# Patient Record
Sex: FEMALE | Race: WHITE | NOT HISPANIC OR LATINO | Employment: OTHER | URBAN - METROPOLITAN AREA
[De-identification: names, ages, dates, MRNs, and addresses within clinical notes are randomized per-mention and may not be internally consistent; named-entity substitution may affect disease eponyms.]

---

## 2017-01-10 RX ORDER — ATORVASTATIN CALCIUM 40 MG/1
TABLET, FILM COATED ORAL
Qty: 90 TAB | Refills: 1 | Status: SHIPPED | OUTPATIENT
Start: 2017-01-10 | End: 2017-05-10 | Stop reason: SDUPTHER

## 2017-05-05 ENCOUNTER — PATIENT MESSAGE (OUTPATIENT)
Dept: MEDICAL GROUP | Facility: MEDICAL CENTER | Age: 62
End: 2017-05-05

## 2017-05-05 DIAGNOSIS — Z00.00 ANNUAL PHYSICAL EXAM: ICD-10-CM

## 2017-05-08 NOTE — TELEPHONE ENCOUNTER
From: Ren Shrestha  To: Asa Gordon M.D.  Sent: 2017 5:10 PM PDT  Subject: Non-Urgent Medical Question    RE: bloodwork    If I get the papers and bloodwork done monday will you have results by Wednesday morning? Ren pickett 51  ----- Message -----  From: Asa Gordon M.D.  Sent: 2017 5:04 PM PDT  To: Ren Shrestha  Subject: RE: Non-Urgent Medical Question    Ren,    I have ordered fasting blood work, but you will need to  the orders up at our . Unfortunately, we are not open on the weekends, we do opened around 7:30 in the morning on Monday.    Dr. Gordon      ----- Message -----   From: REN SHRESTHA   Sent: 2017 4:55 PM PDT   To: Asa Gordon M.D.  Subject: Non-Urgent Medical Question    Hi Dr. Gordon;  I have an appt. with you May 10, Wednesday morning.  I was hoping to get blood work done before my visit. I need to have my Cholesterol  levels checked and the Neurologist you sent me to suggested i have my serum creatine checked  again as well. Let me know if theres enough time, i could get it done at [facility] saturday, tomorrow  morning. Ren pickett 51

## 2017-05-10 ENCOUNTER — OFFICE VISIT (OUTPATIENT)
Dept: MEDICAL GROUP | Facility: MEDICAL CENTER | Age: 62
End: 2017-05-10
Payer: COMMERCIAL

## 2017-05-10 VITALS
DIASTOLIC BLOOD PRESSURE: 82 MMHG | HEART RATE: 76 BPM | WEIGHT: 174 LBS | SYSTOLIC BLOOD PRESSURE: 124 MMHG | OXYGEN SATURATION: 94 % | TEMPERATURE: 98.4 F | RESPIRATION RATE: 16 BRPM | HEIGHT: 65 IN | BODY MASS INDEX: 28.99 KG/M2

## 2017-05-10 DIAGNOSIS — E55.9 VITAMIN D INSUFFICIENCY: ICD-10-CM

## 2017-05-10 DIAGNOSIS — M25.511 CHRONIC RIGHT SHOULDER PAIN: ICD-10-CM

## 2017-05-10 DIAGNOSIS — G89.29 CHRONIC RIGHT SHOULDER PAIN: ICD-10-CM

## 2017-05-10 DIAGNOSIS — R74.8 ELEVATED CPK: ICD-10-CM

## 2017-05-10 DIAGNOSIS — M67.471 DIGITAL MUCOUS CYST OF TOE OF RIGHT FOOT: ICD-10-CM

## 2017-05-10 DIAGNOSIS — E78.00 HYPERCHOLESTEREMIA: ICD-10-CM

## 2017-05-10 PROBLEM — M67.40 BENIGN CYSTIC MUCINOUS TUMOR: Status: ACTIVE | Noted: 2017-05-10

## 2017-05-10 PROCEDURE — 99214 OFFICE O/P EST MOD 30 MIN: CPT | Performed by: FAMILY MEDICINE

## 2017-05-10 RX ORDER — ATORVASTATIN CALCIUM 80 MG/1
80 TABLET, FILM COATED ORAL
Qty: 90 TAB | Refills: 3 | Status: SHIPPED | OUTPATIENT
Start: 2017-05-10 | End: 2018-03-13 | Stop reason: SDUPTHER

## 2017-05-10 ASSESSMENT — PATIENT HEALTH QUESTIONNAIRE - PHQ9: CLINICAL INTERPRETATION OF PHQ2 SCORE: 0

## 2017-05-10 NOTE — PROGRESS NOTES
Subjective:   Joanie Arenas is a 62 y.o. female here today for elevated CPK, hyperlipidemia, right shoulder pain, right foot pain    Vitamin D insufficiency  Taking vitamin D 2000 units daily.    Digital mucous cyst of toe of right foot  Patient has a medial right first metatarsal joint cyst that all of a sudden has increased in size. She states it is sometimes difficult for her to wear shoes, especially if the toe box is not very wide.    Chronic right shoulder pain  For the last 6 months patient has had right shoulder pain. She states that shoulder pain has slowly improved. She went to physical therapy after initial pain came on, she states physical therapy was not very helpful for improving her pain. She does exercises at the gym for her shoulders.  Patient has difficulty unplugging her blow dryer and brushing her hair.    Elevated CPK  Patient suffers from regular muscle cramps. She has reduced her alcohol intake from a couple beers per week 20 beers per week. She has lost 20 pounds as a result.  Her CPK count is improved.    Hypercholesteremia  Patient has significant family history of early coronary artery disease. Her father  in his 30s. Patient is on atorvastatin 40 mg daily.         Current medicines (including changes today)  Current Outpatient Prescriptions   Medication Sig Dispense Refill   • atorvastatin (LIPITOR) 80 MG tablet Take 1 Tab by mouth every day. 90 Tab 3   • meloxicam (MOBIC) 15 MG tablet 1 tab PO daily x 14 days then 1 tab PO daily PRN 30 Tab 0   • lisinopril (PRINIVIL) 20 MG Tab TAKE ONE TABLET BY MOUTH ONE TIME DAILY 90 Tab 3   • nitrofurantoin (MACRODANTIN) 50 MG Cap Take 50 mg by mouth 4 times a day.     • fluticasone (FLONASE) 50 MCG/ACT nasal spray Spray 1 Spray in nose 2 times a day. 1 Bottle 0   • vitamin B comp+C (ALLBEE WITH C) Tab Take 1 Tab by mouth every day.     • ascorbic acid (ASCORBIC ACID) 500 MG Tab Take 500 mg by mouth every day.     • Cranberry 125 MG Tab  "Take  by mouth.     • aspirin (ASA) 81 MG Chew Tab chewable tablet Take 81 mg by mouth every day.     • vitamin D (CHOLECALCIFEROL) 1000 UNIT Tab Take 1,000 Units by mouth every day.     • Omega-3 Fatty Acids (SALMON OIL-1000 PO) Take  by mouth.     • Probiotic Product (PROBIOTIC DAILY PO) Take  by mouth.     • Glucosamine-Chondroit-Vit C-Mn (GLUCOSAMINE 1500 COMPLEX PO) Take  by mouth.     • CHONDROITIN SULFATE PO Take  by mouth.     • ibuprofen (MOTRIN) 200 MG Tab Take 200 mg by mouth every 6 hours as needed.     • Magnesium 300 MG Cap Take  by mouth.     • estradiol (ESTRACE) 0.1 MG/GM vaginal cream Use vaginally daily for one week, then twice a week 1 Tube 5     No current facility-administered medications for this visit.     She  has a past medical history of Hypercholesteremia; Arthritis (2014); Benign hypertension (2014); Cold (2014); and Pain (2014).    ROS   No chest pain, no shortness of breath       Objective:     Blood pressure 124/82, pulse 76, temperature 36.9 °C (98.4 °F), resp. rate 16, height 1.651 m (5' 5\"), weight 78.926 kg (174 lb), SpO2 94 %. Body mass index is 28.96 kg/(m^2).   Physical Exam:  Constitutional: Alert, no distress.  Skin: Warm, dry, good turgor, no rashes in visible areas.  Eye: Equal, round and reactive, conjunctiva clear, lids normal.  Psych: Alert and oriented x3, normal affect and mood.  Right foot: Soft, mobile, nodule of right medial first metatarsal joint.    Results for REN SHRESTHA (MRN [id number]) as of 5/10/2017 09:02   Ref. Range 5/8/2017 08:27   WBC Latest Ref Range: 3.4-10.8 x10E3/uL 5.2   RBC Latest Ref Range: 3.77-5.28 x10E6/uL 4.83   Hemoglobin Latest Ref Range: 11.1-15.9 g/dL 14.1   Hematocrit Latest Ref Range: 34.0-46.6 % 44.0   MCV Latest Ref Range: 79-97 fL 91   MCH Latest Ref Range: 26.6-33.0 pg 29.2   MCHC Latest Ref Range: 31.5-35.7 g/dL 32.0   RDW Latest Ref Range: 12.3-15.4 % 14.1   Platelet Count Latest Ref Range: 150-379 x10E3/uL 256   Immature " Cells Unknown CANCELED   Neutrophils-Polys Latest Units: % 57   Neutrophils (Absolute) Latest Ref Range: 1.4-7.0 x10E3/uL 3.0   Lymphocytes Latest Units: % 34   Lymphs (Absolute) Latest Ref Range: 0.7-3.1 x10E3/uL 1.8   Monocytes Latest Units: % 7   Monos (Absolute) Latest Ref Range: 0.1-0.9 x10E3/uL 0.4   Eosinophils Latest Units: % 1   Eos (Absolute) Latest Ref Range: 0.0-0.4 x10E3/uL 0.1   Basophils Latest Units: % 1   Baso (Absolute) Latest Ref Range: 0.0-0.2 x10E3/uL 0.0   Immature Granulocytes Latest Units: % 0   Immature Granulocytes (abs) Latest Ref Range: 0.0-0.1 x10E3/uL 0.0   Nucleated RBC Unknown CANCELED   Comments-Diff Unknown CANCELED   Sodium Latest Ref Range: 134-144 mmol/L 144   Potassium Latest Ref Range: 3.5-5.2 mmol/L 4.0   Chloride Latest Ref Range:  mmol/L 106   Co2 Latest Ref Range: 18-29 mmol/L 22   Glucose Latest Ref Range: 65-99 mg/dL 96   Bun Latest Ref Range: 8-27 mg/dL 14   Creatinine Latest Ref Range: 0.57-1.00 mg/dL 0.66   GFR If  Latest Ref Range: >59 mL/min/1.73 108   GFR If Non  Latest Ref Range: >59 mL/min/1.73 94   Bun-Creatinine Ratio Latest Ref Range: 12-28  21   Calcium Latest Ref Range: 8.7-10.3 mg/dL 8.9   AST(SGOT) Latest Ref Range: 0-40 IU/L 23   ALT(SGPT) Latest Ref Range: 0-32 IU/L 20   Alkaline Phosphatase Latest Ref Range:  IU/L 72   Total Bilirubin Latest Ref Range: 0.0-1.2 mg/dL 0.5   Albumin Latest Ref Range: 3.6-4.8 g/dL 4.4   Total Protein Latest Ref Range: 6.0-8.5 g/dL 6.4   Globulin Latest Ref Range: 1.5-4.5 g/dL 2.0   A-G Ratio Latest Ref Range: 1.2-2.2  2.2   CPK Total Latest Ref Range:  U/L 274 (H)   Cholesterol,Tot Latest Ref Range: 100-199 mg/dL 231 (H)   Triglycerides Latest Ref Range: 0-149 mg/dL 135   HDL Latest Ref Range: >39 mg/dL 41   LDL Latest Ref Range: 0-99 mg/dL 163 (H)   VLDL Cholesterol Calc Latest Ref Range: 5-40 mg/dL 27   Comment: Unknown CANCELED   CK Macro Type 2 Latest Ref Range: Not  Observed % 0   CPK3 Mm Latest Ref Range:  % 92 (L)   CPK2 Mb Latest Ref Range: 0-3 % 3   CK Macro Type 1 Latest Ref Range: Not Observed % 5 (H)   CPK1 Bb Latest Ref Range: 0 % 0   25-Hydroxy   Vitamin D 25 Latest Ref Range: 30.0-100.0 ng/mL 32.3   TSH Latest Ref Range: 0.450-4.500 uIU/mL 2.120   Free T-4 Latest Ref Range: 0.82-1.77 ng/dL 1.10         Assessment and Plan:   The following treatment plan was discussed    1. Hypercholesteremia  Uncontrolled. Increase atorvastatin from 40 mg daily to 20 mg daily. Follow up with labs in 6 months. Monitor cramping.  - COMP METABOLIC PANEL; Future  - LIPID PROFILE; Future  - atorvastatin (LIPITOR) 80 MG tablet; Take 1 Tab by mouth every day.  Dispense: 90 Tab; Refill: 3    2. Elevated CPK  Most likely from muscle cramping. Advised hydration, which she is doing. Advise regular stretching, which she is doing. Consider adding magnesium 400 mg daily.  - CK ISOENZYMES SERUM; Future    3. Vitamin D insufficiency  Controlled. Continue vitamin D supplementation.    4. Chronic right shoulder pain  Reassurance given. Advised patient on allowing more time and continuing with exercises.    5. Digital mucous cyst of toe of right foot  Advised patient to notify us if cyst becomes symptomatic and we will refer her to foot specialist.      Followup: Return in about 6 months (around 11/10/2017) for Labs, Short.

## 2017-05-10 NOTE — ASSESSMENT & PLAN NOTE
For the last 6 months patient has had right shoulder pain. She states that shoulder pain has slowly improved. She went to physical therapy after initial pain came on, she states physical therapy was not very helpful for improving her pain. She does exercises at the gym for her shoulders.  Patient has difficulty unplugging her blow dryer and brushing her hair.

## 2017-05-10 NOTE — MR AVS SNAPSHOT
"        Joanie Germain Dagoberto   5/10/2017 8:40 AM   Office Visit   MRN: [id number]    Department:  [facility]   Dept Phone:  [phone]    Description:  Female : 1955   Provider:  Asa Gordon M.D.           Reason for Visit     Follow-Up     Results           Allergies as of 5/10/2017     No Known Allergies      You were diagnosed with     Hypercholesteremia   [299067]       Elevated CPK   [284369]       Vitamin D insufficiency   [560892]       Chronic right shoulder pain   [793207]       Digital mucous cyst of toe of right foot   [4092660]         Vital Signs     Blood Pressure Pulse Temperature Respirations Height Weight    124/82 mmHg 76 36.9 °C (98.4 °F) 16 1.651 m (5' 5\") 78.926 kg (174 lb)    Body Mass Index Oxygen Saturation Smoking Status             28.96 kg/m2 94% Never Smoker          Basic Information     Date Of Birth Sex Race Ethnicity Preferred Language    1955 Female White Non- English      Problem List              ICD-10-CM Priority Class Noted - Resolved    Essential hypertension, benign I10   2012 - Present    Hypercholesteremia E78.00   2012 - Present    Other chest pain R07.89   2012 - Present    Undiagnosed cardiac murmurs R01.1   2012 - Present    Abnormal stress ECG with treadmill R94.39   2012 - Present    Family history of early CAD Z82.49   2013 - Present    Chronic lower back pain M54.5, G89.29   2013 - Present    Vitamin D insufficiency E55.9   2013 - Present    Spinal stenosis, lumbar region, without neurogenic claudication M48.06   2014 - Present    Left foot pain M79.672   2014 - Present    Muscle cramps R25.2   2015 - Present    Primary osteoarthritis of both knees M17.0   2015 - Present    Pain with urination R30.9   2015 - Present    Bilateral hearing loss H91.93   2015 - Present    Upper respiratory infection J06.9   2015 - Present    Elevated CPK R74.8   2016 - " Present    Chronic right shoulder pain M25.511, G89.29   5/10/2017 - Present    Digital mucous cyst of toe of right foot M67.471   5/10/2017 - Present      Health Maintenance        Date Due Completion Dates    PAP SMEAR 1/28/1974 ---    MAMMOGRAM 11/7/2017 11/7/2016, 12/30/2014, 11/6/2013, 10/19/2012, 6/9/2011, 12/28/2009, 12/28/2009, 4/30/2008, 4/30/2008    COLONOSCOPY 12/9/2023 12/9/2013    IMM DTaP/Tdap/Td Vaccine (2 - Td) 7/1/2025 7/1/2015            Current Immunizations     Influenza TIV (IM) 9/23/2014, 10/1/2013    Influenza Vaccine Quad Inj (Pf) 10/25/2015    Pneumococcal polysaccharide vaccine (PPSV-23) 11/26/2002    SHINGLES VACCINE 9/24/2013    Tdap Vaccine 7/1/2015    Tetanus Vaccine 5/11/2001      Below and/or attached are the medications your provider expects you to take. Review all of your home medications and newly ordered medications with your provider and/or pharmacist. Follow medication instructions as directed by your provider and/or pharmacist. Please keep your medication list with you and share with your provider. Update the information when medications are discontinued, doses are changed, or new medications (including over-the-counter products) are added; and carry medication information at all times in the event of emergency situations     Allergies:  No Known Allergies          Medications  Valid as of: May 10, 2017 -  9:28 AM    Generic Name Brand Name Tablet Size Instructions for use    Ascorbic Acid (Tab) ascorbic acid 500 MG Take 500 mg by mouth every day.        Aspirin (Chew Tab) ASA 81 MG Take 81 mg by mouth every day.        Atorvastatin Calcium (Tab) LIPITOR 80 MG Take 1 Tab by mouth every day.        B Complex-C (Tab) ALLBEE WITH C  Take 1 Tab by mouth every day.        Cholecalciferol (Tab) cholecalciferol 1000 UNIT Take 1,000 Units by mouth every day.        Chondroitin Sulfate   Take  by mouth.        Cranberry (Tab) Cranberry 125 MG Take  by mouth.        Estradiol (Cream)  ESTRACE 0.1 MG/GM Use vaginally daily for one week, then twice a week        Fluticasone Propionate (Suspension) FLONASE 50 MCG/ACT Spray 1 Spray in nose 2 times a day.        Glucosamine-Chondroit-Vit C-Mn   Take  by mouth.        Ibuprofen (Tab) MOTRIN 200 MG Take 200 mg by mouth every 6 hours as needed.        Lisinopril (Tab) PRINIVIL 20 MG TAKE ONE TABLET BY MOUTH ONE TIME DAILY        Magnesium (Cap) Magnesium 300 MG Take  by mouth.        Meloxicam (Tab) MOBIC 15 MG 1 tab PO daily x 14 days then 1 tab PO daily PRN        Nitrofurantoin Macrocrystal (Cap) MACRODANTIN 50 MG Take 50 mg by mouth 4 times a day.        Omega-3 Fatty Acids   Take  by mouth.        Probiotic Product   Take  by mouth.        .                 Medicines prescribed today were sent to:     [address] [address]    Phone: [phone] Fax: [phone]    Open 24 Hours?: No      Medication refill instructions:       If your prescription bottle indicates you have medication refills left, it is not necessary to call your provider’s office. Please contact your pharmacy and they will refill your medication.    If your prescription bottle indicates you do not have any refills left, you may request refills at any time through one of the following ways: The online [facility] system (except Urgent Care), by calling your provider’s office, or by asking your pharmacy to contact your provider’s office with a refill request. Medication refills are processed only during regular business hours and may not be available until the next business day. Your provider may request additional information or to have a follow-up visit with you prior to refilling your medication.   *Please Note: Medication refills are assigned a new Rx number when refilled electronically. Your pharmacy may indicate that no refills were authorized even though a new prescription for the same medication is  available at the pharmacy. Please request the medicine by name with the pharmacy before contacting your provider for a refill.        Your To Do List     Future Labs/Procedures Complete By Expires    CK ISOENZYMES SERUM  As directed 5/11/2018    COMP METABOLIC PANEL  As directed 5/11/2018    LIPID PROFILE  As directed 5/11/2018         MyChart Access Code: Activation code not generated  Current [facility]renschmiede Status: Active

## 2017-05-10 NOTE — ASSESSMENT & PLAN NOTE
Patient has a medial right first metatarsal joint cyst that all of a sudden has increased in size. She states it is sometimes difficult for her to wear shoes, especially if the toe box is not very wide.

## 2017-05-10 NOTE — ASSESSMENT & PLAN NOTE
Patient suffers from regular muscle cramps. She has reduced her alcohol intake from a couple beers per week 20 beers per week. She has lost 20 pounds as a result.  Her CPK count is improved.

## 2017-05-10 NOTE — ASSESSMENT & PLAN NOTE
Patient has significant family history of early coronary artery disease. Her father  in his 30s. Patient is on atorvastatin 40 mg daily.

## 2017-05-31 ENCOUNTER — PATIENT MESSAGE (OUTPATIENT)
Dept: MEDICAL GROUP | Facility: MEDICAL CENTER | Age: 62
End: 2017-05-31

## 2017-05-31 DIAGNOSIS — E78.00 HYPERCHOLESTEREMIA: ICD-10-CM

## 2017-05-31 DIAGNOSIS — Z82.49 FAMILY HISTORY OF EARLY CAD: ICD-10-CM

## 2017-05-31 NOTE — TELEPHONE ENCOUNTER
"From: Joanie Arenas  To: Asa Gordon M.D.  Sent: 5/31/2017 4:00 PM PDT  Subject: Non-Urgent Medical Question    Hi Dr. Gordon; this is Dagoberto Nair birthdate [birth year]. I was just in to see you a few weeks ago.  My daughter who is 39 has hi- cholesterol (it is family history related) she went to see her cardiologist, Dr. Quinones,  He told her that when she is 48 she should have a \"Coronary calcification Study\" to measure the risk of heart disease.  He also told her that I, her mom should have one done asap. would you recommend one for me??    Joanie Arenas  "

## 2017-06-08 ENCOUNTER — PATIENT MESSAGE (OUTPATIENT)
Dept: MEDICAL GROUP | Facility: MEDICAL CENTER | Age: 62
End: 2017-06-08

## 2017-06-08 ENCOUNTER — HOSPITAL ENCOUNTER (OUTPATIENT)
Dept: RADIOLOGY | Facility: MEDICAL CENTER | Age: 62
End: 2017-06-08
Attending: FAMILY MEDICINE
Payer: COMMERCIAL

## 2017-06-08 DIAGNOSIS — E78.00 HYPERCHOLESTEREMIA: ICD-10-CM

## 2017-06-08 DIAGNOSIS — Z82.49 FAMILY HISTORY OF EARLY CAD: ICD-10-CM

## 2017-06-08 PROCEDURE — 4410556 CT-CARDIAC SCORING

## 2017-06-09 NOTE — TELEPHONE ENCOUNTER
"From: Joanie Arenas  To: Asa Gordon M.D.  Sent: 2017 10:40 PM PDT  Subject: Non-Urgent Medical Question    This is from Joanie Arenas  51 I had the the Coronary Calcification Test and got your comments. You suggested  taking \"Crestor\". I'm already taking Atorvastation 80 mg. Aren't they the same??    Joanie Arenas  "

## 2017-06-28 RX ORDER — LISINOPRIL 20 MG/1
TABLET ORAL
Qty: 90 TAB | Refills: 3 | Status: SHIPPED | OUTPATIENT
Start: 2017-06-28 | End: 2018-06-08 | Stop reason: SDUPTHER

## 2017-11-14 ENCOUNTER — TELEPHONE (OUTPATIENT)
Dept: MEDICAL GROUP | Facility: MEDICAL CENTER | Age: 62
End: 2017-11-14

## 2017-11-14 NOTE — TELEPHONE ENCOUNTER
----- Message from Asa Gordon M.D. sent at 11/14/2017  7:17 AM PST -----  Please notify patient that her CPK, blood test checking her muscle breakdown, continues to improve. We should recheck it in 6 months before her annual appointment.  Asa Gordon M.D.

## 2017-12-15 ENCOUNTER — HOSPITAL ENCOUNTER (OUTPATIENT)
Dept: RADIOLOGY | Facility: MEDICAL CENTER | Age: 62
End: 2017-12-15
Attending: FAMILY MEDICINE
Payer: COMMERCIAL

## 2017-12-15 DIAGNOSIS — Z12.31 VISIT FOR SCREENING MAMMOGRAM: ICD-10-CM

## 2017-12-15 PROCEDURE — G0202 SCR MAMMO BI INCL CAD: HCPCS

## 2018-03-13 DIAGNOSIS — E78.00 HYPERCHOLESTEREMIA: ICD-10-CM

## 2018-03-13 RX ORDER — ATORVASTATIN CALCIUM 80 MG/1
80 TABLET, FILM COATED ORAL
Qty: 90 TAB | Refills: 3 | Status: SHIPPED | OUTPATIENT
Start: 2018-03-13 | End: 2018-12-11 | Stop reason: SDUPTHER

## 2018-06-08 ENCOUNTER — PATIENT MESSAGE (OUTPATIENT)
Dept: MEDICAL GROUP | Facility: MEDICAL CENTER | Age: 63
End: 2018-06-08

## 2018-06-08 DIAGNOSIS — E78.00 HYPERCHOLESTEREMIA: ICD-10-CM

## 2018-06-08 DIAGNOSIS — R74.8 ELEVATED CPK: ICD-10-CM

## 2018-06-08 DIAGNOSIS — I10 ESSENTIAL HYPERTENSION, BENIGN: ICD-10-CM

## 2018-06-08 RX ORDER — LISINOPRIL 20 MG/1
TABLET ORAL
Qty: 90 TAB | Refills: 0 | Status: SHIPPED | OUTPATIENT
Start: 2018-06-08 | End: 2018-07-06 | Stop reason: SDUPTHER

## 2018-06-08 NOTE — TELEPHONE ENCOUNTER
Refill done. Patient is due for annual appointment. Please have patient schedule.  Asa Gordon M.D.

## 2018-06-08 NOTE — LETTER
June 8, 2018        Joanie Arenas  [address] [facility] [place] [address]        Dear Joanie:    This letter is to inform you the you are due for an annual appointment. Please contact our scheduling department at [phone] To schedule       If you have any questions or concerns, please don't hesitate to call.        Sincerely,        Asa Gordon M.D.    Electronically Signed

## 2018-06-20 ENCOUNTER — HOSPITAL ENCOUNTER (OUTPATIENT)
Dept: LAB | Facility: MEDICAL CENTER | Age: 63
End: 2018-06-20
Attending: FAMILY MEDICINE
Payer: MEDICARE

## 2018-06-20 DIAGNOSIS — E78.00 HYPERCHOLESTEREMIA: ICD-10-CM

## 2018-06-20 DIAGNOSIS — R74.8 ELEVATED CPK: ICD-10-CM

## 2018-06-20 DIAGNOSIS — I10 ESSENTIAL HYPERTENSION, BENIGN: ICD-10-CM

## 2018-06-20 LAB
ALBUMIN SERPL BCP-MCNC: 4.7 G/DL (ref 3.2–4.9)
ALBUMIN/GLOB SERPL: 2 G/DL
ALP SERPL-CCNC: 71 U/L (ref 30–99)
ALT SERPL-CCNC: 28 U/L (ref 2–50)
ANION GAP SERPL CALC-SCNC: 8 MMOL/L (ref 0–11.9)
AST SERPL-CCNC: 26 U/L (ref 12–45)
BASOPHILS # BLD AUTO: 1.1 % (ref 0–1.8)
BASOPHILS # BLD: 0.06 K/UL (ref 0–0.12)
BILIRUB SERPL-MCNC: 0.9 MG/DL (ref 0.1–1.5)
BUN SERPL-MCNC: 16 MG/DL (ref 8–22)
CALCIUM SERPL-MCNC: 9.2 MG/DL (ref 8.5–10.5)
CHLORIDE SERPL-SCNC: 106 MMOL/L (ref 96–112)
CHOLEST SERPL-MCNC: 274 MG/DL (ref 100–199)
CO2 SERPL-SCNC: 26 MMOL/L (ref 20–33)
CREAT SERPL-MCNC: 0.72 MG/DL (ref 0.5–1.4)
EOSINOPHIL # BLD AUTO: 0.04 K/UL (ref 0–0.51)
EOSINOPHIL NFR BLD: 0.7 % (ref 0–6.9)
ERYTHROCYTE [DISTWIDTH] IN BLOOD BY AUTOMATED COUNT: 45.8 FL (ref 35.9–50)
GLOBULIN SER CALC-MCNC: 2.3 G/DL (ref 1.9–3.5)
GLUCOSE SERPL-MCNC: 85 MG/DL (ref 65–99)
HCT VFR BLD AUTO: 46.5 % (ref 37–47)
HDLC SERPL-MCNC: 52 MG/DL
HGB BLD-MCNC: 14.9 G/DL (ref 12–16)
IMM GRANULOCYTES # BLD AUTO: 0.02 K/UL (ref 0–0.11)
IMM GRANULOCYTES NFR BLD AUTO: 0.4 % (ref 0–0.9)
LDLC SERPL CALC-MCNC: 195 MG/DL
LYMPHOCYTES # BLD AUTO: 2.02 K/UL (ref 1–4.8)
LYMPHOCYTES NFR BLD: 36.4 % (ref 22–41)
MCH RBC QN AUTO: 29.1 PG (ref 27–33)
MCHC RBC AUTO-ENTMCNC: 32 G/DL (ref 33.6–35)
MCV RBC AUTO: 90.8 FL (ref 81.4–97.8)
MONOCYTES # BLD AUTO: 0.45 K/UL (ref 0–0.85)
MONOCYTES NFR BLD AUTO: 8.1 % (ref 0–13.4)
NEUTROPHILS # BLD AUTO: 2.96 K/UL (ref 2–7.15)
NEUTROPHILS NFR BLD: 53.3 % (ref 44–72)
NRBC # BLD AUTO: 0 K/UL
NRBC BLD-RTO: 0 /100 WBC
PLATELET # BLD AUTO: 270 K/UL (ref 164–446)
PMV BLD AUTO: 10.1 FL (ref 9–12.9)
POTASSIUM SERPL-SCNC: 4 MMOL/L (ref 3.6–5.5)
PROT SERPL-MCNC: 7 G/DL (ref 6–8.2)
RBC # BLD AUTO: 5.12 M/UL (ref 4.2–5.4)
SODIUM SERPL-SCNC: 140 MMOL/L (ref 135–145)
TRIGL SERPL-MCNC: 133 MG/DL (ref 0–149)
TSH SERPL DL<=0.005 MIU/L-ACNC: 1.93 UIU/ML (ref 0.38–5.33)
WBC # BLD AUTO: 5.6 K/UL (ref 4.8–10.8)

## 2018-06-20 PROCEDURE — 80061 LIPID PANEL: CPT

## 2018-06-20 PROCEDURE — 80053 COMPREHEN METABOLIC PANEL: CPT

## 2018-06-20 PROCEDURE — 84443 ASSAY THYROID STIM HORMONE: CPT

## 2018-06-20 PROCEDURE — 85025 COMPLETE CBC W/AUTO DIFF WBC: CPT

## 2018-06-20 PROCEDURE — 82552 ASSAY OF CPK IN BLOOD: CPT

## 2018-06-20 PROCEDURE — 36415 COLL VENOUS BLD VENIPUNCTURE: CPT

## 2018-06-20 PROCEDURE — 82550 ASSAY OF CK (CPK): CPT

## 2018-06-23 LAB
CK BB CFR SERPL ELPH: 0 % (ref 0–0)
CK MACRO1 CFR SERPL: 0 % (ref 0–0)
CK MACRO2 CFR SERPL: 0 % (ref 0–0)
CK MB CFR SERPL ELPH: 2 % (ref 0–4)
CK MM CFR SERPL ELPH: 98 % (ref 96–100)
CK SERPL-CCNC: 338 U/L (ref 20–180)

## 2018-07-06 ENCOUNTER — OFFICE VISIT (OUTPATIENT)
Dept: MEDICAL GROUP | Facility: MEDICAL CENTER | Age: 63
End: 2018-07-06
Payer: MEDICARE

## 2018-07-06 VITALS
DIASTOLIC BLOOD PRESSURE: 96 MMHG | SYSTOLIC BLOOD PRESSURE: 152 MMHG | HEART RATE: 74 BPM | OXYGEN SATURATION: 96 % | BODY MASS INDEX: 26.19 KG/M2 | WEIGHT: 157.2 LBS | TEMPERATURE: 98.2 F | HEIGHT: 65 IN

## 2018-07-06 DIAGNOSIS — Z78.0 ASYMPTOMATIC MENOPAUSAL STATE: ICD-10-CM

## 2018-07-06 DIAGNOSIS — Z23 NEED FOR VACCINATION: ICD-10-CM

## 2018-07-06 DIAGNOSIS — R74.8 ELEVATED CPK: ICD-10-CM

## 2018-07-06 DIAGNOSIS — I10 ESSENTIAL HYPERTENSION, BENIGN: ICD-10-CM

## 2018-07-06 DIAGNOSIS — E78.00 HYPERCHOLESTEREMIA: ICD-10-CM

## 2018-07-06 PROCEDURE — 90670 PCV13 VACCINE IM: CPT | Performed by: FAMILY MEDICINE

## 2018-07-06 PROCEDURE — G0009 ADMIN PNEUMOCOCCAL VACCINE: HCPCS | Performed by: FAMILY MEDICINE

## 2018-07-06 PROCEDURE — 99214 OFFICE O/P EST MOD 30 MIN: CPT | Mod: 25 | Performed by: FAMILY MEDICINE

## 2018-07-06 RX ORDER — LISINOPRIL 40 MG/1
40 TABLET ORAL DAILY
Qty: 90 TAB | Refills: 3 | Status: SHIPPED | OUTPATIENT
Start: 2018-07-06 | End: 2018-09-25

## 2018-07-06 ASSESSMENT — PATIENT HEALTH QUESTIONNAIRE - PHQ9: CLINICAL INTERPRETATION OF PHQ2 SCORE: 0

## 2018-07-06 NOTE — ASSESSMENT & PLAN NOTE
Patient has been checking blood pressure because of headache, home readings are 169/113. She takes lisinopril 20 mg nightly.

## 2018-07-06 NOTE — ASSESSMENT & PLAN NOTE
Occasionally gets muscle cramps. She is on atorvastatin 80 mg daily, tried to discontinue but did not see a great improvement.  Drinks only 1 drink per week. No muscle weakness.  Results for HENRIETTA REN SIMMONS (MRN [id number]) as of 7/6/2018 07:50   Ref. Range 5/12/2016 09:50 5/8/2017 08:27 6/20/2018 10:19   CPK Total Latest Ref Range: 20 - 180 U/L 462 (H) 274 (H) 338 (H)

## 2018-07-06 NOTE — PROGRESS NOTES
Subjective:   Ren Arenas is a 63 y.o. female here today for HTN    Essential hypertension, benign  Patient has been checking blood pressure because of headache, home readings are 169/113. She takes lisinopril 20 mg nightly.    Hypercholesteremia  Patient is taking Atorvastatin 80 mg daily. Has not been eating well.  Coronary calcium score in 2017 was 17 in LAD.  Results for REN ARENAS (MRN [id number]) as of 7/6/2018 07:50   Ref. Range 6/20/2018 10:19   Cholesterol,Tot Latest Ref Range: 100 - 199 mg/dL 274 (H)   Triglycerides Latest Ref Range: 0 - 149 mg/dL 133   HDL Latest Ref Range: >=40 mg/dL 52   LDL Latest Ref Range: <100 mg/dL 195 (H)       Elevated CPK  Occasionally gets muscle cramps. She is on atorvastatin 80 mg daily, tried to discontinue but did not see a great improvement.  Drinks only 1 drink per week. No muscle weakness.  Results for REN ARENAS (MRN [id number]) as of 7/6/2018 07:50   Ref. Range 5/12/2016 09:50 5/8/2017 08:27 6/20/2018 10:19   CPK Total Latest Ref Range: 20 - 180 U/L 462 (H) 274 (H) 338 (H)          Current medicines (including changes today)  Current Outpatient Prescriptions   Medication Sig Dispense Refill   • lisinopril (PRINIVIL, ZESTRIL) 40 MG tablet Take 1 Tab by mouth every day. 90 Tab 3   • atorvastatin (LIPITOR) 80 MG tablet Take 1 Tab by mouth every day. 90 Tab 3   • nitrofurantoin (MACRODANTIN) 50 MG Cap Take 50 mg by mouth 4 times a day.     • fluticasone (FLONASE) 50 MCG/ACT nasal spray Spray 1 Spray in nose 2 times a day. 1 Bottle 0   • vitamin B comp+C (ALLBEE WITH C) Tab Take 1 Tab by mouth every day.     • ascorbic acid (ASCORBIC ACID) 500 MG Tab Take 500 mg by mouth every day.     • Cranberry 125 MG Tab Take  by mouth.     • aspirin (ASA) 81 MG Chew Tab chewable tablet Take 81 mg by mouth every day.     • vitamin D (CHOLECALCIFEROL) 1000 UNIT Tab Take 1,000 Units by mouth every day.     • Omega-3 Fatty Acids (SALMON OIL-1000 PO) Take  by  "mouth.     • Probiotic Product (PROBIOTIC DAILY PO) Take  by mouth.     • Glucosamine-Chondroit-Vit C-Mn (GLUCOSAMINE 1500 COMPLEX PO) Take  by mouth.     • CHONDROITIN SULFATE PO Take  by mouth.     • ibuprofen (MOTRIN) 200 MG Tab Take 200 mg by mouth every 6 hours as needed.     • Magnesium 300 MG Cap Take  by mouth.     • estradiol (ESTRACE) 0.1 MG/GM vaginal cream Use vaginally daily for one week, then twice a week 1 Tube 5     No current facility-administered medications for this visit.      She  has a past medical history of Arthritis (2014); Benign hypertension (2014); Cold (2014); Hypercholesteremia; and Pain (2014).    ROS   No chest pain, no shortness of breath       Objective:     Blood pressure 152/96, pulse 74, temperature 36.8 °C (98.2 °F), height 1.651 m (5' 5\"), weight 71.3 kg (157 lb 3.2 oz), SpO2 96 %. Body mass index is 26.16 kg/m².   Physical Exam:  Constitutional: Alert, no distress.  Skin: Warm, dry, good turgor, no rashes in visible areas.  Eye: Equal, round and reactive, conjunctiva clear, lids normal.  Respiratory: Unlabored respiratory effort, lungs clear to auscultation, no wheezes, no ronchi.  Cardiovascular: Normal S1, S2, no murmur, no edema.  Psych: Alert and oriented x3, normal affect and mood.        Assessment and Plan:   The following treatment plan was discussed    1. Essential hypertension, benign  Uncontrolled. Increase Lisinopril 40 mg daily from 20 mg daily.  Follow-up in 3 weeks to review blood pressure.  Patient will check blood pressure twice daily at home.  - lisinopril (PRINIVIL, ZESTRIL) 40 MG tablet; Take 1 Tab by mouth every day.  Dispense: 90 Tab; Refill: 3    2. Hypercholesteremia  Patient is on atorvastatin 80 mg daily but has significantly elevated LDL.  She has follow-up with cardiology.    3. Elevated CPK  Asymptomatic.  Reassurance offered.  Offered muscle biopsy, but patient declines after discussing risks versus benefit.    4. Need for vaccination  - " PNEUMOCOCCAL CONJUGATE VACCINE 13-VALENT    5. Asymptomatic menopausal state  DEXA ordered, call with results.  - DS-BONE DENSITY STUDY (DEXA); Future      Followup: Return in about 3 weeks (around 7/27/2018) for HTN.

## 2018-07-06 NOTE — ASSESSMENT & PLAN NOTE
Patient is taking Atorvastatin 80 mg daily. Has not been eating well.  Coronary calcium score in 2017 was 17 in LAD.  Results for HENRIETTA REN SIMMONS (MRN [id number]) as of 7/6/2018 07:50   Ref. Range 6/20/2018 10:19   Cholesterol,Tot Latest Ref Range: 100 - 199 mg/dL 274 (H)   Triglycerides Latest Ref Range: 0 - 149 mg/dL 133   HDL Latest Ref Range: >=40 mg/dL 52   LDL Latest Ref Range: <100 mg/dL 195 (H)

## 2018-07-25 ENCOUNTER — OFFICE VISIT (OUTPATIENT)
Dept: MEDICAL GROUP | Facility: MEDICAL CENTER | Age: 63
End: 2018-07-25
Payer: MEDICARE

## 2018-07-25 VITALS
DIASTOLIC BLOOD PRESSURE: 88 MMHG | TEMPERATURE: 98 F | HEIGHT: 65 IN | HEART RATE: 68 BPM | SYSTOLIC BLOOD PRESSURE: 142 MMHG | OXYGEN SATURATION: 97 % | BODY MASS INDEX: 25.92 KG/M2 | WEIGHT: 155.6 LBS

## 2018-07-25 DIAGNOSIS — R74.8 ELEVATED CPK: ICD-10-CM

## 2018-07-25 DIAGNOSIS — E78.00 HYPERCHOLESTEREMIA: ICD-10-CM

## 2018-07-25 DIAGNOSIS — I10 ESSENTIAL HYPERTENSION, BENIGN: ICD-10-CM

## 2018-07-25 PROCEDURE — 99214 OFFICE O/P EST MOD 30 MIN: CPT | Performed by: FAMILY MEDICINE

## 2018-07-25 RX ORDER — AMLODIPINE BESYLATE 5 MG/1
5 TABLET ORAL DAILY
Qty: 90 TAB | Refills: 3 | Status: SHIPPED | OUTPATIENT
Start: 2018-07-25 | End: 2018-08-30 | Stop reason: SDUPTHER

## 2018-07-25 NOTE — PROGRESS NOTES
Subjective:   Joanie Arenas is a 63 y.o. female here today for HTN    Essential hypertension, benign  2 weeks ago we have patient increase her lisinopril from 20 mg daily to 40 mg daily because of elevation in blood pressure.  No side effects with increase in lisinopril, she denies any lightheadedness.  She brought a home blood pressure log.  Blood pressure has been progressively improving, however recently she has had several readings per week when her diastolic pressure was over 90, as high as 99.    Hypercholesteremia  Patient is tolerating atorvastatin 80 mg daily.  She denies any muscle aches.  However her CPK was elevated, but it has been so for many years and she has been completely asymptomatic.  She had a coronary calcium score of 27 last year.    Elevated CPK  Patient denies any muscle weakness.  She is on atorvastatin 80 mg daily, which we tried to discontinue but did not see an improvement in her CPK.  She denies any use regular alcohol intake.         Current medicines (including changes today)  Current Outpatient Prescriptions   Medication Sig Dispense Refill   • amLODIPine (NORVASC) 5 MG Tab Take 1 Tab by mouth every day. 90 Tab 3   • lisinopril (PRINIVIL, ZESTRIL) 40 MG tablet Take 1 Tab by mouth every day. 90 Tab 3   • atorvastatin (LIPITOR) 80 MG tablet Take 1 Tab by mouth every day. 90 Tab 3   • nitrofurantoin (MACRODANTIN) 50 MG Cap Take 50 mg by mouth 4 times a day.     • fluticasone (FLONASE) 50 MCG/ACT nasal spray Spray 1 Spray in nose 2 times a day. 1 Bottle 0   • vitamin B comp+C (ALLBEE WITH C) Tab Take 1 Tab by mouth every day.     • ascorbic acid (ASCORBIC ACID) 500 MG Tab Take 500 mg by mouth every day.     • Cranberry 125 MG Tab Take  by mouth.     • aspirin (ASA) 81 MG Chew Tab chewable tablet Take 81 mg by mouth every day.     • vitamin D (CHOLECALCIFEROL) 1000 UNIT Tab Take 1,000 Units by mouth every day.     • Omega-3 Fatty Acids (SALMON OIL-1000 PO) Take  by mouth.     •  "Probiotic Product (PROBIOTIC DAILY PO) Take  by mouth.     • Glucosamine-Chondroit-Vit C-Mn (GLUCOSAMINE 1500 COMPLEX PO) Take  by mouth.     • CHONDROITIN SULFATE PO Take  by mouth.     • ibuprofen (MOTRIN) 200 MG Tab Take 200 mg by mouth every 6 hours as needed.     • Magnesium 300 MG Cap Take  by mouth.     • estradiol (ESTRACE) 0.1 MG/GM vaginal cream Use vaginally daily for one week, then twice a week 1 Tube 5     No current facility-administered medications for this visit.      She  has a past medical history of Arthritis (2014); Benign hypertension (2014); Cold (2014); Hypercholesteremia; and Pain (2014).    ROS   No chest pain, no shortness of breath       Objective:     Blood pressure 142/88, pulse 68, temperature 36.7 °C (98 °F), height 1.651 m (5' 5\"), weight 70.6 kg (155 lb 9.6 oz), SpO2 97 %. Body mass index is 25.89 kg/m².   Physical Exam:  Constitutional: Alert, no distress.  Skin: Warm, dry, good turgor, no rashes in visible areas.  Eye: Equal, round and reactive, conjunctiva clear, lids normal.  Psych: Alert and oriented x3, normal affect and mood.        Assessment and Plan:   The following treatment plan was discussed    1. Essential hypertension, benign  Uncontrolled.  We will continue lisinopril 40 mg daily and we will add amlodipine 5 mg daily.  Patient will my chart message me in 3 weeks with a blood pressure update.  She is to message me sooner if she becomes lightheaded.  Patient is to follow-up in 6 months with labs.  - amLODIPine (NORVASC) 5 MG Tab; Take 1 Tab by mouth every day.  Dispense: 90 Tab; Refill: 3  - CBC WITH DIFFERENTIAL; Future    2. Hypercholesteremia  Stable.  Continue atorvastatin 80 mg daily.  Follow-up in 6 months with labs.  - COMP METABOLIC PANEL; Future  - LIPID PROFILE; Future  - TSH WITH REFLEX TO FT4; Future    3. Elevated CPK  Asymptomatic and most likely benign.  Follow-up in 6 months with labs.  - CREATINE KINASE; Future      Followup: Return in about 6 months " (around 1/25/2019) for Labs.

## 2018-07-25 NOTE — ASSESSMENT & PLAN NOTE
Patient is tolerating atorvastatin 80 mg daily.  She denies any muscle aches.  However her CPK was elevated, but it has been so for many years and she has been completely asymptomatic.  She had a coronary calcium score of 27 last year.

## 2018-07-25 NOTE — ASSESSMENT & PLAN NOTE
2 weeks ago we have patient increase her lisinopril from 20 mg daily to 40 mg daily because of elevation in blood pressure.  No side effects with increase in lisinopril, she denies any lightheadedness.  She brought a home blood pressure log.  Blood pressure has been progressively improving, however recently she has had several readings per week when her diastolic pressure was over 90, as high as 99.

## 2018-07-25 NOTE — ASSESSMENT & PLAN NOTE
Patient denies any muscle weakness.  She is on atorvastatin 80 mg daily, which we tried to discontinue but did not see an improvement in her CPK.  She denies any use regular alcohol intake.

## 2018-07-30 ENCOUNTER — APPOINTMENT (OUTPATIENT)
Dept: RADIOLOGY | Facility: MEDICAL CENTER | Age: 63
End: 2018-07-30
Attending: FAMILY MEDICINE
Payer: MEDICARE

## 2018-08-08 ENCOUNTER — HOSPITAL ENCOUNTER (OUTPATIENT)
Dept: RADIOLOGY | Facility: MEDICAL CENTER | Age: 63
End: 2018-08-08
Attending: FAMILY MEDICINE
Payer: MEDICARE

## 2018-08-08 ENCOUNTER — TELEPHONE (OUTPATIENT)
Dept: MEDICAL GROUP | Facility: MEDICAL CENTER | Age: 63
End: 2018-08-08

## 2018-08-08 DIAGNOSIS — Z78.0 ASYMPTOMATIC MENOPAUSAL STATE: ICD-10-CM

## 2018-08-08 PROCEDURE — 77080 DXA BONE DENSITY AXIAL: CPT

## 2018-08-08 NOTE — TELEPHONE ENCOUNTER
----- Message from Asa Gordon M.D. sent at 8/8/2018  4:11 PM PDT -----  Please notify patient that she has osteopenia, which is a slight weakening of the bones. We recommend calcium 1200 mg daily with food and vitamin D 2000 units daily with food. We also recommend regular weightbearing exercise such as walking daily.  Bone density should be rechecked in 2-5 years.  Asa Gordon MD

## 2018-08-09 ENCOUNTER — PATIENT MESSAGE (OUTPATIENT)
Dept: MEDICAL GROUP | Facility: MEDICAL CENTER | Age: 63
End: 2018-08-09

## 2018-08-30 ENCOUNTER — PATIENT MESSAGE (OUTPATIENT)
Dept: MEDICAL GROUP | Facility: MEDICAL CENTER | Age: 63
End: 2018-08-30

## 2018-08-30 DIAGNOSIS — I10 ESSENTIAL HYPERTENSION, BENIGN: ICD-10-CM

## 2018-08-30 RX ORDER — AMLODIPINE BESYLATE 10 MG/1
10 TABLET ORAL DAILY
Qty: 90 TAB | Refills: 3 | Status: SHIPPED | OUTPATIENT
Start: 2018-08-30 | End: 2018-09-25 | Stop reason: SDUPTHER

## 2018-08-30 NOTE — TELEPHONE ENCOUNTER
From: Joanie Arenas  To: Asa Gordon M.D.  Sent: 8/30/2018 11:35 AM PDT  Subject: Non-Urgent Medical Question    Hi Dr. Gordon;  I increased blood pressure meds to : 40 mg Lisinopril and 10 mg Amlodapine on Aug. 11, 2018.    My blood pressure has regulated to generally 128/84. Some times a bit higher when I'm needing to take   another dose. (evening) of about 132/92.    The highest I've had since starting the new med is 132/95 very occasionally   and the lowest reading I've gotten is 112/74. also only occasionally.  If this is acceptable i'll need to get new prescriptions because I'm doubling what I have and will run out twice as fast.   Thank you, Joanie Arenas [birth year]

## 2018-09-25 ENCOUNTER — HOSPITAL ENCOUNTER (OUTPATIENT)
Dept: LAB | Facility: MEDICAL CENTER | Age: 63
End: 2018-09-25
Attending: FAMILY MEDICINE
Payer: MEDICARE

## 2018-09-25 ENCOUNTER — OFFICE VISIT (OUTPATIENT)
Dept: MEDICAL GROUP | Facility: MEDICAL CENTER | Age: 63
End: 2018-09-25
Payer: MEDICARE

## 2018-09-25 VITALS
TEMPERATURE: 98.1 F | DIASTOLIC BLOOD PRESSURE: 82 MMHG | BODY MASS INDEX: 25.66 KG/M2 | HEART RATE: 69 BPM | HEIGHT: 65 IN | WEIGHT: 154 LBS | SYSTOLIC BLOOD PRESSURE: 134 MMHG | OXYGEN SATURATION: 97 %

## 2018-09-25 DIAGNOSIS — Z23 NEED FOR VACCINATION: ICD-10-CM

## 2018-09-25 DIAGNOSIS — I10 ESSENTIAL HYPERTENSION, BENIGN: ICD-10-CM

## 2018-09-25 LAB
ANION GAP SERPL CALC-SCNC: 9 MMOL/L (ref 0–11.9)
BUN SERPL-MCNC: 10 MG/DL (ref 8–22)
CALCIUM SERPL-MCNC: 9.3 MG/DL (ref 8.5–10.5)
CHLORIDE SERPL-SCNC: 106 MMOL/L (ref 96–112)
CO2 SERPL-SCNC: 27 MMOL/L (ref 20–33)
CREAT SERPL-MCNC: 0.63 MG/DL (ref 0.5–1.4)
GLUCOSE SERPL-MCNC: 77 MG/DL (ref 65–99)
POTASSIUM SERPL-SCNC: 3.7 MMOL/L (ref 3.6–5.5)
SODIUM SERPL-SCNC: 142 MMOL/L (ref 135–145)

## 2018-09-25 PROCEDURE — 36415 COLL VENOUS BLD VENIPUNCTURE: CPT

## 2018-09-25 PROCEDURE — 80048 BASIC METABOLIC PNL TOTAL CA: CPT

## 2018-09-25 PROCEDURE — 99214 OFFICE O/P EST MOD 30 MIN: CPT | Mod: 25 | Performed by: FAMILY MEDICINE

## 2018-09-25 PROCEDURE — 90662 IIV NO PRSV INCREASED AG IM: CPT | Performed by: FAMILY MEDICINE

## 2018-09-25 PROCEDURE — 93000 ELECTROCARDIOGRAM COMPLETE: CPT | Performed by: FAMILY MEDICINE

## 2018-09-25 PROCEDURE — G0008 ADMIN INFLUENZA VIRUS VAC: HCPCS | Performed by: FAMILY MEDICINE

## 2018-09-25 RX ORDER — AMLODIPINE BESYLATE 5 MG/1
5 TABLET ORAL DAILY
COMMUNITY
Start: 2018-09-25 | End: 2018-12-11 | Stop reason: SDUPTHER

## 2018-09-25 RX ORDER — LISINOPRIL 20 MG/1
20 TABLET ORAL DAILY
COMMUNITY
Start: 2018-09-25 | End: 2018-10-08

## 2018-09-25 NOTE — PROGRESS NOTES
Subjective:   Joanie Arenas is a 63 y.o. female here today for hypertension    Essential hypertension, benign  Patient reduced her amlodipine dose from 10 mg to 5 mg daily because she was getting hypotensive in getting lightheaded despite drinking plenty of fluids.  Her blood pressures have improved but she still has episodes of systolic blood pressures in the 90s and diastolic blood pressures in the 60s, which causes her to be lightheaded.  Her high blood pressure readings are around 130s over 80s.  She is currently on lisinopril 40 mg daily.    Patient is planned to have a tummy tuck and plastic surgeon has requested an EKG and BMP.  Patient had a coronary calcium score last year which showed minimal plaque in the coronary arteries.         Current medicines (including changes today)  Current Outpatient Prescriptions   Medication Sig Dispense Refill   • amLODIPine (NORVASC) 10 MG Tab Take 1 Tab by mouth every day. 90 Tab 3   • lisinopril (PRINIVIL, ZESTRIL) 40 MG tablet Take 1 Tab by mouth every day. 90 Tab 3   • atorvastatin (LIPITOR) 80 MG tablet Take 1 Tab by mouth every day. 90 Tab 3   • nitrofurantoin (MACRODANTIN) 50 MG Cap Take 50 mg by mouth 4 times a day.     • fluticasone (FLONASE) 50 MCG/ACT nasal spray Spray 1 Spray in nose 2 times a day. 1 Bottle 0   • vitamin B comp+C (ALLBEE WITH C) Tab Take 1 Tab by mouth every day.     • ascorbic acid (ASCORBIC ACID) 500 MG Tab Take 500 mg by mouth every day.     • Cranberry 125 MG Tab Take  by mouth.     • aspirin (ASA) 81 MG Chew Tab chewable tablet Take 81 mg by mouth every day.     • vitamin D (CHOLECALCIFEROL) 1000 UNIT Tab Take 1,000 Units by mouth every day.     • Omega-3 Fatty Acids (SALMON OIL-1000 PO) Take  by mouth.     • Probiotic Product (PROBIOTIC DAILY PO) Take  by mouth.     • Glucosamine-Chondroit-Vit C-Mn (GLUCOSAMINE 1500 COMPLEX PO) Take  by mouth.     • CHONDROITIN SULFATE PO Take  by mouth.     • ibuprofen (MOTRIN) 200 MG Tab Take 200  "mg by mouth every 6 hours as needed.     • Magnesium 300 MG Cap Take  by mouth.     • estradiol (ESTRACE) 0.1 MG/GM vaginal cream Use vaginally daily for one week, then twice a week 1 Tube 5     No current facility-administered medications for this visit.      She  has a past medical history of Arthritis (2014); Benign hypertension (2014); Cold (2014); Hypercholesteremia; and Pain (2014).    ROS   No chest pain, no shortness of breath       Objective:     Blood pressure 134/82, pulse 69, temperature 36.7 °C (98.1 °F), height 1.651 m (5' 5\"), weight 69.9 kg (154 lb), SpO2 97 %, not currently breastfeeding. Body mass index is 25.63 kg/m².   Physical Exam:  Constitutional: Alert, no distress.  Skin: Warm, dry, good turgor, no rashes in visible areas.  Eye: Equal, round and reactive, conjunctiva clear, lids normal.  ENMT: Lips without lesions, good dentition, oropharynx clear.  Psych: Alert and oriented x3, normal affect and mood.    EKG Interpretation-HR is 62 normal EKG, normal sinus rhythm          Assessment and Plan:   The following treatment plan was discussed    1. Essential hypertension, benign  Uncontrolled with slightly low and symptomatic blood pressure.  Decrease lisinopril from 40 mg daily to 20 mg daily.  Continue amlodipine 5 mg daily.  Patient is to message with blood pressure and symptoms in approximately 2 weeks.  EKG normal, fax to plastic surgeon office.  BMP ordered.  - EKG  - BASIC METABOLIC PANEL; Future    2. Need for vaccination  - INFLUENZA VACCINE, HIGH DOSE (65+ ONLY)      Followup: Return in about 6 months (around 3/25/2019) for HTN.         Total 20 minutes face-to-face time spent with patient, with greater than 50% of the total time discussing patient's issues and symptoms as listed above in assessment and plan, as well as managing coordination of care for future evaluation and treatment.    "

## 2018-09-25 NOTE — ASSESSMENT & PLAN NOTE
Patient reduced her amlodipine dose from 10 mg to 5 mg daily because she was getting hypotensive in getting lightheaded despite drinking plenty of fluids.  Her blood pressures have improved but she still has episodes of systolic blood pressures in the 90s and diastolic blood pressures in the 60s, which causes her to be lightheaded.  Her high blood pressure readings are around 130s over 80s.  She is currently on lisinopril 40 mg daily.    Patient is planned to have a tummy tuck and plastic surgeon has requested an EKG and BMP.  Patient had a coronary calcium score last year which showed minimal plaque in the coronary arteries.

## 2018-10-08 DIAGNOSIS — I10 ESSENTIAL HYPERTENSION, BENIGN: ICD-10-CM

## 2018-10-08 RX ORDER — LISINOPRIL 40 MG/1
TABLET ORAL
Qty: 90 TAB | Refills: 3 | Status: SHIPPED | OUTPATIENT
Start: 2018-10-08 | End: 2019-08-05 | Stop reason: SDUPTHER

## 2018-11-30 ENCOUNTER — HOSPITAL ENCOUNTER (OUTPATIENT)
Dept: LAB | Facility: MEDICAL CENTER | Age: 63
End: 2018-11-30
Attending: FAMILY MEDICINE
Payer: MEDICARE

## 2018-11-30 DIAGNOSIS — E78.00 HYPERCHOLESTEREMIA: ICD-10-CM

## 2018-11-30 DIAGNOSIS — I10 ESSENTIAL HYPERTENSION, BENIGN: ICD-10-CM

## 2018-11-30 DIAGNOSIS — R74.8 ELEVATED CPK: ICD-10-CM

## 2018-11-30 LAB
ALBUMIN SERPL BCP-MCNC: 4.5 G/DL (ref 3.2–4.9)
ALBUMIN/GLOB SERPL: 2 G/DL
ALP SERPL-CCNC: 61 U/L (ref 30–99)
ALT SERPL-CCNC: 18 U/L (ref 2–50)
ANION GAP SERPL CALC-SCNC: 8 MMOL/L (ref 0–11.9)
AST SERPL-CCNC: 20 U/L (ref 12–45)
BASOPHILS # BLD AUTO: 0.6 % (ref 0–1.8)
BASOPHILS # BLD: 0.03 K/UL (ref 0–0.12)
BILIRUB SERPL-MCNC: 0.6 MG/DL (ref 0.1–1.5)
BUN SERPL-MCNC: 13 MG/DL (ref 8–22)
CALCIUM SERPL-MCNC: 9.4 MG/DL (ref 8.5–10.5)
CHLORIDE SERPL-SCNC: 106 MMOL/L (ref 96–112)
CHOLEST SERPL-MCNC: 228 MG/DL (ref 100–199)
CK SERPL-CCNC: 190 U/L (ref 0–154)
CO2 SERPL-SCNC: 28 MMOL/L (ref 20–33)
CREAT SERPL-MCNC: 0.64 MG/DL (ref 0.5–1.4)
EOSINOPHIL # BLD AUTO: 0.11 K/UL (ref 0–0.51)
EOSINOPHIL NFR BLD: 2.4 % (ref 0–6.9)
ERYTHROCYTE [DISTWIDTH] IN BLOOD BY AUTOMATED COUNT: 43.8 FL (ref 35.9–50)
FASTING STATUS PATIENT QL REPORTED: NORMAL
GLOBULIN SER CALC-MCNC: 2.2 G/DL (ref 1.9–3.5)
GLUCOSE SERPL-MCNC: 89 MG/DL (ref 65–99)
HCT VFR BLD AUTO: 42.9 % (ref 37–47)
HDLC SERPL-MCNC: 45 MG/DL
HGB BLD-MCNC: 13.8 G/DL (ref 12–16)
IMM GRANULOCYTES # BLD AUTO: 0.01 K/UL (ref 0–0.11)
IMM GRANULOCYTES NFR BLD AUTO: 0.2 % (ref 0–0.9)
LDLC SERPL CALC-MCNC: 163 MG/DL
LYMPHOCYTES # BLD AUTO: 1.77 K/UL (ref 1–4.8)
LYMPHOCYTES NFR BLD: 37.9 % (ref 22–41)
MCH RBC QN AUTO: 29.7 PG (ref 27–33)
MCHC RBC AUTO-ENTMCNC: 32.2 G/DL (ref 33.6–35)
MCV RBC AUTO: 92.3 FL (ref 81.4–97.8)
MONOCYTES # BLD AUTO: 0.46 K/UL (ref 0–0.85)
MONOCYTES NFR BLD AUTO: 9.9 % (ref 0–13.4)
NEUTROPHILS # BLD AUTO: 2.29 K/UL (ref 2–7.15)
NEUTROPHILS NFR BLD: 49 % (ref 44–72)
NRBC # BLD AUTO: 0 K/UL
NRBC BLD-RTO: 0 /100 WBC
PLATELET # BLD AUTO: 232 K/UL (ref 164–446)
PMV BLD AUTO: 10.7 FL (ref 9–12.9)
POTASSIUM SERPL-SCNC: 4.3 MMOL/L (ref 3.6–5.5)
PROT SERPL-MCNC: 6.7 G/DL (ref 6–8.2)
RBC # BLD AUTO: 4.65 M/UL (ref 4.2–5.4)
SODIUM SERPL-SCNC: 142 MMOL/L (ref 135–145)
TRIGL SERPL-MCNC: 100 MG/DL (ref 0–149)
TSH SERPL DL<=0.005 MIU/L-ACNC: 2.08 UIU/ML (ref 0.38–5.33)
WBC # BLD AUTO: 4.7 K/UL (ref 4.8–10.8)

## 2018-11-30 PROCEDURE — 36415 COLL VENOUS BLD VENIPUNCTURE: CPT

## 2018-11-30 PROCEDURE — 80053 COMPREHEN METABOLIC PANEL: CPT

## 2018-11-30 PROCEDURE — 80061 LIPID PANEL: CPT

## 2018-11-30 PROCEDURE — 85025 COMPLETE CBC W/AUTO DIFF WBC: CPT

## 2018-11-30 PROCEDURE — 82550 ASSAY OF CK (CPK): CPT

## 2018-11-30 PROCEDURE — 84443 ASSAY THYROID STIM HORMONE: CPT

## 2018-12-11 ENCOUNTER — OFFICE VISIT (OUTPATIENT)
Dept: MEDICAL GROUP | Facility: MEDICAL CENTER | Age: 63
End: 2018-12-11
Payer: MEDICARE

## 2018-12-11 VITALS
BODY MASS INDEX: 24.32 KG/M2 | WEIGHT: 146 LBS | TEMPERATURE: 97.6 F | DIASTOLIC BLOOD PRESSURE: 80 MMHG | HEIGHT: 65 IN | HEART RATE: 77 BPM | SYSTOLIC BLOOD PRESSURE: 126 MMHG | OXYGEN SATURATION: 97 %

## 2018-12-11 DIAGNOSIS — I10 ESSENTIAL HYPERTENSION, BENIGN: ICD-10-CM

## 2018-12-11 DIAGNOSIS — E78.00 HYPERCHOLESTEREMIA: ICD-10-CM

## 2018-12-11 DIAGNOSIS — R74.8 ELEVATED CPK: ICD-10-CM

## 2018-12-11 PROCEDURE — 99214 OFFICE O/P EST MOD 30 MIN: CPT | Performed by: FAMILY MEDICINE

## 2018-12-11 RX ORDER — AMLODIPINE BESYLATE 10 MG/1
10 TABLET ORAL DAILY
Qty: 90 TAB | Refills: 3 | Status: SHIPPED | OUTPATIENT
Start: 2018-12-11 | End: 2020-02-27

## 2018-12-11 RX ORDER — ATORVASTATIN CALCIUM 80 MG/1
80 TABLET, FILM COATED ORAL
Qty: 90 TAB | Refills: 3 | Status: SHIPPED | OUTPATIENT
Start: 2018-12-11 | End: 2018-12-12

## 2018-12-11 NOTE — ASSESSMENT & PLAN NOTE
Patient is tolerating atorvastatin 80 mg daily without any side effects.  She has a strong history of familial hyperlipidemia, patient states that her total cholesterol was over 400 at one point in her life.    CT coronary calcium score done in June 2017:  Coronary calcification:  LMA - 0.0  LCX - 0.0  LAD - 17.4  RCA - 9.6  PDA - 0.0

## 2018-12-11 NOTE — ASSESSMENT & PLAN NOTE
Patient's CPK level is going down from over 300 to now 190.  She is unsure what caused this elevation.  Previously she was working out twice weekly doing cardiovascular exercise, she has not been working out recently because she just had tummy tuck surgery.

## 2018-12-11 NOTE — PROGRESS NOTES
Subjective:   Joanie Arenas is a 63 y.o. female here today for hypertension, hyperlipidemia, CPK    Elevated CPK  Patient's CPK level is going down from over 300 to now 190.  She is unsure what caused this elevation.  Previously she was working out twice weekly doing cardiovascular exercise, she has not been working out recently because she just had tummy tuck surgery.    Hypercholesteremia  Patient is tolerating atorvastatin 80 mg daily without any side effects.  She has a strong history of familial hyperlipidemia, patient states that her total cholesterol was over 400 at one point in her life.    CT coronary calcium score done in June 2017:  Coronary calcification:  LMA - 0.0  LCX - 0.0  LAD - 17.4  RCA - 9.6  PDA - 0.0    Essential hypertension, benign  Patient is tolerating amlodipine 10 mg daily and lisinopril 40 mg daily.  She states that her new blood pressure cuff is not working well because she is getting low readings, although she is getting no dizziness.  Reviewed blood pressure cuff with patient and he appears to big for her arm.         Current medicines (including changes today)  Current Outpatient Prescriptions   Medication Sig Dispense Refill   • amLODIPine (NORVASC) 10 MG Tab Take 1 Tab by mouth every day. 90 Tab 3   • atorvastatin (LIPITOR) 80 MG tablet Take 1 Tab by mouth every day. 90 Tab 3   • lisinopril (PRINIVIL, ZESTRIL) 40 MG tablet TAKE 1 TABLET BY MOUTH EVERY DAY 90 Tab 3   • nitrofurantoin (MACRODANTIN) 50 MG Cap Take 50 mg by mouth 4 times a day.     • fluticasone (FLONASE) 50 MCG/ACT nasal spray Spray 1 Spray in nose 2 times a day. 1 Bottle 0   • vitamin B comp+C (ALLBEE WITH C) Tab Take 1 Tab by mouth every day.     • ascorbic acid (ASCORBIC ACID) 500 MG Tab Take 500 mg by mouth every day.     • Cranberry 125 MG Tab Take  by mouth.     • aspirin (ASA) 81 MG Chew Tab chewable tablet Take 81 mg by mouth every day.     • vitamin D (CHOLECALCIFEROL) 1000 UNIT Tab Take 1,000 Units  "by mouth every day.     • Omega-3 Fatty Acids (SALMON OIL-1000 PO) Take  by mouth.     • Probiotic Product (PROBIOTIC DAILY PO) Take  by mouth.     • Glucosamine-Chondroit-Vit C-Mn (GLUCOSAMINE 1500 COMPLEX PO) Take  by mouth.     • CHONDROITIN SULFATE PO Take  by mouth.     • ibuprofen (MOTRIN) 200 MG Tab Take 200 mg by mouth every 6 hours as needed.     • Magnesium 300 MG Cap Take  by mouth.     • estradiol (ESTRACE) 0.1 MG/GM vaginal cream Use vaginally daily for one week, then twice a week 1 Tube 5     No current facility-administered medications for this visit.      She  has a past medical history of Arthritis (2014); Benign hypertension (2014); Cold (2014); Hypercholesteremia; and Pain (2014).    ROS   No chest pain, no shortness of breath       Objective:     Blood pressure 126/80, pulse 77, temperature 36.4 °C (97.6 °F), height 1.651 m (5' 5\"), weight 66.2 kg (146 lb), SpO2 97 %, not currently breastfeeding. Body mass index is 24.3 kg/m².   Physical Exam:  Constitutional: Alert, no distress.  Skin: Warm, dry, good turgor, no rashes in visible areas.  Eye: Equal, round and reactive, conjunctiva clear, lids normal.  Psych: Alert and oriented x3, normal affect and mood.        Assessment and Plan:   The following treatment plan was discussed    1. Essential hypertension, benign  Controlled.  Continue amlodipine and lisinopril.  Advised patient to look into a better fitting blood pressure cuff.  Follow-up in 6 months.  - amLODIPine (NORVASC) 10 MG Tab; Take 1 Tab by mouth every day.  Dispense: 90 Tab; Refill: 3  - CBC WITH DIFFERENTIAL; Future    2. Hypercholesteremia  Controlled as best as we can given her genetics.  Continue atorvastatin 80 mg daily.  Consider repeating CT cardiac scoring next year.  - atorvastatin (LIPITOR) 80 MG tablet; Take 1 Tab by mouth every day.  Dispense: 90 Tab; Refill: 3  - COMP METABOLIC PANEL; Future  - Lipid Profile; Future    3. Elevated CPK  Improving, most likely benign.  " Continue to follow.  - CREATINE KINASE; Future      Followup: Return in about 6 months (around 6/11/2019) for HTN.

## 2018-12-11 NOTE — ASSESSMENT & PLAN NOTE
Patient is tolerating amlodipine 10 mg daily and lisinopril 40 mg daily.  She states that her new blood pressure cuff is not working well because she is getting low readings, although she is getting no dizziness.  Reviewed blood pressure cuff with patient and he appears to big for her arm.

## 2018-12-12 ENCOUNTER — OFFICE VISIT (OUTPATIENT)
Dept: CARDIOLOGY | Facility: MEDICAL CENTER | Age: 63
End: 2018-12-12
Payer: MEDICARE

## 2018-12-12 VITALS
HEART RATE: 70 BPM | HEIGHT: 65 IN | DIASTOLIC BLOOD PRESSURE: 70 MMHG | OXYGEN SATURATION: 98 % | WEIGHT: 146 LBS | BODY MASS INDEX: 24.32 KG/M2 | SYSTOLIC BLOOD PRESSURE: 108 MMHG

## 2018-12-12 DIAGNOSIS — R74.8 ELEVATED CPK: ICD-10-CM

## 2018-12-12 DIAGNOSIS — E78.00 HYPERCHOLESTEREMIA: ICD-10-CM

## 2018-12-12 DIAGNOSIS — Z82.49 FAMILY HISTORY OF EARLY CAD: ICD-10-CM

## 2018-12-12 DIAGNOSIS — I10 ESSENTIAL HYPERTENSION, BENIGN: ICD-10-CM

## 2018-12-12 PROCEDURE — 99202 OFFICE O/P NEW SF 15 MIN: CPT | Performed by: INTERNAL MEDICINE

## 2018-12-12 RX ORDER — ROSUVASTATIN CALCIUM 40 MG/1
40 TABLET, COATED ORAL DAILY
Qty: 90 TAB | Refills: 3 | Status: SHIPPED | OUTPATIENT
Start: 2018-12-12 | End: 2019-12-01 | Stop reason: SDUPTHER

## 2018-12-12 ASSESSMENT — ENCOUNTER SYMPTOMS
NEUROLOGICAL NEGATIVE: 1
CONSTITUTIONAL NEGATIVE: 1
RESPIRATORY NEGATIVE: 1
GASTROINTESTINAL NEGATIVE: 1

## 2018-12-12 NOTE — LETTER
[facility]   [address] [place] [address]  [place] [address]  Phone: [phone]  Fax: [phone]              Joanie Arenas  1955    Encounter Date: 2018    Mauro Quinones M.D.          PROGRESS NOTE:  Chief Complaint   Patient presents with   • HTN (Controlled)       Subjective:   Joanie Arenas is a 63 y.o. female who presents today primarily for evaluation of hyperlipidemia.  The patient has never been genetically tested for familial hyperlipidemia but most certainly has it.  Her daughter is documented FH, and her father  at 32 from a heart attack.  Labs in  revealed an LDL of 195 while on 40 mg of atorvastatin.  She is recently increased to 80 mg a day and on that dose her LDL is 163.  Coronary artery calcification score in  that revealed a score of 27 which was surprisingly low.  She has no history of exercise intolerance or exertional chest pain.  She has occasional episodes of very localized nonexertional chest pain of the left lower sternal border.  This does not sound anginal at all.  She has history of hypertension.    Past Medical History:   Diagnosis Date   • Arthritis     hands and back   • Benign hypertension     well controlled on meds pt states increased with anxiety   • Cold  end of cold symptoms   • Hypercholesteremia    • Pain 2014    6/10     Past Surgical History:   Procedure Laterality Date   • LUMBAR FUSION POSTERIOR  2014    Performed by Maci Babb M.D. at SURGERY [facility]   • LUMBAR LAMINECTOMY DISKECTOMY  2014    Performed by Maci Babb M.D. at SURGERY [facility]   • LUMBAR DECOMPRESSION  2014    Performed by Maci Babb M.D. at SURGERY [facility]   • HYSTERECTOMY, TOTAL ABDOMINAL     • TONSILLECTOMY       Family History   Problem Relation Age of Onset   • Heart Disease Father          from MI at age 32   • Hyperlipidemia  Father    • GI Mother         GERD   • Hypertension Mother    • Hyperlipidemia Daughter    • Hyperlipidemia Grandchild      Social History     Social History   • Marital status:      Spouse name: N/A   • Number of children: N/A   • Years of education: N/A     Occupational History   • Not on file.     Social History Main Topics   • Smoking status: Never Smoker   • Smokeless tobacco: Never Used   • Alcohol use 0.5 oz/week     1 Cans of beer per week      Comment: 2 per week   • Drug use: No   • Sexual activity: Yes     Partners: Male     Other Topics Concern   • Not on file     Social History Narrative   • No narrative on file     No Known Allergies  Outpatient Encounter Prescriptions as of 12/12/2018   Medication Sig Dispense Refill   • rosuvastatin (CRESTOR) 40 MG tablet Take 1 Tab by mouth every day. 90 Tab 3   • amLODIPine (NORVASC) 10 MG Tab Take 1 Tab by mouth every day. 90 Tab 3   • lisinopril (PRINIVIL, ZESTRIL) 40 MG tablet TAKE 1 TABLET BY MOUTH EVERY DAY 90 Tab 3   • ascorbic acid (ASCORBIC ACID) 500 MG Tab Take 500 mg by mouth every day.     • vitamin D (CHOLECALCIFEROL) 1000 UNIT Tab Take 1,000 Units by mouth every day.     • estradiol (ESTRACE) 0.1 MG/GM vaginal cream Use vaginally daily for one week, then twice a week 1 Tube 5   • [DISCONTINUED] atorvastatin (LIPITOR) 80 MG tablet Take 1 Tab by mouth every day. 90 Tab 3   • nitrofurantoin (MACRODANTIN) 50 MG Cap Take 50 mg by mouth 4 times a day.     • fluticasone (FLONASE) 50 MCG/ACT nasal spray Spray 1 Spray in nose 2 times a day. (Patient not taking: Reported on 12/12/2018) 1 Bottle 0   • vitamin B comp+C (ALLBEE WITH C) Tab Take 1 Tab by mouth every day.     • Cranberry 125 MG Tab Take  by mouth.     • aspirin (ASA) 81 MG Chew Tab chewable tablet Take 81 mg by mouth every day.     • Omega-3 Fatty Acids (SALMON OIL-1000 PO) Take  by mouth.     • Probiotic Product (PROBIOTIC DAILY PO) Take  by mouth.     • Glucosamine-Chondroit-Vit C-Mn  "(GLUCOSAMINE 1500 COMPLEX PO) Take  by mouth.     • CHONDROITIN SULFATE PO Take  by mouth.     • ibuprofen (MOTRIN) 200 MG Tab Take 200 mg by mouth every 6 hours as needed.     • Magnesium 300 MG Cap Take  by mouth.       No facility-administered encounter medications on file as of 12/12/2018.      Review of Systems   Constitutional: Negative.    HENT: Negative.    Respiratory: Negative.    Cardiovascular: Positive for chest pain.   Gastrointestinal: Negative.    Musculoskeletal: Positive for joint pain.   Skin: Negative.    Neurological: Negative.    Endo/Heme/Allergies: Negative.         Objective:   /70 (BP Location: Left arm, Patient Position: Sitting, BP Cuff Size: Adult)   Pulse 70   Ht 1.651 m (5' 5\")   Wt 66.2 kg (146 lb)   SpO2 98%   BMI 24.30 kg/m²      Physical Exam   Constitutional: She is oriented to person, place, and time. No distress.   HENT:   Head: Normocephalic and atraumatic.   Eyes: Pupils are equal, round, and reactive to light. No scleral icterus.   Neck: No JVD present.   Cardiovascular: Normal rate and regular rhythm.    No murmur heard.  Pulmonary/Chest: No respiratory distress. She has no wheezes.   Abdominal: Soft. She exhibits no distension. There is no tenderness.   Musculoskeletal: She exhibits no edema.   Neurological: She is alert and oriented to person, place, and time.   Skin: Skin is warm.   Psychiatric: She has a normal mood and affect.       Assessment:     1. Essential hypertension, benign     2. Family history of early CAD     3. Hypercholesteremia     4. Elevated CPK         Medical Decision Making:  Today's Assessment / Status / Plan:   Hyperlipidemia: The patient almost certainly has familial hyperlipidemia.  She is never been genetically tested.  We discussed this today.  She also has a daughter who is never been tested and would recommend she be tested as well.  We will change her to rosuvastatin 40 mg a day and check lab on that dose of medication.  Consider " placing her on a  PCSK9 inhibitor if her cholesterol cannot be controlled with the high-dose rosuvastatin.  Again, genetic testing advised.    Hypertension: Under good control with current medication.  Return one year.      Asa Gordon M.D.  [address]  [id number]  [place] [id number]  VIA In Basket

## 2018-12-12 NOTE — PROGRESS NOTES
Chief Complaint   Patient presents with   • HTN (Controlled)       Subjective:   Joanie Arenas is a 63 y.o. female who presents today primarily for evaluation of hyperlipidemia.  The patient has never been genetically tested for familial hyperlipidemia but most certainly has it.  Her daughter is documented FH, and her father  at 32 from a heart attack.  Labs in  revealed an LDL of 195 while on 40 mg of atorvastatin.  She is recently increased to 80 mg a day and on that dose her LDL is 163.  Coronary artery calcification score in 2017 that revealed a score of 27 which was surprisingly low.  She has no history of exercise intolerance or exertional chest pain.  She has occasional episodes of very localized nonexertional chest pain of the left lower sternal border.  This does not sound anginal at all.  She has history of hypertension.    Past Medical History:   Diagnosis Date   • Arthritis     hands and back   • Benign hypertension     well controlled on meds pt states increased with anxiety   • Cold  end of cold symptoms   • Hypercholesteremia    • Pain 2014    6/10     Past Surgical History:   Procedure Laterality Date   • LUMBAR FUSION POSTERIOR  2014    Performed by Maci Babb M.D. at SURGERY [facility]   • LUMBAR LAMINECTOMY DISKECTOMY  2014    Performed by Maci Babb M.D. at SURGERY [facility]   • LUMBAR DECOMPRESSION  2014    Performed by Maci Babb M.D. at SURGERY [facility]   • HYSTERECTOMY, TOTAL ABDOMINAL     • TONSILLECTOMY       Family History   Problem Relation Age of Onset   • Heart Disease Father          from MI at age 32   • Hyperlipidemia Father    • GI Mother         GERD   • Hypertension Mother    • Hyperlipidemia Daughter    • Hyperlipidemia Grandchild      Social History     Social History   • Marital status:      Spouse name: N/A   • Number of children: N/A   • Years of education: N/A      Occupational History   • Not on file.     Social History Main Topics   • Smoking status: Never Smoker   • Smokeless tobacco: Never Used   • Alcohol use 0.5 oz/week     1 Cans of beer per week      Comment: 2 per week   • Drug use: No   • Sexual activity: Yes     Partners: Male     Other Topics Concern   • Not on file     Social History Narrative   • No narrative on file     No Known Allergies  Outpatient Encounter Prescriptions as of 12/12/2018   Medication Sig Dispense Refill   • rosuvastatin (CRESTOR) 40 MG tablet Take 1 Tab by mouth every day. 90 Tab 3   • amLODIPine (NORVASC) 10 MG Tab Take 1 Tab by mouth every day. 90 Tab 3   • lisinopril (PRINIVIL, ZESTRIL) 40 MG tablet TAKE 1 TABLET BY MOUTH EVERY DAY 90 Tab 3   • ascorbic acid (ASCORBIC ACID) 500 MG Tab Take 500 mg by mouth every day.     • vitamin D (CHOLECALCIFEROL) 1000 UNIT Tab Take 1,000 Units by mouth every day.     • estradiol (ESTRACE) 0.1 MG/GM vaginal cream Use vaginally daily for one week, then twice a week 1 Tube 5   • [DISCONTINUED] atorvastatin (LIPITOR) 80 MG tablet Take 1 Tab by mouth every day. 90 Tab 3   • nitrofurantoin (MACRODANTIN) 50 MG Cap Take 50 mg by mouth 4 times a day.     • fluticasone (FLONASE) 50 MCG/ACT nasal spray Spray 1 Spray in nose 2 times a day. (Patient not taking: Reported on 12/12/2018) 1 Bottle 0   • vitamin B comp+C (ALLBEE WITH C) Tab Take 1 Tab by mouth every day.     • Cranberry 125 MG Tab Take  by mouth.     • aspirin (ASA) 81 MG Chew Tab chewable tablet Take 81 mg by mouth every day.     • Omega-3 Fatty Acids (SALMON OIL-1000 PO) Take  by mouth.     • Probiotic Product (PROBIOTIC DAILY PO) Take  by mouth.     • Glucosamine-Chondroit-Vit C-Mn (GLUCOSAMINE 1500 COMPLEX PO) Take  by mouth.     • CHONDROITIN SULFATE PO Take  by mouth.     • ibuprofen (MOTRIN) 200 MG Tab Take 200 mg by mouth every 6 hours as needed.     • Magnesium 300 MG Cap Take  by mouth.       No facility-administered encounter medications  "on file as of 12/12/2018.      Review of Systems   Constitutional: Negative.    HENT: Negative.    Respiratory: Negative.    Cardiovascular: Positive for chest pain.   Gastrointestinal: Negative.    Musculoskeletal: Positive for joint pain.   Skin: Negative.    Neurological: Negative.    Endo/Heme/Allergies: Negative.         Objective:   /70 (BP Location: Left arm, Patient Position: Sitting, BP Cuff Size: Adult)   Pulse 70   Ht 1.651 m (5' 5\")   Wt 66.2 kg (146 lb)   SpO2 98%   BMI 24.30 kg/m²     Physical Exam   Constitutional: She is oriented to person, place, and time. No distress.   HENT:   Head: Normocephalic and atraumatic.   Eyes: Pupils are equal, round, and reactive to light. No scleral icterus.   Neck: No JVD present.   Cardiovascular: Normal rate and regular rhythm.    No murmur heard.  Pulmonary/Chest: No respiratory distress. She has no wheezes.   Abdominal: Soft. She exhibits no distension. There is no tenderness.   Musculoskeletal: She exhibits no edema.   Neurological: She is alert and oriented to person, place, and time.   Skin: Skin is warm.   Psychiatric: She has a normal mood and affect.       Assessment:     1. Essential hypertension, benign     2. Family history of early CAD     3. Hypercholesteremia     4. Elevated CPK         Medical Decision Making:  Today's Assessment / Status / Plan:   Hyperlipidemia: The patient almost certainly has familial hyperlipidemia.  She is never been genetically tested.  We discussed this today.  She also has a daughter who is never been tested and would recommend she be tested as well.  We will change her to rosuvastatin 40 mg a day and check lab on that dose of medication.  Consider placing her on a  PCSK9 inhibitor if her cholesterol cannot be controlled with the high-dose rosuvastatin.  Again, genetic testing advised.    Hypertension: Under good control with current medication.  Return one year.  "

## 2018-12-17 ENCOUNTER — PATIENT MESSAGE (OUTPATIENT)
Dept: MEDICAL GROUP | Facility: MEDICAL CENTER | Age: 63
End: 2018-12-17

## 2018-12-17 RX ORDER — SULFAMETHOXAZOLE AND TRIMETHOPRIM 800; 160 MG/1; MG/1
1 TABLET ORAL 2 TIMES DAILY
Qty: 10 TAB | Refills: 0 | Status: SHIPPED | OUTPATIENT
Start: 2018-12-17 | End: 2018-12-22

## 2018-12-17 NOTE — TELEPHONE ENCOUNTER
From: Joanie Arenas  To: Asa Gordon M.D.  Sent: 12/17/2018 12:09 PM PST  Subject: Prescription Question    Hi Dr. Gordon;    I just saw you last week. This week I'm having issues with a UTI. Sendy had  them in the past so I'm familiar with the symptoms. I was wondering if you  could call in a prescription for me. Sendy been taking cranberry tablets but its not  helping.    Joanie Arenas [birth year]

## 2019-06-19 ENCOUNTER — HOSPITAL ENCOUNTER (OUTPATIENT)
Dept: RADIOLOGY | Facility: MEDICAL CENTER | Age: 64
End: 2019-06-19
Attending: FAMILY MEDICINE
Payer: MEDICARE

## 2019-06-19 DIAGNOSIS — Z12.39 SCREENING BREAST EXAMINATION: ICD-10-CM

## 2019-06-19 PROCEDURE — 77063 BREAST TOMOSYNTHESIS BI: CPT

## 2019-08-05 ENCOUNTER — APPOINTMENT (RX ONLY)
Dept: URBAN - METROPOLITAN AREA CLINIC 4 | Facility: CLINIC | Age: 64
Setting detail: DERMATOLOGY
End: 2019-08-05

## 2019-08-05 DIAGNOSIS — D18.0 HEMANGIOMA: ICD-10-CM

## 2019-08-05 DIAGNOSIS — L91.8 OTHER HYPERTROPHIC DISORDERS OF THE SKIN: ICD-10-CM

## 2019-08-05 DIAGNOSIS — D22 MELANOCYTIC NEVI: ICD-10-CM

## 2019-08-05 DIAGNOSIS — L82.1 OTHER SEBORRHEIC KERATOSIS: ICD-10-CM

## 2019-08-05 DIAGNOSIS — L81.4 OTHER MELANIN HYPERPIGMENTATION: ICD-10-CM

## 2019-08-05 DIAGNOSIS — I10 ESSENTIAL HYPERTENSION, BENIGN: ICD-10-CM

## 2019-08-05 PROBLEM — D18.01 HEMANGIOMA OF SKIN AND SUBCUTANEOUS TISSUE: Status: ACTIVE | Noted: 2019-08-05

## 2019-08-05 PROBLEM — D22.62 MELANOCYTIC NEVI OF LEFT UPPER LIMB, INCLUDING SHOULDER: Status: ACTIVE | Noted: 2019-08-05

## 2019-08-05 PROBLEM — D22.61 MELANOCYTIC NEVI OF RIGHT UPPER LIMB, INCLUDING SHOULDER: Status: ACTIVE | Noted: 2019-08-05

## 2019-08-05 PROBLEM — D22.5 MELANOCYTIC NEVI OF TRUNK: Status: ACTIVE | Noted: 2019-08-05

## 2019-08-05 PROBLEM — D22.71 MELANOCYTIC NEVI OF RIGHT LOWER LIMB, INCLUDING HIP: Status: ACTIVE | Noted: 2019-08-05

## 2019-08-05 PROBLEM — D22.72 MELANOCYTIC NEVI OF LEFT LOWER LIMB, INCLUDING HIP: Status: ACTIVE | Noted: 2019-08-05

## 2019-08-05 PROCEDURE — 99203 OFFICE O/P NEW LOW 30 MIN: CPT

## 2019-08-05 PROCEDURE — ? COUNSELING

## 2019-08-05 PROCEDURE — ? ADDITIONAL NOTES

## 2019-08-05 PROCEDURE — ? SUNSCREEN RECOMMENDATIONS

## 2019-08-05 RX ORDER — LISINOPRIL 40 MG/1
TABLET ORAL
Qty: 90 TAB | Refills: 3 | Status: SHIPPED | OUTPATIENT
Start: 2019-08-05 | End: 2020-10-14

## 2019-08-05 ASSESSMENT — LOCATION SIMPLE DESCRIPTION DERM
LOCATION SIMPLE: RIGHT THIGH
LOCATION SIMPLE: LEFT ANTERIOR NECK
LOCATION SIMPLE: LEFT HAND
LOCATION SIMPLE: LEFT THIGH
LOCATION SIMPLE: LEFT ELBOW
LOCATION SIMPLE: LEFT UPPER BACK
LOCATION SIMPLE: CHEST
LOCATION SIMPLE: UPPER BACK
LOCATION SIMPLE: RIGHT LOWER BACK
LOCATION SIMPLE: LEFT FOREARM
LOCATION SIMPLE: RIGHT HAND
LOCATION SIMPLE: RIGHT FOREARM
LOCATION SIMPLE: LEFT HAND
LOCATION SIMPLE: RIGHT CHEEK
LOCATION SIMPLE: LEFT CHEEK
LOCATION SIMPLE: RIGHT UPPER BACK
LOCATION SIMPLE: LEFT POSTERIOR UPPER ARM
LOCATION SIMPLE: LEFT LOWER BACK
LOCATION SIMPLE: ABDOMEN
LOCATION SIMPLE: CHEST
LOCATION SIMPLE: RIGHT POSTERIOR UPPER ARM
LOCATION SIMPLE: RIGHT MEDIAL SUPERIOR TARSAL REGION

## 2019-08-05 ASSESSMENT — LOCATION ZONE DERM
LOCATION ZONE: NECK
LOCATION ZONE: ARM
LOCATION ZONE: TRUNK
LOCATION ZONE: FACE
LOCATION ZONE: LEG
LOCATION ZONE: HAND
LOCATION ZONE: TRUNK
LOCATION ZONE: EYELID
LOCATION ZONE: HAND

## 2019-08-05 ASSESSMENT — LOCATION DETAILED DESCRIPTION DERM
LOCATION DETAILED: LEFT INFERIOR LATERAL MIDBACK
LOCATION DETAILED: UPPER STERNUM
LOCATION DETAILED: RIGHT INFERIOR MEDIAL UPPER BACK
LOCATION DETAILED: LEFT CENTRAL MALAR CHEEK
LOCATION DETAILED: LEFT ULNAR DORSAL HAND
LOCATION DETAILED: RIGHT PROXIMAL DORSAL FOREARM
LOCATION DETAILED: LEFT PROXIMAL DORSAL FOREARM
LOCATION DETAILED: RIGHT SUPERIOR MEDIAL MIDBACK
LOCATION DETAILED: RIGHT RADIAL DORSAL HAND
LOCATION DETAILED: LEFT RADIAL DORSAL HAND
LOCATION DETAILED: PERIUMBILICAL SKIN
LOCATION DETAILED: LEFT PROXIMAL POSTERIOR UPPER ARM
LOCATION DETAILED: RIGHT MEDIAL SUPERIOR TARSAL REGION
LOCATION DETAILED: LEFT SUPERIOR MEDIAL UPPER BACK
LOCATION DETAILED: UPPER STERNUM
LOCATION DETAILED: LEFT ELBOW
LOCATION DETAILED: RIGHT RIB CAGE
LOCATION DETAILED: LEFT INFERIOR ANTERIOR NECK
LOCATION DETAILED: LEFT ANTERIOR PROXIMAL THIGH
LOCATION DETAILED: RIGHT DISTAL POSTERIOR UPPER ARM
LOCATION DETAILED: RIGHT CENTRAL MALAR CHEEK
LOCATION DETAILED: SUPERIOR THORACIC SPINE
LOCATION DETAILED: RIGHT ANTERIOR PROXIMAL THIGH

## 2019-08-05 NOTE — HPI: FULL BODY SKIN EXAMINATION
How Severe Are Your Spot(S)?: moderate
What Type Of Note Output Would You Prefer (Optional)?: Bullet Format
What Is The Reason For Today's Visit?: Full Body Skin Examination
What Is The Reason For Today's Visit? (Being Monitored For X): the development of a new lesion
Additional History: Full body exam. Skin tag on the left flank drives her crazy.

## 2019-12-01 DIAGNOSIS — E78.49 FAMILIAL HYPERLIPIDEMIA: ICD-10-CM

## 2019-12-02 RX ORDER — ROSUVASTATIN CALCIUM 40 MG/1
TABLET, COATED ORAL
Qty: 90 TAB | Refills: 3 | Status: SHIPPED | OUTPATIENT
Start: 2019-12-02 | End: 2020-12-01 | Stop reason: SDUPTHER

## 2020-01-13 ENCOUNTER — HOSPITAL ENCOUNTER (OUTPATIENT)
Dept: LAB | Facility: MEDICAL CENTER | Age: 65
End: 2020-01-13
Attending: NURSE PRACTITIONER
Payer: MEDICARE

## 2020-01-13 PROCEDURE — 87077 CULTURE AEROBIC IDENTIFY: CPT

## 2020-01-13 PROCEDURE — 87086 URINE CULTURE/COLONY COUNT: CPT

## 2020-01-13 PROCEDURE — 87186 SC STD MICRODIL/AGAR DIL: CPT

## 2020-01-14 LAB
AMBIGUOUS DTTM AMBI4: NORMAL
SIGNIFICANT IND 70042: NORMAL
SITE SITE: NORMAL
SOURCE SOURCE: NORMAL

## 2020-01-16 LAB
BACTERIA UR CULT: ABNORMAL
BACTERIA UR CULT: ABNORMAL
SIGNIFICANT IND 70042: ABNORMAL
SITE SITE: ABNORMAL
SOURCE SOURCE: ABNORMAL

## 2020-05-21 DIAGNOSIS — I10 ESSENTIAL HYPERTENSION, BENIGN: ICD-10-CM

## 2020-05-21 RX ORDER — AMLODIPINE BESYLATE 10 MG/1
TABLET ORAL
Qty: 90 TAB | Refills: 0 | Status: SHIPPED | OUTPATIENT
Start: 2020-05-21 | End: 2020-07-07 | Stop reason: SDUPTHER

## 2020-06-30 ENCOUNTER — PATIENT MESSAGE (OUTPATIENT)
Dept: MEDICAL GROUP | Facility: MEDICAL CENTER | Age: 65
End: 2020-06-30

## 2020-06-30 DIAGNOSIS — R74.8 ELEVATED CPK: ICD-10-CM

## 2020-06-30 DIAGNOSIS — I10 ESSENTIAL HYPERTENSION, BENIGN: ICD-10-CM

## 2020-06-30 DIAGNOSIS — E78.49 FAMILIAL HYPERLIPIDEMIA: ICD-10-CM

## 2020-07-01 ENCOUNTER — HOSPITAL ENCOUNTER (OUTPATIENT)
Dept: LAB | Facility: MEDICAL CENTER | Age: 65
End: 2020-07-01
Attending: FAMILY MEDICINE
Payer: MEDICARE

## 2020-07-01 DIAGNOSIS — E78.49 FAMILIAL HYPERLIPIDEMIA: ICD-10-CM

## 2020-07-01 DIAGNOSIS — I10 ESSENTIAL HYPERTENSION, BENIGN: ICD-10-CM

## 2020-07-01 DIAGNOSIS — R74.8 ELEVATED CPK: ICD-10-CM

## 2020-07-01 LAB
ALBUMIN SERPL BCP-MCNC: 5 G/DL (ref 3.2–4.9)
ALBUMIN/GLOB SERPL: 2.2 G/DL
ALP SERPL-CCNC: 81 U/L (ref 30–99)
ALT SERPL-CCNC: 21 U/L (ref 2–50)
ANION GAP SERPL CALC-SCNC: 15 MMOL/L (ref 7–16)
AST SERPL-CCNC: 26 U/L (ref 12–45)
BASOPHILS # BLD AUTO: 1.4 % (ref 0–1.8)
BASOPHILS # BLD: 0.07 K/UL (ref 0–0.12)
BILIRUB SERPL-MCNC: 0.6 MG/DL (ref 0.1–1.5)
BUN SERPL-MCNC: 13 MG/DL (ref 8–22)
CALCIUM SERPL-MCNC: 9.5 MG/DL (ref 8.5–10.5)
CHLORIDE SERPL-SCNC: 102 MMOL/L (ref 96–112)
CHOLEST SERPL-MCNC: 224 MG/DL (ref 100–199)
CO2 SERPL-SCNC: 22 MMOL/L (ref 20–33)
CREAT SERPL-MCNC: 0.63 MG/DL (ref 0.5–1.4)
EOSINOPHIL # BLD AUTO: 0.08 K/UL (ref 0–0.51)
EOSINOPHIL NFR BLD: 1.6 % (ref 0–6.9)
ERYTHROCYTE [DISTWIDTH] IN BLOOD BY AUTOMATED COUNT: 41.9 FL (ref 35.9–50)
FASTING STATUS PATIENT QL REPORTED: NORMAL
GLOBULIN SER CALC-MCNC: 2.3 G/DL (ref 1.9–3.5)
GLUCOSE SERPL-MCNC: 95 MG/DL (ref 65–99)
HCT VFR BLD AUTO: 45.3 % (ref 37–47)
HDLC SERPL-MCNC: 46 MG/DL
HGB BLD-MCNC: 14.4 G/DL (ref 12–16)
IMM GRANULOCYTES # BLD AUTO: 0 K/UL (ref 0–0.11)
IMM GRANULOCYTES NFR BLD AUTO: 0 % (ref 0–0.9)
LDLC SERPL CALC-MCNC: 145 MG/DL
LYMPHOCYTES # BLD AUTO: 1.99 K/UL (ref 1–4.8)
LYMPHOCYTES NFR BLD: 39 % (ref 22–41)
MCH RBC QN AUTO: 28.9 PG (ref 27–33)
MCHC RBC AUTO-ENTMCNC: 31.8 G/DL (ref 33.6–35)
MCV RBC AUTO: 90.8 FL (ref 81.4–97.8)
MONOCYTES # BLD AUTO: 0.48 K/UL (ref 0–0.85)
MONOCYTES NFR BLD AUTO: 9.4 % (ref 0–13.4)
NEUTROPHILS # BLD AUTO: 2.48 K/UL (ref 2–7.15)
NEUTROPHILS NFR BLD: 48.6 % (ref 44–72)
NRBC # BLD AUTO: 0 K/UL
NRBC BLD-RTO: 0 /100 WBC
PLATELET # BLD AUTO: 282 K/UL (ref 164–446)
PMV BLD AUTO: 10.2 FL (ref 9–12.9)
POTASSIUM SERPL-SCNC: 4 MMOL/L (ref 3.6–5.5)
PROT SERPL-MCNC: 7.3 G/DL (ref 6–8.2)
RBC # BLD AUTO: 4.99 M/UL (ref 4.2–5.4)
SODIUM SERPL-SCNC: 139 MMOL/L (ref 135–145)
TRIGL SERPL-MCNC: 167 MG/DL (ref 0–149)
WBC # BLD AUTO: 5.1 K/UL (ref 4.8–10.8)

## 2020-07-01 PROCEDURE — 82552 ASSAY OF CPK IN BLOOD: CPT

## 2020-07-01 PROCEDURE — 82550 ASSAY OF CK (CPK): CPT

## 2020-07-01 PROCEDURE — 80061 LIPID PANEL: CPT

## 2020-07-01 PROCEDURE — 80053 COMPREHEN METABOLIC PANEL: CPT

## 2020-07-01 PROCEDURE — 85025 COMPLETE CBC W/AUTO DIFF WBC: CPT

## 2020-07-01 PROCEDURE — 36415 COLL VENOUS BLD VENIPUNCTURE: CPT

## 2020-07-04 LAB
CK BB CFR SERPL ELPH: 0 % (ref 0–0)
CK MACRO1 CFR SERPL: 0 % (ref 0–0)
CK MACRO2 CFR SERPL: 0 % (ref 0–0)
CK MB CFR SERPL ELPH: 0 % (ref 0–4)
CK MM CFR SERPL ELPH: 100 % (ref 96–100)
CK SERPL-CCNC: 407 U/L (ref 20–180)

## 2020-07-07 ENCOUNTER — OFFICE VISIT (OUTPATIENT)
Dept: MEDICAL GROUP | Facility: MEDICAL CENTER | Age: 65
End: 2020-07-07
Payer: MEDICARE

## 2020-07-07 VITALS
HEIGHT: 65 IN | SYSTOLIC BLOOD PRESSURE: 120 MMHG | OXYGEN SATURATION: 95 % | WEIGHT: 184 LBS | BODY MASS INDEX: 30.66 KG/M2 | DIASTOLIC BLOOD PRESSURE: 78 MMHG | TEMPERATURE: 98.3 F | HEART RATE: 75 BPM

## 2020-07-07 DIAGNOSIS — E78.49 FAMILIAL HYPERLIPIDEMIA: ICD-10-CM

## 2020-07-07 DIAGNOSIS — Z23 NEED FOR VACCINATION: ICD-10-CM

## 2020-07-07 DIAGNOSIS — Z00.00 MEDICARE ANNUAL WELLNESS VISIT, INITIAL: ICD-10-CM

## 2020-07-07 DIAGNOSIS — I10 ESSENTIAL HYPERTENSION, BENIGN: ICD-10-CM

## 2020-07-07 DIAGNOSIS — H91.93 BILATERAL HEARING LOSS, UNSPECIFIED HEARING LOSS TYPE: ICD-10-CM

## 2020-07-07 DIAGNOSIS — R74.8 ELEVATED CPK: ICD-10-CM

## 2020-07-07 PROCEDURE — 90732 PPSV23 VACC 2 YRS+ SUBQ/IM: CPT | Performed by: FAMILY MEDICINE

## 2020-07-07 PROCEDURE — G0009 ADMIN PNEUMOCOCCAL VACCINE: HCPCS | Performed by: FAMILY MEDICINE

## 2020-07-07 PROCEDURE — G0438 PPPS, INITIAL VISIT: HCPCS | Performed by: FAMILY MEDICINE

## 2020-07-07 RX ORDER — AMLODIPINE BESYLATE 5 MG/1
5 TABLET ORAL
Qty: 90 TAB | Refills: 3 | Status: SHIPPED | OUTPATIENT
Start: 2020-07-07 | End: 2021-06-10 | Stop reason: SDUPTHER

## 2020-07-07 SDOH — HEALTH STABILITY: MENTAL HEALTH: HOW MANY STANDARD DRINKS CONTAINING ALCOHOL DO YOU HAVE ON A TYPICAL DAY?: 1 OR 2

## 2020-07-07 SDOH — HEALTH STABILITY: MENTAL HEALTH: HOW OFTEN DO YOU HAVE 6 OR MORE DRINKS ON ONE OCCASION?: NEVER

## 2020-07-07 SDOH — HEALTH STABILITY: MENTAL HEALTH: HOW OFTEN DO YOU HAVE A DRINK CONTAINING ALCOHOL?: 2-3 TIMES A WEEK

## 2020-07-07 ASSESSMENT — FIBROSIS 4 INDEX: FIB4 SCORE: 1.35

## 2020-07-07 ASSESSMENT — ACTIVITIES OF DAILY LIVING (ADL): BATHING_REQUIRES_ASSISTANCE: 0

## 2020-07-07 ASSESSMENT — PATIENT HEALTH QUESTIONNAIRE - PHQ9: CLINICAL INTERPRETATION OF PHQ2 SCORE: 0

## 2020-07-07 ASSESSMENT — ENCOUNTER SYMPTOMS: GENERAL WELL-BEING: GOOD

## 2020-07-07 NOTE — PROGRESS NOTES
Chief Complaint   Patient presents with   • Annual Wellness Visit         HPI:  Joanie Arenas is a 65 y.o. here for Medicare Annual Wellness Visit     Patient Active Problem List    Diagnosis Date Noted   • Chronic right shoulder pain 05/10/2017   • Digital mucous cyst of toe of right foot 05/10/2017   • Elevated CPK 05/11/2016   • Bilateral hearing loss 12/29/2015   • Pain with urination 12/01/2015   • Muscle cramps 07/01/2015   • Primary osteoarthritis of both knees 07/01/2015   • Left foot pain 06/18/2014   • Spinal stenosis, lumbar region, without neurogenic claudication 01/29/2014   • Family history of early CAD 12/16/2013   • Chronic lower back pain 12/16/2013   • Vitamin D insufficiency 12/16/2013   • Essential hypertension, benign 07/13/2012   • Familial hyperlipidemia 07/13/2012   • Other chest pain 07/13/2012   • Undiagnosed cardiac murmurs 07/13/2012   • Abnormal stress ECG with treadmill 07/13/2012       Current Outpatient Medications   Medication Sig Dispense Refill   • amLODIPine (NORVASC) 5 MG Tab Take 1 Tab by mouth every day. 90 Tab 3   • rosuvastatin (CRESTOR) 40 MG tablet TAKE 1 TABLET BY MOUTH EVERY DAY 90 Tab 3   • lisinopril (PRINIVIL, ZESTRIL) 40 MG tablet TAKE 1 TABLET BY MOUTH EVERY DAY 90 Tab 3   • nitrofurantoin (MACRODANTIN) 50 MG Cap Take 50 mg by mouth 4 times a day.     • vitamin B comp+C (ALLBEE WITH C) Tab Take 1 Tab by mouth every day.     • ascorbic acid (ASCORBIC ACID) 500 MG Tab Take 500 mg by mouth every day.     • Cranberry 125 MG Tab Take  by mouth.     • aspirin (ASA) 81 MG Chew Tab chewable tablet Take 81 mg by mouth every day.     • vitamin D (CHOLECALCIFEROL) 1000 UNIT Tab Take 1,000 Units by mouth every day.     • Omega-3 Fatty Acids (SALMON OIL-1000 PO) Take  by mouth.     • Glucosamine-Chondroit-Vit C-Mn (GLUCOSAMINE 1500 COMPLEX PO) Take  by mouth.     • CHONDROITIN SULFATE PO Take  by mouth.     • ibuprofen (MOTRIN) 200 MG Tab Take 200 mg by mouth every 6  hours as needed.     • Magnesium 300 MG Cap Take  by mouth.       No current facility-administered medications for this visit.             Current supplements as per medication list.       Allergies: Patient has no known allergies.    Current social contact/activities: patient reports she spends time with her children and grandchildren often, and spends time doing outdoor activities with friends     She  reports that she has never smoked. She has never used smokeless tobacco. She reports current alcohol use of about 0.5 oz of alcohol per week. She reports that she does not use drugs.  Counseling given: Not Answered      DPA/Advanced Directive:  Patient has Advanced Directive on file.     ROS:    Gait: Uses no assistive device  Ostomy: No  Other tubes: No  Amputations: No  Chronic oxygen use: No  Last eye exam: Patient reports February 2020  Wears hearing aids: No   : Denies any urinary leakage during the last 6 months      Depression Screening    Little interest or pleasure in doing things?  0 - not at all  Feeling down, depressed , or hopeless? 0 - not at all  Patient Health Questionnaire Score: 0     If depressive symptoms identified deferred to follow up visit unless specifically addressed in assessment and plan.    Interpretation of PHQ-9 Total Score   Score Severity   1-4 No Depression   5-9 Mild Depression   10-14 Moderate Depression   15-19 Moderately Severe Depression   20-27 Severe Depression    Screening for Cognitive Impairment    Three Minute Recall (river, nation, finger) 3/3    Duncan clock face with all 12 numbers and set the hands to show 10 past 11.  Yes 5/5  Cognitive concerns identified deferred for follow up unless specifically addressed in assessment and plan.    Fall Risk Assessment    Has the patient had two or more falls in the last year or any fall with injury in the last year?  No    Safety Assessment    Throw rugs on floor.  Yes  Handrails on all stairs.  Yes  Good lighting in all  hallways.  Yes  Difficulty hearing.  Yes  Patient counseled about all safety risks that were identified.    Functional Assessment ADLs    Are there any barriers preventing you from cooking for yourself or meeting nutritional needs?  No.    Are there any barriers preventing you from driving safely or obtaining transportation?  No.    Are there any barriers preventing you from using a telephone or calling for help?  No.    Are there any barriers preventing you from shopping?  No.    Are there any barriers preventing you from taking care of your own finances?  No.    Are there any barriers preventing you from managing your medications?  No.    Are there any barriers preventing you from showering, bathing or dressing yourself?  No.    Are you currently engaging in any exercise or physical activity?  No.     What is your perception of your health?  Good.      Health Maintenance Summary                Annual Wellness Visit Overdue 1955     HEPATITIS C SCREENING Overdue 1955     IMM ZOSTER VACCINES Overdue 11/19/2013      Done 9/24/2013 Imm Admin: Zoster Vaccine Live (ZVL) (Zostavax)    IMM PNEUMOCOCCAL VACCINE: 65+ Years Overdue 7/6/2019      Done 7/6/2018 Imm Admin: Pneumococcal Conjugate Vaccine (Prevnar/PCV-13)     Patient has more history with this topic...    MAMMOGRAM Overdue 6/19/2020      Done 6/19/2019 MA-SCREENING MAMMO BILAT W/TOMOSYNTHESIS W/CAD     Patient has more history with this topic...    IMM INFLUENZA Next Due 9/1/2020      Done 9/28/2019 Ext Imm: Influenza, High Dose     Patient has more history with this topic...    BONE DENSITY Next Due 8/8/2023      Done 8/8/2018 DS-BONE DENSITY STUDY (DEXA)    COLONOSCOPY Next Due 12/9/2023      Done 12/9/2013 AMB REFERRAL TO GI FOR COLONOSCOPY    IMM DTaP/Tdap/Td Vaccine Next Due 7/1/2025      Done 7/1/2015 Imm Admin: Tdap Vaccine     Patient has more history with this topic...          Patient Care Team:  Asa Gordon M.D. as PCP - General (Family  "Medicine)        Social History     Tobacco Use   • Smoking status: Never Smoker   • Smokeless tobacco: Never Used   Substance Use Topics   • Alcohol use: Yes     Alcohol/week: 0.5 oz     Types: 1 Cans of beer per week     Frequency: 2-3 times a week     Drinks per session: 1 or 2     Binge frequency: Never     Comment: 2 per week   • Drug use: No     Family History   Problem Relation Age of Onset   • Heart Disease Father          from MI at age 32   • Hyperlipidemia Father    • GI Disease Mother         GERD   • Hypertension Mother    • Hyperlipidemia Daughter    • Hyperlipidemia Grandchild      She  has a past medical history of Arthritis (), Benign hypertension (), Cold (), Hypercholesteremia, and Pain ().   Past Surgical History:   Procedure Laterality Date   • LUMBAR FUSION POSTERIOR  2014    Performed by Maci Babb M.D. at SURGERY [facility]   • LUMBAR LAMINECTOMY DISKECTOMY  2014    Performed by Maci Babb M.D. at SURGERY [facility]   • LUMBAR DECOMPRESSION  2014    Performed by Maci Babb M.D. at SURGERY [facility]   • HYSTERECTOMY, TOTAL ABDOMINAL     • TONSILLECTOMY         Exam:   /78 (BP Location: Right arm, Patient Position: Sitting, BP Cuff Size: Adult)   Pulse 75   Temp 36.8 °C (98.3 °F) (Temporal)   Ht 1.651 m (5' 5\")   Wt 83.5 kg (184 lb)   SpO2 95%  Body mass index is 30.62 kg/m².    Constitutional: Alert, no distress.  Skin: Warm, dry, good turgor, no rashes in visible areas.  Eye: Equal, round and reactive, conjunctiva clear, lids normal.  Psych: Alert and oriented x3, normal affect and mood.      Assessment and Plan. The following treatment and monitoring plan is recommended:    1. Medicare annual wellness visit, initial  - Initial Annual Wellness Visit - Includes PPPS ()    2. Essential hypertension, benign  Controlled with home blood pressures being less than 120 over less than 80.  Patient is having bilateral " lower extremity edema, slightly worse on left than right.  She is on 10 mg lisinopril 40 mg daily.  We will decrease amlodipine from 10 mg a day to 5 mg daily to see if this helps with edema in lower extremities.  - amLODIPine (NORVASC) 5 MG Tab; Take 1 Tab by mouth every day.  Dispense: 90 Tab; Refill: 3  - Initial Annual Wellness Visit - Includes PPPS ()    3. Familial hyperlipidemia  Patient is tolerating Crestor 40 mg daily.  - Initial Annual Wellness Visit - Includes PPPS ()    4. Elevated CPK  CPK elevated, similar to previous years.  She has seen neurologist in 2016, who determined CPK elevation was benign.  She does not have any concerning isoenzymes elevations.  - Initial Annual Wellness Visit - Includes PPPS ()    5. Bilateral hearing loss, unspecified hearing loss type  Referral to audiology placed.  - REFERRAL TO AUDIOLOGY  - Initial Annual Wellness Visit - Includes PPPS ()    6. Need for vaccination  - Pneumococal Polysaccharide Vaccine 23-Valent =>3YO SQ/IM  - Initial Annual Wellness Visit - Includes PPPS ()        Services suggested: No services needed at this time  Health Care Screening: Age-appropriate preventive services recommended by USPTF and ACIP covered by Medicare were discussed today. Services ordered if indicated and agreed upon by the patient.  Referrals offered: Community-based lifestyle interventions to reduce health risks and promote self-management and wellness, fall prevention, nutrition, physical activity, tobacco-use cessation, weight loss, and mental health services as per orders if indicated.    Discussion today about general wellness and lifestyle habits:    · Prevent falls and reduce trip hazards; Cautioned about securing or removing rugs.  · Have a working fire alarm and carbon monoxide detector;   · Engage in regular physical activity and social activities     Follow-up: Return in about 1 year (around 7/7/2021) for Annual.

## 2020-10-12 ENCOUNTER — APPOINTMENT (RX ONLY)
Dept: URBAN - METROPOLITAN AREA CLINIC 4 | Facility: CLINIC | Age: 65
Setting detail: DERMATOLOGY
End: 2020-10-12

## 2020-10-12 DIAGNOSIS — Z71.89 OTHER SPECIFIED COUNSELING: ICD-10-CM

## 2020-10-12 DIAGNOSIS — L82.0 INFLAMED SEBORRHEIC KERATOSIS: ICD-10-CM

## 2020-10-12 DIAGNOSIS — D18.0 HEMANGIOMA: ICD-10-CM

## 2020-10-12 DIAGNOSIS — L81.4 OTHER MELANIN HYPERPIGMENTATION: ICD-10-CM

## 2020-10-12 DIAGNOSIS — L91.8 OTHER HYPERTROPHIC DISORDERS OF THE SKIN: ICD-10-CM

## 2020-10-12 DIAGNOSIS — L82.1 OTHER SEBORRHEIC KERATOSIS: ICD-10-CM

## 2020-10-12 DIAGNOSIS — D22 MELANOCYTIC NEVI: ICD-10-CM

## 2020-10-12 PROBLEM — D22.9 MELANOCYTIC NEVI, UNSPECIFIED: Status: ACTIVE | Noted: 2020-10-12

## 2020-10-12 PROBLEM — D18.01 HEMANGIOMA OF SKIN AND SUBCUTANEOUS TISSUE: Status: ACTIVE | Noted: 2020-10-12

## 2020-10-12 PROCEDURE — ? LIQUID NITROGEN (COSMETIC)

## 2020-10-12 PROCEDURE — 99214 OFFICE O/P EST MOD 30 MIN: CPT

## 2020-10-12 PROCEDURE — ? BENIGN DESTRUCTION COSMETIC

## 2020-10-12 PROCEDURE — ? COUNSELING

## 2020-10-12 PROCEDURE — ? SUNSCREEN RECOMMENDATIONS

## 2020-10-12 ASSESSMENT — LOCATION DETAILED DESCRIPTION DERM
LOCATION DETAILED: LEFT INGUINAL CREASE
LOCATION DETAILED: LEFT LATERAL ABDOMEN
LOCATION DETAILED: RIGHT MID TEMPLE
LOCATION DETAILED: RIGHT DISTAL RADIAL DORSAL FOREARM
LOCATION DETAILED: RIGHT SUPERIOR CENTRAL MALAR CHEEK
LOCATION DETAILED: RIGHT LATERAL INFERIOR EYELID
LOCATION DETAILED: RIGHT LATERAL SUPERIOR TARSAL REGION
LOCATION DETAILED: RIGHT MEDIAL SUPERIOR TARSAL REGION
LOCATION DETAILED: RIGHT CENTRAL ZYGOMA

## 2020-10-12 ASSESSMENT — LOCATION SIMPLE DESCRIPTION DERM
LOCATION SIMPLE: RIGHT INFERIOR EYELID
LOCATION SIMPLE: RIGHT CHEEK
LOCATION SIMPLE: RIGHT FOREARM
LOCATION SIMPLE: RIGHT MEDIAL SUPERIOR TARSAL REGION
LOCATION SIMPLE: ABDOMEN
LOCATION SIMPLE: RIGHT ZYGOMA
LOCATION SIMPLE: RIGHT LATERAL SUPERIOR TARSAL REGION
LOCATION SIMPLE: RIGHT TEMPLE
LOCATION SIMPLE: GROIN

## 2020-10-12 ASSESSMENT — LOCATION ZONE DERM
LOCATION ZONE: EYELID
LOCATION ZONE: ARM
LOCATION ZONE: FACE
LOCATION ZONE: TRUNK

## 2020-10-12 NOTE — PROCEDURE: LIQUID NITROGEN (COSMETIC)
Detail Level: Detailed
Billing Information: Bill by Static [employer]
Consent: The patient's consent was obtained including but not limited to risks of crusting, scabbing, blistering, scarring, darker or lighter pigmentary change, recurrence, incomplete removal and infection. The patient understands that the procedure is cosmetic in nature and is not covered by insurance.
Render Post-Care Instructions In Note?: no
Post-Care Instructions: I reviewed with the patient in detail post-care instructions. Patient is to wear sunprotection, and avoid picking at any of the treated lesions. Pt may apply Vaseline to crusted or scabbing areas.

## 2020-10-14 DIAGNOSIS — I10 ESSENTIAL HYPERTENSION, BENIGN: ICD-10-CM

## 2020-10-14 RX ORDER — LISINOPRIL 40 MG/1
TABLET ORAL
Qty: 90 TAB | Refills: 3 | Status: SHIPPED | OUTPATIENT
Start: 2020-10-14 | End: 2021-06-10 | Stop reason: SDUPTHER

## 2020-12-01 ENCOUNTER — PATIENT MESSAGE (OUTPATIENT)
Dept: MEDICAL GROUP | Facility: MEDICAL CENTER | Age: 65
End: 2020-12-01

## 2020-12-01 DIAGNOSIS — E78.49 FAMILIAL HYPERLIPIDEMIA: ICD-10-CM

## 2020-12-01 RX ORDER — ROSUVASTATIN CALCIUM 40 MG/1
40 TABLET, COATED ORAL
Qty: 90 TAB | Refills: 3 | Status: SHIPPED | OUTPATIENT
Start: 2020-12-01 | End: 2021-06-10 | Stop reason: SDUPTHER

## 2020-12-01 NOTE — TELEPHONE ENCOUNTER
From: Joanie Arenas  To: Asa Gordon M.D.  Sent: 2020 2:15 PM PST  Subject: Non-Urgent Medical Question    ;      Sorry to hear that you'll be leaving [facility].  wondering if Dr. Laguerre (sp) who I believe is in your office is accepting new patients?    Also before you leave could you renew my prescription for:  Rosuvastatin 40 mg.?? My pharmacy needs authorization to keep filling it.  my pharmacy is the [facility] located inside [facility].    Joanie Arenas  --53

## 2021-01-04 ENCOUNTER — HOSPITAL ENCOUNTER (OUTPATIENT)
Dept: RADIOLOGY | Facility: MEDICAL CENTER | Age: 66
End: 2021-01-04
Attending: FAMILY MEDICINE
Payer: MEDICARE

## 2021-01-04 DIAGNOSIS — Z12.31 VISIT FOR SCREENING MAMMOGRAM: ICD-10-CM

## 2021-01-04 PROCEDURE — 77063 BREAST TOMOSYNTHESIS BI: CPT

## 2021-03-03 DIAGNOSIS — Z23 NEED FOR VACCINATION: ICD-10-CM

## 2021-06-10 ENCOUNTER — OFFICE VISIT (OUTPATIENT)
Dept: MEDICAL GROUP | Facility: MEDICAL CENTER | Age: 66
End: 2021-06-10
Payer: MEDICARE

## 2021-06-10 VITALS
SYSTOLIC BLOOD PRESSURE: 118 MMHG | RESPIRATION RATE: 16 BRPM | HEART RATE: 88 BPM | TEMPERATURE: 98.5 F | DIASTOLIC BLOOD PRESSURE: 74 MMHG | HEIGHT: 64 IN | OXYGEN SATURATION: 96 % | BODY MASS INDEX: 31.65 KG/M2 | WEIGHT: 185.41 LBS

## 2021-06-10 DIAGNOSIS — Z11.59 NEED FOR HEPATITIS C SCREENING TEST: ICD-10-CM

## 2021-06-10 DIAGNOSIS — E78.49 FAMILIAL HYPERLIPIDEMIA: ICD-10-CM

## 2021-06-10 DIAGNOSIS — R20.2 NUMBNESS AND TINGLING OF RIGHT ARM: ICD-10-CM

## 2021-06-10 DIAGNOSIS — R74.8 ELEVATED CPK: ICD-10-CM

## 2021-06-10 DIAGNOSIS — E66.9 OBESITY (BMI 30-39.9): ICD-10-CM

## 2021-06-10 DIAGNOSIS — R20.0 NUMBNESS AND TINGLING OF RIGHT ARM: ICD-10-CM

## 2021-06-10 DIAGNOSIS — R15.1 FECAL SMEARING: ICD-10-CM

## 2021-06-10 DIAGNOSIS — K21.9 GASTROESOPHAGEAL REFLUX DISEASE WITHOUT ESOPHAGITIS: ICD-10-CM

## 2021-06-10 DIAGNOSIS — I10 ESSENTIAL HYPERTENSION, BENIGN: ICD-10-CM

## 2021-06-10 DIAGNOSIS — R25.2 MUSCLE CRAMPS: ICD-10-CM

## 2021-06-10 PROCEDURE — 99214 OFFICE O/P EST MOD 30 MIN: CPT | Performed by: INTERNAL MEDICINE

## 2021-06-10 RX ORDER — LISINOPRIL 40 MG/1
40 TABLET ORAL
Qty: 90 TABLET | Refills: 3 | Status: SHIPPED | OUTPATIENT
Start: 2021-06-10 | End: 2022-03-15 | Stop reason: SDUPTHER

## 2021-06-10 RX ORDER — ROSUVASTATIN CALCIUM 40 MG/1
40 TABLET, COATED ORAL
Qty: 90 TABLET | Refills: 3 | Status: SHIPPED | OUTPATIENT
Start: 2021-06-10 | End: 2021-07-02

## 2021-06-10 RX ORDER — AMLODIPINE BESYLATE 5 MG/1
5 TABLET ORAL
Qty: 90 TABLET | Refills: 3 | Status: SHIPPED | OUTPATIENT
Start: 2021-06-10 | End: 2022-03-15 | Stop reason: SDUPTHER

## 2021-06-10 ASSESSMENT — FIBROSIS 4 INDEX: FIB4 SCORE: 1.37

## 2021-06-10 ASSESSMENT — PATIENT HEALTH QUESTIONNAIRE - PHQ9: CLINICAL INTERPRETATION OF PHQ2 SCORE: 0

## 2021-06-10 NOTE — ASSESSMENT & PLAN NOTE
Chronic hx of muscle cramps, in calf and feet once to twice a week  Hx of elevated CPK  She tried off atorvastatin which made no difference

## 2021-06-10 NOTE — PROGRESS NOTES
New Patient    Joanie Arenas is a 66 y.o. female who presents today with the following:    CC:   Chief Complaint   Patient presents with   • Establish Care     right forearm numbness X6 weeks   • GI Problem     bowel issues        HPI:     Numbness and tingling of right arm  Numbness of right forearm since early May 2021  Woke up one day started to feel pain and numbness. The pain resolved, with leftover numbness      Muscle cramps  Chronic hx of muscle cramps, in calf and feet once to twice a week  Hx of elevated CPK  She tried off atorvastatin which made no difference      Fecal smearing  Noticed fecal smearing after having bowel movement since Feb 2021      Gastroesophageal reflux disease without esophagitis  Ongoing heartburn          Current Outpatient Medications   Medication Sig Dispense Refill   • lisinopril (PRINIVIL) 40 MG tablet Take 1 tablet by mouth every day. 90 tablet 3   • amLODIPine (NORVASC) 5 MG Tab Take 1 tablet by mouth every day. 90 tablet 3   • rosuvastatin (CRESTOR) 40 MG tablet Take 1 tablet by mouth every day. 90 tablet 3   • nitrofurantoin (MACRODANTIN) 50 MG Cap Take 50 mg by mouth 4 times a day.     • vitamin B comp+C (ALLBEE WITH C) Tab Take 1 Tab by mouth every day.     • ascorbic acid (ASCORBIC ACID) 500 MG Tab Take 500 mg by mouth every day.     • vitamin D (CHOLECALCIFEROL) 1000 UNIT Tab Take 1,000 Units by mouth every day.     • ibuprofen (MOTRIN) 200 MG Tab Take 200 mg by mouth every 6 hours as needed.       No current facility-administered medications for this visit.       Allergies, past medical history, past surgical history, medications, family history, social history reviewed and updated.    ROS   Constitutional: Denies fevers or chills  Eyes: Denies changes in vision  Ears/Nose/Throat/Mouth: Denies nasal congestion or sore throat   Cardiovascular: Denies chest pain or palpitations   Respiratory: Denies shortness of breath , Denies cough  Gastrointestinal/Hepatic:  "Denies abd pain, nausea, vomiting   Genitourinary: Denies dysuria or frequency  Musculoskeletal/Rheum: muscle cramps  Neurological: right forearm numbness Denies headache  Psychiatric: mood stable  Endocrine: Denies hx of diabetes or thyroid dysfunction  Heme/Oncology/Lymph Nodes: Denies weight changes or enlarged LNs.    Physical Exam  Vitals: /74 (BP Location: Left arm, Patient Position: Sitting, BP Cuff Size: Adult)   Pulse 88   Temp 36.9 °C (98.5 °F) (Temporal)   Resp 16   Ht 1.63 m (5' 4.17\")   Wt 84.1 kg (185 lb 6.5 oz)   SpO2 96%   BMI 31.65 kg/m²   General: Alert, pleasant, NAD  HEENT: Normocephalic.  EOMI, no icterus or pallor.  Conjunctivae and lids normal. External ears normal. Wearing a mask. Oropharynx non-erythematous, mucous membranes moist.  Neck supple.  No thyromegaly or masses palpated.   Lymph: No cervical or supraclavicular lymphadenopathy.  Cardiovascular: Regular rate and rhythm.    Respiratory: Normal respiratory effort.  Clear to auscultation bilaterally.  Abdomen: Non-distended, soft, non-tender  Skin: Warm, dry  Musculoskeletal: Gait is normal.  Moves all extremities well.  Extremities: No leg edema.  Radial pulses 2+ symmetric.   Psych:  Affect/mood is normal, judgement is good, memory is intact, grooming is appropriate.        Assessment and Plan    1. Fecal smearing  - REFERRAL TO GASTROENTEROLOGY  Recommend adequate hydration, high fiber diet, dietary fibers, squatty potty.   Kegel exercises    2. Gastroesophageal reflux disease without esophagitis  Avoid triggers  Stay upright for during eating and for 3 hours after eating  Elevated head of bed   Trial of Pepcid, if does not help omeprazole    3. Numbness and tingling of right arm  - REFERRAL TO NEUROLOGY    4. Muscle cramps  - REFERRAL TO NEUROLOGY    5. Familial hyperlipidemia  - Lipid Profile; Future  - TSH WITH REFLEX TO FT4; Future  - REFERRAL TO CARDIOLOGY  - rosuvastatin (CRESTOR) 40 MG tablet; Take 1 tablet by " mouth every day.  Dispense: 90 tablet; Refill: 3    6. Essential hypertension, benign  - CBC WITH DIFFERENTIAL; Future  - Comp Metabolic Panel; Future  - lisinopril (PRINIVIL) 40 MG tablet; Take 1 tablet by mouth every day.  Dispense: 90 tablet; Refill: 3  - amLODIPine (NORVASC) 5 MG Tab; Take 1 tablet by mouth every day.  Dispense: 90 tablet; Refill: 3    7. Elevated CPK  - CK ISOENZYMES SERUM; Future  - Comp Metabolic Panel; Future  - URINALYSIS,CULTURE IF INDICATED; Future    8. Obesity (BMI 30-39.9)  - Patient identified as having weight management issue.  Appropriate orders and counseling given.    9. Need for hepatitis C screening test  - HEP C VIRUS ANTIBODY; Future    Preventive care    Blood pressure: controlled  Lipid panel (>35M, >45F q5yr): ordered  Colon cancer screening (50-75 yr q10 yr): completed  Diabetes screening Blood glucose (BP >135/80): ordered  Vit D (>65): she is taking  Hep C (6148-8884, IVDU, any transfusion before 7/1992): ordered  Flu vaccine: completed  TDAP: completed  PPSV 23: completed  Prevnar 13: completed  Zoster(>=60): pending    Mammogram (>40yr q1-2yr): ordered  Pap smear (21-30 q3yr, 30-65, q5yr if do HPV): hysterectomy  DEXA/Osteoporosis (>65 or <65 if FRAX >9.3% or hip>3%): osteopenia  calcium 1200 mg daily from all sources, vitamin D supplement      Patient Counseling:  --Discussed Healthful lifestyle measures  --Discussed brushing, flossing, and dental visits.   --Encouraged regular exercise.   --Discussed tobacco, alcohol, or other drug use; availability of treatment for abuse.    --Injury prevention: Discussed safety belts, safety helmets,  etc.        Follow-up:Return in about 2 months (around 8/10/2021), or if symptoms worsen or fail to improve.    This note was created using voice recognition software. There may be unintended errors in spelling, grammar or content.

## 2021-06-10 NOTE — ASSESSMENT & PLAN NOTE
Numbness of right forearm since early May 2021  Woke up one day started to feel pain and numbness. The pain resolved, with leftover numbness

## 2021-06-25 ENCOUNTER — HOSPITAL ENCOUNTER (OUTPATIENT)
Dept: LAB | Facility: MEDICAL CENTER | Age: 66
End: 2021-06-25
Attending: INTERNAL MEDICINE
Payer: MEDICARE

## 2021-06-25 DIAGNOSIS — R74.8 ELEVATED CPK: ICD-10-CM

## 2021-06-25 DIAGNOSIS — E78.49 FAMILIAL HYPERLIPIDEMIA: ICD-10-CM

## 2021-06-25 DIAGNOSIS — I10 ESSENTIAL HYPERTENSION, BENIGN: ICD-10-CM

## 2021-06-25 DIAGNOSIS — Z11.59 NEED FOR HEPATITIS C SCREENING TEST: ICD-10-CM

## 2021-06-25 LAB
ALBUMIN SERPL BCP-MCNC: 4.5 G/DL (ref 3.2–4.9)
ALBUMIN/GLOB SERPL: 1.7 G/DL
ALP SERPL-CCNC: 80 U/L (ref 30–99)
ALT SERPL-CCNC: 20 U/L (ref 2–50)
ANION GAP SERPL CALC-SCNC: 8 MMOL/L (ref 7–16)
APPEARANCE UR: CLEAR
AST SERPL-CCNC: 23 U/L (ref 12–45)
BACTERIA #/AREA URNS HPF: ABNORMAL /HPF
BASOPHILS # BLD AUTO: 0.7 % (ref 0–1.8)
BASOPHILS # BLD: 0.05 K/UL (ref 0–0.12)
BILIRUB SERPL-MCNC: 0.5 MG/DL (ref 0.1–1.5)
BILIRUB UR QL STRIP.AUTO: NEGATIVE
BUN SERPL-MCNC: 13 MG/DL (ref 8–22)
CALCIUM SERPL-MCNC: 9 MG/DL (ref 8.5–10.5)
CHLORIDE SERPL-SCNC: 104 MMOL/L (ref 96–112)
CHOLEST SERPL-MCNC: 220 MG/DL (ref 100–199)
CO2 SERPL-SCNC: 27 MMOL/L (ref 20–33)
COLOR UR: YELLOW
CREAT SERPL-MCNC: 0.62 MG/DL (ref 0.5–1.4)
EOSINOPHIL # BLD AUTO: 0.08 K/UL (ref 0–0.51)
EOSINOPHIL NFR BLD: 1.2 % (ref 0–6.9)
EPI CELLS #/AREA URNS HPF: ABNORMAL /HPF
ERYTHROCYTE [DISTWIDTH] IN BLOOD BY AUTOMATED COUNT: 45.4 FL (ref 35.9–50)
FASTING STATUS PATIENT QL REPORTED: NORMAL
GLOBULIN SER CALC-MCNC: 2.6 G/DL (ref 1.9–3.5)
GLUCOSE SERPL-MCNC: 95 MG/DL (ref 65–99)
GLUCOSE UR STRIP.AUTO-MCNC: NEGATIVE MG/DL
HCT VFR BLD AUTO: 46.1 % (ref 37–47)
HCV AB SER QL: NORMAL
HDLC SERPL-MCNC: 41 MG/DL
HGB BLD-MCNC: 14.5 G/DL (ref 12–16)
HYALINE CASTS #/AREA URNS LPF: ABNORMAL /LPF
IMM GRANULOCYTES # BLD AUTO: 0.03 K/UL (ref 0–0.11)
IMM GRANULOCYTES NFR BLD AUTO: 0.4 % (ref 0–0.9)
KETONES UR STRIP.AUTO-MCNC: NEGATIVE MG/DL
LDLC SERPL CALC-MCNC: 144 MG/DL
LEUKOCYTE ESTERASE UR QL STRIP.AUTO: ABNORMAL
LYMPHOCYTES # BLD AUTO: 2.6 K/UL (ref 1–4.8)
LYMPHOCYTES NFR BLD: 38.6 % (ref 22–41)
MCH RBC QN AUTO: 28.5 PG (ref 27–33)
MCHC RBC AUTO-ENTMCNC: 31.5 G/DL (ref 33.6–35)
MCV RBC AUTO: 90.7 FL (ref 81.4–97.8)
MICRO URNS: ABNORMAL
MONOCYTES # BLD AUTO: 0.6 K/UL (ref 0–0.85)
MONOCYTES NFR BLD AUTO: 8.9 % (ref 0–13.4)
NEUTROPHILS # BLD AUTO: 3.38 K/UL (ref 2–7.15)
NEUTROPHILS NFR BLD: 50.2 % (ref 44–72)
NITRITE UR QL STRIP.AUTO: NEGATIVE
NRBC # BLD AUTO: 0 K/UL
NRBC BLD-RTO: 0 /100 WBC
PH UR STRIP.AUTO: 7 [PH] (ref 5–8)
PLATELET # BLD AUTO: 268 K/UL (ref 164–446)
PMV BLD AUTO: 10.2 FL (ref 9–12.9)
POTASSIUM SERPL-SCNC: 4 MMOL/L (ref 3.6–5.5)
PROT SERPL-MCNC: 7.1 G/DL (ref 6–8.2)
PROT UR QL STRIP: NEGATIVE MG/DL
RBC # BLD AUTO: 5.08 M/UL (ref 4.2–5.4)
RBC # URNS HPF: ABNORMAL /HPF
RBC UR QL AUTO: NEGATIVE
SODIUM SERPL-SCNC: 139 MMOL/L (ref 135–145)
SP GR UR STRIP.AUTO: 1
TRIGL SERPL-MCNC: 176 MG/DL (ref 0–149)
TSH SERPL DL<=0.005 MIU/L-ACNC: 3.22 UIU/ML (ref 0.38–5.33)
UROBILINOGEN UR STRIP.AUTO-MCNC: 0.2 MG/DL
WBC # BLD AUTO: 6.7 K/UL (ref 4.8–10.8)
WBC #/AREA URNS HPF: ABNORMAL /HPF

## 2021-06-25 PROCEDURE — 80061 LIPID PANEL: CPT

## 2021-06-25 PROCEDURE — 85025 COMPLETE CBC W/AUTO DIFF WBC: CPT

## 2021-06-25 PROCEDURE — 82550 ASSAY OF CK (CPK): CPT

## 2021-06-25 PROCEDURE — 81001 URINALYSIS AUTO W/SCOPE: CPT

## 2021-06-25 PROCEDURE — 80053 COMPREHEN METABOLIC PANEL: CPT

## 2021-06-25 PROCEDURE — 86803 HEPATITIS C AB TEST: CPT

## 2021-06-25 PROCEDURE — 84443 ASSAY THYROID STIM HORMONE: CPT

## 2021-06-25 PROCEDURE — 82552 ASSAY OF CPK IN BLOOD: CPT

## 2021-06-25 PROCEDURE — 36415 COLL VENOUS BLD VENIPUNCTURE: CPT

## 2021-06-30 LAB
CK BB CFR SERPL ELPH: 0 % (ref 0–0)
CK MACRO1 CFR SERPL: 0 % (ref 0–0)
CK MACRO2 CFR SERPL: 0 % (ref 0–0)
CK MB CFR SERPL ELPH: 0 % (ref 0–4)
CK MM CFR SERPL ELPH: 100 % (ref 96–100)
CK SERPL-CCNC: 578 U/L (ref 20–180)

## 2021-07-02 ENCOUNTER — OFFICE VISIT (OUTPATIENT)
Dept: CARDIOLOGY | Facility: MEDICAL CENTER | Age: 66
End: 2021-07-02
Payer: MEDICARE

## 2021-07-02 VITALS
WEIGHT: 178.8 LBS | OXYGEN SATURATION: 97 % | DIASTOLIC BLOOD PRESSURE: 70 MMHG | HEIGHT: 65 IN | SYSTOLIC BLOOD PRESSURE: 118 MMHG | HEART RATE: 86 BPM | RESPIRATION RATE: 16 BRPM | BODY MASS INDEX: 29.79 KG/M2

## 2021-07-02 DIAGNOSIS — E78.49 FAMILIAL HYPERLIPIDEMIA: ICD-10-CM

## 2021-07-02 DIAGNOSIS — I10 ESSENTIAL HYPERTENSION, BENIGN: ICD-10-CM

## 2021-07-02 DIAGNOSIS — R74.8 ELEVATED CPK: ICD-10-CM

## 2021-07-02 DIAGNOSIS — I25.10 CORONARY ARTERY CALCIFICATION SEEN ON COMPUTED TOMOGRAPHY: ICD-10-CM

## 2021-07-02 PROCEDURE — 99215 OFFICE O/P EST HI 40 MIN: CPT | Performed by: INTERNAL MEDICINE

## 2021-07-02 RX ORDER — DIPHENHYDRAMINE HYDROCHLORIDE 25 MG/1
TABLET ORAL
COMMUNITY
End: 2023-05-19

## 2021-07-02 RX ORDER — EZETIMIBE 10 MG/1
10 TABLET ORAL DAILY
Qty: 30 TABLET | Refills: 3 | Status: SHIPPED | OUTPATIENT
Start: 2021-07-02 | End: 2021-07-28

## 2021-07-02 ASSESSMENT — ENCOUNTER SYMPTOMS
CONSTIPATION: 0
WEIGHT LOSS: 0
CLAUDICATION: 0
BACK PAIN: 0
DIARRHEA: 0
FLANK PAIN: 0
PND: 0
COUGH: 0
ABDOMINAL PAIN: 0
DYSPNEA ON EXERTION: 0
DEPRESSION: 0
SHORTNESS OF BREATH: 0
WEIGHT GAIN: 0
IRREGULAR HEARTBEAT: 0
HEARTBURN: 0
MUSCLE CRAMPS: 1
DIZZINESS: 0
PALPITATIONS: 0
DECREASED APPETITE: 0
NAUSEA: 0
BLURRED VISION: 0
ORTHOPNEA: 0
ALTERED MENTAL STATUS: 0
VOMITING: 0
SYNCOPE: 0
FEVER: 0
NEAR-SYNCOPE: 0

## 2021-07-02 ASSESSMENT — FIBROSIS 4 INDEX: FIB4 SCORE: 1.3

## 2021-07-02 NOTE — PATIENT INSTRUCTIONS
Touch base with [facility] [place] for genetic testing re familial hyperlipidemia.       Chronic Venous Insufficiency  Chronic venous insufficiency is a condition where the leg veins cannot effectively pump blood from the legs to the heart. This happens when the vein walls are either stretched, weakened, or damaged, or when the valves inside the vein are damaged. With the right treatment, you should be able to continue with an active life. This condition is also called venous stasis.  What are the causes?  Common causes of this condition include:  · High blood pressure inside the veins (venous hypertension).  · Sitting or standing too long, causing increased blood pressure in the leg veins.  · A blood clot that blocks blood flow in a vein (deep vein thrombosis, DVT).  · Inflammation of a vein (phlebitis) that causes a blood clot to form.  · Tumors in the pelvis that cause blood to back up.  What increases the risk?  The following factors may make you more likely to develop this condition:  · Having a family history of this condition.  · Obesity.  · Pregnancy.  · Living without enough regular physical activity or exercise (sedentary lifestyle).  · Smoking.  · Having a job that requires long periods of standing or sitting in one place.  · Being a certain age. Women in their 40s and 50s and men in their 70s are more likely to develop this condition.  What are the signs or symptoms?  Symptoms of this condition include:  · Veins that are enlarged, bulging, or twisted (varicose veins).  · Skin breakdown or ulcers.  · Reddened skin or dark discoloration of skin on the leg between the knee and ankle.  · Brown, smooth, tight, and painful skin just above the ankle, usually on the inside of the leg (lipodermatosclerosis).  · Swelling of the legs.  How is this diagnosed?  This condition may be diagnosed based on:  · Your medical history.  · A physical exam.  · Tests, such as:  ? A procedure that creates an image of a blood vessel  and nearby organs and provides information about blood flow through the blood vessel (duplex ultrasound).  ? A procedure that tests blood flow (plethysmography).  ? A procedure that looks at the veins using X-ray and dye (venogram).  How is this treated?  The goals of treatment are to help you return to an active life and to minimize pain or disability. Treatment depends on the severity of your condition, and it may include:  · Wearing compression stockings. These can help relieve symptoms and help prevent your condition from getting worse. However, they do not cure the condition.  · Sclerotherapy. This procedure involves an injection of a solution that shrinks damaged veins.  · Surgery. This may involve:  ? Removing a diseased vein (vein stripping).  ? Cutting off blood flow through the vein (laser ablation surgery).  ? Repairing or reconstructing a valve within the affected vein.  Follow these instructions at home:         · Wear compression stockings as told by your health care provider. These stockings help to prevent blood clots and reduce swelling in your legs.  · Take over-the-counter and prescription medicines only as told by your health care provider.  · Stay active by exercising, walking, or doing different activities. Ask your health care provider what activities are safe for you and how much exercise you need.  · Drink enough fluid to keep your urine pale yellow.  · Do not use any products that contain nicotine or tobacco, such as cigarettes, e-cigarettes, and chewing tobacco. If you need help quitting, ask your health care provider.  · Keep all follow-up visits as told by your health care provider. This is important.  Contact a health care provider if you:  · Have redness, swelling, or more pain in the affected area.  · See a red streak or line that goes up or down from the affected area.  · Have skin breakdown or skin loss in the affected area, even if the breakdown is small.  · Get an injury in the  affected area.  Get help right away if:  · You get an injury and an open wound in the affected area.  · You have:  ? Severe pain that does not get better with medicine.  ? Sudden numbness or weakness in the foot or ankle below the affected area.  ? Trouble moving your foot or ankle.  ? A fever.  ? Worse or persistent symptoms.  ? Chest pain.  ? Shortness of breath.  Summary  · Chronic venous insufficiency is a condition where the leg veins cannot effectively pump blood from the legs to the heart.  · Chronic venous insufficiency occurs when the vein walls become stretched, weakened, or damaged, or when valves within the vein are damaged.  · Treatment depends on how severe your condition is. It often involves wearing compression stockings and may involve having a procedure.  · Make sure you stay active by exercising, walking, or doing different activities. Ask your health care provider what activities are safe for you and how much exercise you need.  This information is not intended to replace advice given to you by your health care provider. Make sure you discuss any questions you have with your health care provider.  Document Released: 04/22/2008 Document Revised: 09/10/2019 Document Reviewed: 09/10/2019  Cigital Patient Education © 2020 [employer]    Alirocumab injection  What is this medicine?  ALIROCUMAB (al i RAJENDRA ue mab) is known as a PCSK9 inhibitor. It is used to lower the level of cholesterol in the blood. It may be used alone or in combination with other cholesterol-lowering drugs. This drug may also be used to reduce the risk of heart attack, stroke, and certain types of chest pain (unstable angina) that may need hospitalization.  This medicine may be used for other purposes; ask your health care provider or pharmacist if you have questions.  COMMON BRAND NAME(S): Praluent  What should I tell my health care provider before I take this medicine?  They need to know if you have any of these  conditions:  · any unusual or allergic reaction to alirocumab, other medicines, foods, dyes, or preservatives  · pregnant or trying to get pregnant  · breast-feeding  How should I use this medicine?  This medicine is for injection under the skin. You will be taught how to prepare and give this medicine. Use exactly as directed. Take your medicine at regular intervals. Do not take your medicine more often than directed.  It is important that you put your used needles and syringes in a special sharps container. Do not put them in a trash can. If you do not have a sharps container, call your pharmacist or healthcare provider to get one.  Talk to your pediatrician regarding the use of this medicine in children. Special care may be needed.  Overdosage: If you think you have taken too much of this medicine contact a poison control center or emergency room at once.  NOTE: This medicine is only for you. Do not share this medicine with others.  What if I miss a dose?  If you are on an every 2 week schedule and miss a dose, take it as soon as you can. If your next dose is to be taken in less than 7 days, then do not take the missed dose. Take the next dose at your regular time. Do not take double or extra doses. If you are on a monthly schedule and miss a dose, take it as soon as you can. If the dose given is within 7 days of the missed dose, continue with your regular monthly schedule. If the missed dose is administered after 7 days of the original date, administer the dose and start a new monthly schedule based on this date.  What may interact with this medicine?  Interactions are not expected.  This list may not describe all possible interactions. Give your health care provider a list of all the medicines, herbs, non-prescription drugs, or dietary supplements you use. Also tell them if you smoke, drink alcohol, or use illegal drugs. Some items may interact with your medicine.  What should I watch for while using this  medicine?  You may need blood work done while you are taking this medicine.  What side effects may I notice from receiving this medicine?  Side effects that you should report to your doctor or health care professional as soon as possible:  · allergic reactions like skin rash, itching or hives, swelling of the face, lips, or tongue  · signs and symptoms of infection like fever or chills; cough; sore throat; pain or trouble passing urine  · signs and symptoms of liver injury like dark yellow or brown urine; general ill feeling or flu-like symptoms; light-colored stools; loss of appetite; nausea; right upper belly pain; unusually weak or tired; yellowing of the eyes or skin  Side effects that usually do not require medical attention (report to your doctor or health care professional if they continue or are bothersome):  · diarrhea  · muscle cramps  · muscle pain  · pain, redness, or irritation at site where injected  This list may not describe all possible side effects. Call your doctor for medical advice about side effects. You may report side effects to FDA at 2-484-JVU-6891.  Where should I keep my medicine?  Keep out of the reach of children.  You will be instructed on how to store this medicine. Throw away any unused medicine after the expiration date on the label.  NOTE: This sheet is a summary. It may not cover all possible information. If you have questions about this medicine, talk to your doctor, pharmacist, or health care provider.  © 2020 Elsevier/Gold Standard (2019-05-02 22:02:52)

## 2021-07-02 NOTE — PROGRESS NOTES
Cardiology Note    Chief Complaint   Patient presents with   • Hyperlipidemia     Dx: Familial hyperlipidemia   • Hypertension     Dx: Essential hypertension, benign       History of Present Illness: Joanie Arenas is a 66 y.o. female PMH familial HLD ( in 2015), CAC on CT, HTN who presents for     Concerned over elevated CPK in conjunction with her PMD. She has leg cramps especially behind the knees. Also complains of ankle swelling that worsens later in the day and with prolonged standing but improves overnight by the next morning. No other cardiac complaints. No exertional symptoms. Compliant with medications, however, believes cpk and muscle aches related to statin.     Review of Systems   Constitutional: Negative for decreased appetite, fever, malaise/fatigue, weight gain and weight loss.   HENT: Negative for congestion and nosebleeds.    Eyes: Negative for blurred vision.   Cardiovascular: Negative for chest pain, claudication, dyspnea on exertion, irregular heartbeat, leg swelling, near-syncope, orthopnea, palpitations, paroxysmal nocturnal dyspnea and syncope.   Respiratory: Negative for cough and shortness of breath.    Endocrine: Negative for cold intolerance and heat intolerance.   Skin: Negative for rash.   Musculoskeletal: Positive for muscle cramps. Negative for back pain.   Gastrointestinal: Negative for abdominal pain, constipation, diarrhea, heartburn, melena, nausea and vomiting.   Genitourinary: Negative for dysuria, flank pain and hematuria.   Neurological: Negative for dizziness.   Psychiatric/Behavioral: Negative for altered mental status and depression.         Past Medical History:   Diagnosis Date   • Arthritis 2014    hands and back   • Benign hypertension 2014    well controlled on meds pt states increased with anxiety   • Cold 2014 01/20 end of cold symptoms   • Hypercholesteremia    • Pain 2014    6/10         Past Surgical History:   Procedure Laterality Date   • LUMBAR  FUSION POSTERIOR  2014    Performed by Maci Babb M.D. at [facility]   • LUMBAR LAMINECTOMY DISKECTOMY  2014    Performed by Maci Babb M.D. at SUR[facility]   • LUMBAR DECOMPRESSION  2014    Performed by Maci Babb M.D. at SUR[facility]ERY [facility]   • HYSTERECTOMY, TOTAL ABDOMINAL     • TONSILLECTOMY           Current Outpatient Medications   Medication Sig Dispense Refill   • Biotin (HM BIOTIN) 5 MG Cap Take  by mouth.     • coenzyme Q-10 30 MG capsule Take 300 mg by mouth every day.     • Alirocumab 75 MG/ML Solution Auto-injector Inject 75 mg under the skin every 14 days. 2.24 mL 6   • aspirin EC (ECOTRIN) 81 MG Tablet Delayed Response Take 1 tablet by mouth every day. 90 tablet 3   • ezetimibe (ZETIA) 10 MG Tab Take 1 tablet by mouth every day. 30 tablet 3   • lisinopril (PRINIVIL) 40 MG tablet Take 1 tablet by mouth every day. 90 tablet 3   • amLODIPine (NORVASC) 5 MG Tab Take 1 tablet by mouth every day. 90 tablet 3   • nitrofurantoin (MACRODANTIN) 50 MG Cap Take 50 mg by mouth 4 times a day.     • vitamin B comp+C (ALLBEE WITH C) Tab Take 1 Tab by mouth every day.     • ascorbic acid (ASCORBIC ACID) 500 MG Tab Take 500 mg by mouth every day.     • vitamin D (CHOLECALCIFEROL) 1000 UNIT Tab Take 1,000 Units by mouth every day.     • ibuprofen (MOTRIN) 200 MG Tab Take 200 mg by mouth every 6 hours as needed.       No current facility-administered medications for this visit.         No Known Allergies      Family History   Problem Relation Age of Onset   • Heart Disease Father          from MI at age 32   • Hyperlipidemia Father    • GI Disease Mother         GERD   • Hypertension Mother    • Hyperlipidemia Daughter    • Hyperlipidemia Grandchild          Social History     Socioeconomic History   • Marital status:      Spouse name: Not on file   • Number of children: Not on file   • Years of education: Not on file   • Highest education level: Not  "on file   Occupational History   • Not on file   Tobacco Use   • Smoking status: Never Smoker   • Smokeless tobacco: Never Used   Vaping Use   • Vaping Use: Never used   Substance and Sexual Activity   • Alcohol use: Yes     Alcohol/week: 1.8 oz     Types: 3 Cans of beer per week     Comment: 2-3 per week   • Drug use: No   • Sexual activity: Yes     Partners: Male   Other Topics Concern   • Not on file   Social History Narrative   • Not on file     Social Determinants of Health     Financial Resource Strain:    • Difficulty of Paying Living Expenses:    Food Insecurity:    • Worried About Running Out of Food in the Last Year:    • Ran Out of Food in the Last Year:    Transportation Needs:    • Lack of Transportation (Medical):    • Lack of Transportation (Non-Medical):    Physical Activity:    • Days of Exercise per Week:    • Minutes of Exercise per Session:    Stress:    • Feeling of Stress :    Social Connections:    • Frequency of Communication with Friends and Family:    • Frequency of Social Gatherings with Friends and Family:    • Attends Islam Services:    • Active Member of Clubs or Organizations:    • Attends Club or Organization Meetings:    • Marital Status:    Intimate Partner Violence:    • Fear of Current or Ex-Partner:    • Emotionally Abused:    • Physically Abused:    • Sexually Abused:          Physical Exam:  Ambulatory Vitals  /70 (BP Location: Left arm, Patient Position: Sitting, BP Cuff Size: Adult)   Pulse 86   Resp 16   Ht 1.651 m (5' 5\")   Wt 81.1 kg (178 lb 12.8 oz)   SpO2 97%    BP Readings from Last 4 Encounters:   07/02/21 118/70   06/10/21 118/74   07/07/20 120/78   12/12/18 108/70     Weight/BMI:   Vitals:    07/02/21 1411   BP: 118/70   Weight: 81.1 kg (178 lb 12.8 oz)   Height: 1.651 m (5' 5\")    Body mass index is 29.75 kg/m².  Wt Readings from Last 4 Encounters:   07/02/21 81.1 kg (178 lb 12.8 oz)   06/10/21 84.1 kg (185 lb 6.5 oz)   07/07/20 83.5 kg (184 lb) "   12/12/18 66.2 kg (146 lb)       Physical Exam  Constitutional:       General: She is not in acute distress.  HENT:      Head: Normocephalic and atraumatic.   Eyes:      Conjunctiva/sclera: Conjunctivae normal.      Pupils: Pupils are equal, round, and reactive to light.   Neck:      Vascular: No JVD.   Cardiovascular:      Rate and Rhythm: Normal rate and regular rhythm.      Heart sounds: Normal heart sounds. No murmur heard.   No friction rub. No gallop.    Pulmonary:      Effort: Pulmonary effort is normal. No respiratory distress.      Breath sounds: Normal breath sounds. No wheezing or rales.   Chest:      Chest wall: No tenderness.   Abdominal:      General: Bowel sounds are normal. There is no distension.      Palpations: Abdomen is soft.   Musculoskeletal:      Cervical back: Normal range of motion and neck supple.   Skin:     General: Skin is warm and dry.   Neurological:      Mental Status: She is alert and oriented to person, place, and time.   Psychiatric:         Mood and Affect: Affect normal.         Judgment: Judgment normal.         Lab Data Review:  Lab Results   Component Value Date/Time    CHOLSTRLTOT 220 (H) 06/25/2021 07:43 AM     (H) 06/25/2021 07:43 AM    HDL 41 06/25/2021 07:43 AM    TRIGLYCERIDE 176 (H) 06/25/2021 07:43 AM       Lab Results   Component Value Date/Time    SODIUM 139 06/25/2021 07:43 AM    POTASSIUM 4.0 06/25/2021 07:43 AM    CHLORIDE 104 06/25/2021 07:43 AM    CO2 27 06/25/2021 07:43 AM    GLUCOSE 95 06/25/2021 07:43 AM    BUN 13 06/25/2021 07:43 AM    CREATININE 0.62 06/25/2021 07:43 AM    CREATININE 0.7 05/04/2007 10:30 AM    BUNCREATRAT 21 05/08/2017 08:27 AM     CrCl cannot be calculated (Patient's most recent lab result is older than the maximum 7 days allowed.).  Lab Results   Component Value Date/Time    ALKPHOSPHAT 80 06/25/2021 07:43 AM    ASTSGOT 23 06/25/2021 07:43 AM    ALTSGPT 20 06/25/2021 07:43 AM    TBILIRUBIN 0.5 06/25/2021 07:43 AM      Lab Results    Component Value Date/Time    WBC 6.7 06/25/2021 07:43 AM     No results found for: HBA1C  No components found for: TROP      Cardiac Imaging and Procedures Review:      TTE 7/2012  Left Ventricle   Normal left ventricular size and function. Normal left ventricular wall   thickness. Normal left ventricular systolic function. Grade II   diastolic dysfunction is suggested by inflow Doppler and TDI but LA   dimension is normal. Left ventricular ejection fraction is 60% to 65%.   Normal regional wall motion.   CONCLUSIONS   Normal left ventricular size and function.   Very mild aortic valve leaflet thickening without significant stenosis   or regurgitation.   Trace mitral regurgitation.     Stress echo 2012  CONCLUSIONS   Normal stress echocardiogram.     CAC CT 2017  Coronary calcification:  LMA - 0.0  LCX - 0.0  LAD - 17.4  RCA - 9.6  PDA - 0.0  Calcium score:  27.0    Medical Decision Making:  Problem List Items Addressed This Visit     Essential hypertension, benign    Relevant Medications    Alirocumab 75 MG/ML Solution Auto-injector    ezetimibe (ZETIA) 10 MG Tab    Familial hyperlipidemia    Relevant Medications    Alirocumab 75 MG/ML Solution Auto-injector    ezetimibe (ZETIA) 10 MG Tab    Other Relevant Orders    Lipid Profile    CREATINE KINASE    HECTOR COMPREHENSIVE PANEL    REFERRAL TO NUTRITION SERVICES    Elevated CPK    Coronary artery calcification seen on computed tomography    Relevant Medications    Alirocumab 75 MG/ML Solution Auto-injector    ezetimibe (ZETIA) 10 MG Tab        HTN - goal 120/80. Controlled. Continue regimen.    Familial HLD - advised entire family be tested for elevated cholesterol. Patient underwent genetic screening and will contact [facility] [place] as she doesn't know results. Change statin to praluent. zetia for now. Repeat lipids in 6 months. Repeat CPK and HECTOR in two weeks. Optimize hydralation.     CAC on CT - add aspirin. Change statin to pcsk9 inhibitor.     It was my pleasure  to meet with Ms. Arenas.

## 2021-07-07 ENCOUNTER — TELEPHONE (OUTPATIENT)
Dept: CARDIOLOGY | Facility: MEDICAL CENTER | Age: 66
End: 2021-07-07

## 2021-07-07 NOTE — TELEPHONE ENCOUNTER
----- Message from Nikhil Meeks M.D. sent at 7/2/2021  2:45 PM PDT -----  Madison Reed,  Can I get your help with prior authorization on praluent for this patient?   Thank you!

## 2021-07-07 NOTE — TELEPHONE ENCOUNTER
Note: [employer]'s covered (formulary) drugs are Repatha SureClick subcutaneous pen injector, Repatha Syringe subcutaneous syringe, atorvastatin tablet, rosuvastatin tablet, and ezetimibe tablet.

## 2021-07-07 NOTE — TELEPHONE ENCOUNTER
REN SHRESTHA Key: [id number] - PA Case ID: [id number] help? Call us at [phone]  Status  Sent to [employer]  Drug  Praluent 75MG/ML auto-injectors  Form  [employer] Electronic PA Form

## 2021-07-09 RX ORDER — EVOLOCUMAB 140 MG/ML
1 INJECTION, SOLUTION SUBCUTANEOUS
Qty: 6 EACH | Refills: 3 | Status: SHIPPED | OUTPATIENT
Start: 2021-07-09 | End: 2022-03-24

## 2021-07-09 NOTE — TELEPHONE ENCOUNTER
You  Nikhil Meeks M.D. 2 days ago     Can we do Repatha instead?      Nikhil Meeks M.D.  You; Brianna Wagner, Med Ass't 9 hours ago (10:52 PM)     Definitely! These are always interchangeable. Repatha 140mg [id number]. Thanks guys!      Repatha Rx sent.

## 2021-07-12 ENCOUNTER — TELEPHONE (OUTPATIENT)
Dept: CARDIOLOGY | Facility: MEDICAL CENTER | Age: 66
End: 2021-07-12

## 2021-07-12 NOTE — TELEPHONE ENCOUNTER
REN SHRESTHA ([place]: [id number])  Repatha SureClick 140MG/ML auto-injectors     Form  [employer] Electronic PA Form  Created  8 minutes ago  Sent to Plan  5 minutes ago  Plan Response  4 minutes ago  Submit Clinical Questions  less than a minute ago  Determination  Favorable  less than a minute ago  Message from Plan  PA Case: [id number], Status: Approved, Coverage Starts on: 7/12/2021 12:00:00 AM, Coverage Ends on: 1/8/2022 12:00:00 AM. Questions? Contact [phone].

## 2021-07-26 ENCOUNTER — OFFICE VISIT (OUTPATIENT)
Dept: NEUROLOGY | Facility: MEDICAL CENTER | Age: 66
End: 2021-07-26
Attending: PSYCHIATRY & NEUROLOGY
Payer: MEDICARE

## 2021-07-26 VITALS
HEART RATE: 70 BPM | WEIGHT: 186.07 LBS | SYSTOLIC BLOOD PRESSURE: 100 MMHG | HEIGHT: 65 IN | OXYGEN SATURATION: 98 % | DIASTOLIC BLOOD PRESSURE: 62 MMHG | TEMPERATURE: 100.1 F | BODY MASS INDEX: 31 KG/M2

## 2021-07-26 DIAGNOSIS — S54.31XA: ICD-10-CM

## 2021-07-26 DIAGNOSIS — R74.8 ELEVATED CPK: ICD-10-CM

## 2021-07-26 PROCEDURE — 99212 OFFICE O/P EST SF 10 MIN: CPT | Performed by: PSYCHIATRY & NEUROLOGY

## 2021-07-26 PROCEDURE — 99204 OFFICE O/P NEW MOD 45 MIN: CPT | Performed by: PSYCHIATRY & NEUROLOGY

## 2021-07-26 ASSESSMENT — FIBROSIS 4 INDEX: FIB4 SCORE: 1.3

## 2021-07-26 NOTE — PROGRESS NOTES
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    She recalls at the very end of April 2021 she noticed when she reached or stretched her arm out, she would have a feeling that her arm was ripping or burning (a line of pain from her medial just above the wrist area to about 2 inches below the elbow region). This would \"burn\" for a few seconds. Any time she stretched her right arm out for a good 2 weeks this occur consistently.Pulling down with her right arm (ie, another type of motion with that arm did not trigger the pain). She noticed soon after this pain syndrome resolved and within 1 week or so she had felt a numbness which was more proximal to where it is now in that it is still slightly numb on the right distal medial forearm about 5 inches long and very oval in nature (about 2 months ago she had numbness higher up her arm but those more proximal areas of numbness which connected with residual numb area resolved about 1 to 2 months for which she is left at this time with elongated numb patch which does not involve the right hand or fingers or right thumb at all.    There  Had been no other pains (static,evolving or progressive of the right arm in any location) in the last 4 except the brief burning and very focal pains when reaching out and/or  extending the right arm as above.    DATE OF SERVICE:  09/02/2016     REFERRING PHYSICIAN:  Donavan Restrepo MD     REASON FOR STUDY:  EMG and nerve conduction studies were requested in both   upper and lower extremity on the right for muscle cramps and elevated creatine   kinase levels.  The patient has had a history of lumbar surgery for   spondylolisthesis.     Full reports scanned into the Epic system.  In the upper extremity, the right   median motor latency exceeds the ulnar motor latency by 1.4 " milliseconds,   which is probably significant.  Similarly, the median sensory latency exceeds   the ulnar sensory latency by 0.6 milliseconds, which is also significant.  The   ulnar conduction study around the elbow is entirely normal.     Needle exam of the right upper extremity was carried out.  Biceps, triceps,   and deltoid were normal.  The abductor pollicis brevis had chronic neurogenic   motor unit changes without acute denervation.  Surprisingly abductor digiti   quinti had chronic neurogenic motor unit changes without acute denervation.    Cervical paraspinal exam was unremarkable in terms of acute denervation.  In   the right lower extremity, gastrocnemius had fibs and positive waves and   chronic neurogenic motor unit changes, increased amplitude, polyphasic   duration and decreased numbers.  Anterior tibial had same findings, but was   somewhat less severe.  Quadriceps was normal.  Gluteus medius showed increased   percentage of polyphasic potentials.  Gluteus peggy had no acute   denervation, but did have significant chronic neurogenic motor unit changes.    Vastus lateralis also had some motor unit changes of chronic nature.    Paraspinals in the lumbar region were not evaluated because of her prior   surgery.     IMPRESSION:  1.  She has a new or persistent S1 radiculopathy.  2.  Carpal tunnel of uncertain clinical significance.  3.  Cannot rule out a cervical radiculopathy at C7-C8 or C8-T1.  Clinical   correlation is suggested, particularly with radiographic correlation.    Calf Cramps since before 2016 or so> primarily behind the knee areas.  No other areas.  One or the other areas.  No calf cramps, no toe cramps,no foot,hand,arm or thigh cramps.  Typically has one event every 3-4 weeks.  Just got off statins (had been several statins for over 30  Years- had been on rosuvastatin up to 40 mg and prior was on Atorvastatin 80 mg a day).    She has only one cramp since being on Rosuvastatin.  She  "feels that she has lesser \"lower level cramps\"> every 2 weeks (has not had any cramps in her hands in over 2  Years). 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    No sensory disturbances of the fingers,palms,feet,toes in the last 6 months.       Patient Active Problem List    Diagnosis Date Noted   • Coronary artery calcification seen on computed tomography 07/02/2021   • Numbness and tingling of right arm 06/10/2021   • Fecal smearing 06/10/2021   • Obesity (BMI 30-39.9) 06/10/2021   • Gastroesophageal reflux disease without esophagitis 06/10/2021   • Chronic right shoulder pain 05/10/2017   • Digital mucous cyst of toe of right foot 05/10/2017   • Elevated CPK 05/11/2016   • Bilateral hearing loss 12/29/2015   • Pain with urination 12/01/2015   • Muscle cramps 07/01/2015   • Primary osteoarthritis of both knees 07/01/2015   • Left foot pain 06/18/2014   • Spinal stenosis, lumbar region, without neurogenic claudication 01/29/2014   • Family history of early CAD 12/16/2013   • Chronic lower back pain 12/16/2013   • Vitamin D insufficiency 12/16/2013   • Essential hypertension, benign 07/13/2012   • Familial hyperlipidemia 07/13/2012   • Other chest pain 07/13/2012   • Undiagnosed cardiac murmurs 07/13/2012   • Abnormal stress ECG with treadmill 07/13/2012       Past medical history:   Past Medical History:   Diagnosis Date   • Arthritis 2014    hands and back   • Benign hypertension 2014    well controlled on meds pt states increased with anxiety   • Cold 2014 01/20 end of cold symptoms   • Hypercholesteremia    • Pain 2014    6/10       Past surgical history:   Past Surgical History:   Procedure Laterality Date   • LUMBAR FUSION POSTERIOR  1/29/2014    Performed by Maci Babb M.D. at SURGERY [facility]   • LUMBAR LAMINECTOMY DISKECTOMY  1/29/2014    Performed by Maci Babb M.D. at SURGERY [facility]   • LUMBAR DECOMPRESSION  " 2014    Performed by Maci Babb M.D. at SURGERY [facility]   • HYSTERECTOMY, TOTAL ABDOMINAL     • TONSILLECTOMY           Social history:   Social History     Socioeconomic History   • Marital status:      Spouse name: Not on file   • Number of children: Not on file   • Years of education: Not on file   • Highest education level: Not on file   Occupational History   • Not on file   Tobacco Use   • Smoking status: Never Smoker   • Smokeless tobacco: Never Used   Vaping Use   • Vaping Use: Never used   Substance and Sexual Activity   • Alcohol use: Yes     Alcohol/week: 1.8 oz     Types: 3 Cans of beer per week     Comment: 2-3 per week   • Drug use: No   • Sexual activity: Yes     Partners: Male   Other Topics Concern   • Not on file   Social History Narrative   • Not on file     Social Determinants of Health     Financial Resource Strain:    • Difficulty of Paying Living Expenses:    Food Insecurity:    • Worried About Running Out of Food in the Last Year:    • Ran Out of Food in the Last Year:    Transportation Needs:    • Lack of Transportation (Medical):    • Lack of Transportation (Non-Medical):    Physical Activity:    • Days of Exercise per Week:    • Minutes of Exercise per Session:    Stress:    • Feeling of Stress :    Social Connections:    • Frequency of Communication with Friends and Family:    • Frequency of Social Gatherings with Friends and Family:    • Attends Sikhism Services:    • Active Member of Clubs or Organizations:    • Attends Club or Organization Meetings:    • Marital Status:    Intimate Partner Violence:    • Fear of Current or Ex-Partner:    • Emotionally Abused:    • Physically Abused:    • Sexually Abused:        Family history:   Family History   Problem Relation Age of Onset   • Heart Disease Father          from MI at age 32   • Hyperlipidemia Father    • GI Disease Mother         GERD   • Hypertension Mother    • Hyperlipidemia Daughter    •  "Hyperlipidemia Grandchild          Current medications:   Current Outpatient Medications   Medication   • Evolocumab, REPATHA, (REPATHA SURECLICK) 140 MG/ML Solution Auto-injector   • Biotin (HM BIOTIN) 5 MG Cap   • coenzyme Q-10 30 MG capsule   • aspirin EC (ECOTRIN) 81 MG Tablet Delayed Response   • ezetimibe (ZETIA) 10 MG Tab   • lisinopril (PRINIVIL) 40 MG tablet   • amLODIPine (NORVASC) 5 MG Tab   • nitrofurantoin (MACRODANTIN) 50 MG Cap   • ascorbic acid (ASCORBIC ACID) 500 MG Tab   • vitamin D (CHOLECALCIFEROL) 1000 UNIT Tab   • ibuprofen (MOTRIN) 200 MG Tab   • vitamin B comp+C (ALLBEE WITH C) Tab     No current facility-administered medications for this visit.       Medication Allergy:  No Known Allergies        Physical examination:   Vitals:    07/26/21 1614   BP: 100/62   BP Location: Right arm   Patient Position: Sitting   BP Cuff Size: Adult   Pulse: 70   Temp: 37.8 °C (100.1 °F)   TempSrc: Temporal   SpO2: 98%   Weight: 84.4 kg (186 lb 1.1 oz)   Height: 1.651 m (5' 5\")       Normal cephalic atraumatic.  Full Range of movement around the Neck in all directions without restrictions or discrete pain evoked triggers.  No lower extremity edema.  There is no soreness,tenderness,aching when palpating the muscles of the right forearm or when asking Joanie to supinate and then pronate that arm repetitive 5 times in a row.  This back and forth supination/pronation movement(s) did not provoke/produce any discomforts/pain or sensory disturbances of the right forearm areas.      Neurological  Exam:    Mental status: Awake, alert and fully oriented to person, place, time and situation. Normal attention, concentration and fund of knowledge for education level.  Did not appear/act combative,irritable,anxious,paranoid/delusional or aggressive to or with me.  Speech and language: Speech is fluent without errors, clear, intact to repetition and intact to naming.     Follows 3 step motor commands in sequence without " significant delay and correctly.    Cranial nerve exam:  II: Pupils are equally round and reactive to light. Visual fields are intact by confrontation.  III, IV, VI: EOMI, no diplopia, no ptosis.  V: Sensation to light touch is normal over V1-3 distributions bilaterally.  .  VII: Facial movements are symmetrical. There is no facial droop. .  VIII: Hearing intact to soft speech and finger rub bilaterally  IX: Palate elevates symmetrically, uvula is midline. Dysarthria is not present.  XI: Shoulder shrug are symmetrical and strong.   XII: Tongue protrudes midline.        Motor exam:  Muscle tone is normal in all 4 limbs. and No abnormal movements appreciated.    Muscle strength:    There is atrophy of the forearms,forelegs,hands or feet.    Extra attention to Right  arm including muscle exams of the pronator teres,brachioradialis,FCU,ECR, Thumb extensors, Finger extensors (2nd through 5th) > all normal.    Neck Flexors/Extensors: 5/5       Right  Left  Deltoid   5/5  5/5      Biceps   5/5  5/5  Triceps  5/5  5/5   Wrist extensors 5/5  5/5  Wrist flexors  5/5  5/5     5/5  5/5  Interossei  5/5  5/5  Thenar (APB)  5/5  5/5   Hip flexors  5/5  5/5  Quadriceps  5/5  5/5    Hamstrings  5/5  5/5  Dorsiflexors  5/5  5/5  Plantarflexors  5/5  5/5  Toe extension  5/5  5/5  Toe Flexors                5/5                   5/5    Sensory exam:  Intact to Light touch, Pinprick, Temperature, Vibration and Proprioception in bilateral lower extremity.    Right distal forearm ( 3-4 in length and 1/2 or so in width)- reduced light touch and pin prick in this area> not extending into hand proper or dorsal/palmar hand or in the radial nerve territory.      Reflexes:       Right  Left  Biceps   2/4  2/4  Triceps  2/4  2/4  Brachioradialis 2/4  2/4  Knee jerk  2/4  2/4  Ankle jerk  2/4  2/4     Frontal release signs are absent.    bilaterally toes are downgoing to plantar stimulation..    Coordination (finger-to-nose, heel/knee/shin,  rapid alternating movements) was normal.     There was no truncal ataxia, no tremors, and no dysmetria.     Station and gait - easily stands up from exam chair without retropulsion,veering,leaning,swaying (to either side).   Arm swing symmetrical.    Able to deep knee bends x 3 without difficulty.    No rombergism.      Labs and Tests:      Ref Range & Units 1 mo ago 1 yr ago 2 yr ago 3 yr ago   CPK Total 20 - 180 U/L 578High   407High  CM  190High  R  338High  CM    Comment: REFERENCE INTERVAL: CK Total   Access complete set of age- and/or gender-specific reference   intervals for this test in the [facility] Test Directory   ([email or web address]).          5 years ago: CPK was 443.      Impression/Plans/Recommendations:    1. 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  No ongoing pain(s) of the upper limbs or hands.  Area of focal and oval numb patch or area of the mid to distal right medial forearm improved (ing) and in a small territory in the last 2 months which is encouraging.  No other features to suggest a more global polyneuropathy or C5-6 radiculopathy given the way her right forearm symptoms began and were only associated with pronation/extension maneuvers of the right arm.    Unable to provoke any pain or discomfort today with extension/supination of the right arm today.    At this point double Nerve Conduction Studies would be useful and even documenting a reduced lateral antebrachial sensory cutaneous nerve would not . We reviewed the pros and cons of doing NCS at this time and she agrees at this point to not proceed with such testing.    2. Elevated CPK- very chronic and mildly elevated.  No evidence for clinical myopathy.    Usually HMGCO reductase related immune system associated disorders lead to much higher CPK levels and proximal muscle weakness which Joanie does not have.    I reviewed the consideration of EMG testing once again (last done in 2016) but I doubt  this would be diagnostic for a specific situation or condition.    Statin associated myalgias are still a consideration and a reasonable thought given her many year history of periodic cramping (overt) in her feet or calve areas. She has recently stopped her rosuvastatin for a trial period (40 mg a day) about 1 month ago.      3. Hypercholesteremia (Familial)- on repatha recently started.     Total time spent today or this patient's care was 54  minutes and included reviewing diagnostic workup to date (labs and imaging that include interpreting such tests relevant to this patient's neurological condition) and giving advise and suggestions on the present neurological problem and  documenting the clinical information in the EMR.    Follow up PRN at this point.    Og Alejandra MD  [place] [facility] [employer].   [facility]

## 2021-08-10 DIAGNOSIS — I10 ESSENTIAL HYPERTENSION, BENIGN: ICD-10-CM

## 2021-08-12 ENCOUNTER — APPOINTMENT (OUTPATIENT)
Dept: MEDICAL GROUP | Facility: MEDICAL CENTER | Age: 66
End: 2021-08-12
Payer: MEDICARE

## 2021-09-08 ENCOUNTER — HOSPITAL ENCOUNTER (OUTPATIENT)
Dept: LAB | Facility: MEDICAL CENTER | Age: 66
End: 2021-09-08
Attending: INTERNAL MEDICINE
Payer: MEDICARE

## 2021-09-08 DIAGNOSIS — E78.49 FAMILIAL HYPERLIPIDEMIA: ICD-10-CM

## 2021-09-08 DIAGNOSIS — I10 ESSENTIAL HYPERTENSION, BENIGN: ICD-10-CM

## 2021-09-08 LAB
ALBUMIN SERPL BCP-MCNC: 4.6 G/DL (ref 3.2–4.9)
ALBUMIN/GLOB SERPL: 2.7 G/DL
ALP SERPL-CCNC: 83 U/L (ref 30–99)
ALT SERPL-CCNC: 19 U/L (ref 2–50)
ANION GAP SERPL CALC-SCNC: 8 MMOL/L (ref 7–16)
AST SERPL-CCNC: 23 U/L (ref 12–45)
BILIRUB SERPL-MCNC: 0.5 MG/DL (ref 0.1–1.5)
BUN SERPL-MCNC: 12 MG/DL (ref 8–22)
CALCIUM SERPL-MCNC: 9 MG/DL (ref 8.4–10.2)
CHLORIDE SERPL-SCNC: 102 MMOL/L (ref 96–112)
CHOLEST SERPL-MCNC: 238 MG/DL (ref 100–199)
CK SERPL-CCNC: 278 U/L (ref 0–154)
CO2 SERPL-SCNC: 25 MMOL/L (ref 20–33)
CREAT SERPL-MCNC: 0.67 MG/DL (ref 0.5–1.4)
FASTING STATUS PATIENT QL REPORTED: NORMAL
FASTING STATUS PATIENT QL REPORTED: NORMAL
GLOBULIN SER CALC-MCNC: 1.7 G/DL (ref 1.9–3.5)
GLUCOSE SERPL-MCNC: 100 MG/DL (ref 65–99)
HDLC SERPL-MCNC: 47 MG/DL
LDLC SERPL CALC-MCNC: 148 MG/DL
POTASSIUM SERPL-SCNC: 4.1 MMOL/L (ref 3.6–5.5)
PROT SERPL-MCNC: 6.3 G/DL (ref 6–8.2)
SODIUM SERPL-SCNC: 135 MMOL/L (ref 135–145)
TRIGL SERPL-MCNC: 214 MG/DL (ref 0–149)

## 2021-09-08 PROCEDURE — 86431 RHEUMATOID FACTOR QUANT: CPT

## 2021-09-08 PROCEDURE — 80061 LIPID PANEL: CPT

## 2021-09-08 PROCEDURE — 86160 COMPLEMENT ANTIGEN: CPT

## 2021-09-08 PROCEDURE — 82550 ASSAY OF CK (CPK): CPT

## 2021-09-08 PROCEDURE — 36415 COLL VENOUS BLD VENIPUNCTURE: CPT

## 2021-09-08 PROCEDURE — 80053 COMPREHEN METABOLIC PANEL: CPT

## 2021-09-08 PROCEDURE — 86038 ANTINUCLEAR ANTIBODIES: CPT

## 2021-09-10 LAB
C3 SERPL-MCNC: 130 MG/DL (ref 88–201)
C4 SERPL-MCNC: 29 MG/DL (ref 10–40)
NUCLEAR IGG SER QL IA: NORMAL
RHEUMATOID FACT SER NEPH-ACNC: <10 IU/ML (ref 0–14)

## 2021-09-13 ENCOUNTER — OFFICE VISIT (OUTPATIENT)
Dept: MEDICAL GROUP | Facility: MEDICAL CENTER | Age: 66
End: 2021-09-13
Payer: MEDICARE

## 2021-09-13 VITALS
HEIGHT: 65 IN | BODY MASS INDEX: 30.12 KG/M2 | OXYGEN SATURATION: 95 % | DIASTOLIC BLOOD PRESSURE: 74 MMHG | TEMPERATURE: 99.7 F | HEART RATE: 76 BPM | SYSTOLIC BLOOD PRESSURE: 112 MMHG | WEIGHT: 180.78 LBS | RESPIRATION RATE: 14 BRPM

## 2021-09-13 DIAGNOSIS — R73.01 IMPAIRED FASTING GLUCOSE: ICD-10-CM

## 2021-09-13 DIAGNOSIS — E78.49 FAMILIAL HYPERLIPIDEMIA: ICD-10-CM

## 2021-09-13 DIAGNOSIS — I10 ESSENTIAL HYPERTENSION, BENIGN: ICD-10-CM

## 2021-09-13 DIAGNOSIS — R74.8 ELEVATED CPK: ICD-10-CM

## 2021-09-13 PROCEDURE — 99214 OFFICE O/P EST MOD 30 MIN: CPT | Performed by: INTERNAL MEDICINE

## 2021-09-13 ASSESSMENT — FIBROSIS 4 INDEX: FIB4 SCORE: 1.34

## 2021-09-13 NOTE — PROGRESS NOTES
Established Patient    Joanie Arenas is a 66 y.o. female who presents today with the following:    CC:   Chief Complaint   Patient presents with   • Follow-Up   • Lab Results                HPI:     Elevated CPK  Improving after she stopped statin.  Her muscle symptoms also resolved       Essential hypertension, benign  Stable on lisinopril and amlodipine      Impaired fasting glucose  Healthful lifestyle measures          Current Outpatient Medications   Medication Sig Dispense Refill   • ezetimibe (ZETIA) 10 MG Tab TAKE 1 TABLET BY MOUTH EVERY DAY 90 tablet 3   • Evolocumab, REPATHA, (REPATHA SURECLICK) 140 MG/ML Solution Auto-injector Inject 1 Each under the skin every 14 days. 6 Each 3   • Biotin (HM BIOTIN) 5 MG Cap Take  by mouth.     • coenzyme Q-10 30 MG capsule Take 300 mg by mouth every day.     • aspirin EC (ECOTRIN) 81 MG Tablet Delayed Response Take 1 tablet by mouth every day. 90 tablet 3   • lisinopril (PRINIVIL) 40 MG tablet Take 1 tablet by mouth every day. 90 tablet 3   • amLODIPine (NORVASC) 5 MG Tab Take 1 tablet by mouth every day. 90 tablet 3   • nitrofurantoin (MACRODANTIN) 50 MG Cap Take 50 mg by mouth 4 times a day. Per pt takes it about every 2 weeks     • ascorbic acid (ASCORBIC ACID) 500 MG Tab Take 500 mg by mouth every day.     • vitamin D (CHOLECALCIFEROL) 1000 UNIT Tab Take 1,000 Units by mouth every day.     • ibuprofen (MOTRIN) 200 MG Tab Take 200 mg by mouth every 6 hours as needed.     • vitamin B comp+C (ALLBEE WITH C) Tab Take 1 Tab by mouth every day. (Patient not taking: Reported on 7/26/2021)       No current facility-administered medications for this visit.       Allergies, past medical history, past surgical history, medications, family history, social history reviewed and updated.    ROS   Constitutional: Denies fevers or chills  Eyes: Denies changes in vision  Ears/Nose/Throat/Mouth: Denies nasal congestion or sore throat   Cardiovascular: Denies chest pain or  "palpitations   Respiratory: Denies shortness of breath , Denies cough  Gastrointestinal/Hepatic: Denies abd pain, nausea, vomiting   Genitourinary: Denies dysuria or frequency  Musculoskeletal/Rheum: Denies joint pain and swelling   Neurological: Denies headache  Psychiatric: Denies mood disorder   Endocrine: Denies hx of diabetes or thyroid dysfunction  Heme/Oncology/Lymph Nodes: Denies weight changes or enlarged LNs.    Physical Exam  Vitals: /74 (BP Location: Left arm, Patient Position: Sitting, BP Cuff Size: Adult)   Pulse 76   Temp 37.6 °C (99.7 °F) (Temporal)   Resp 14   Ht 1.64 m (5' 4.57\")   Wt 82 kg (180 lb 12.4 oz)   SpO2 95%   BMI 30.49 kg/m²   General: Alert, pleasant, NAD  HEENT: Normocephalic.  EOMI, no icterus or pallor.  Conjunctivae and lids normal. External ears normal. Wearing a mask. Oropharynx non-erythematous, mucous membranes moist.  Neck supple.  No thyromegaly or masses palpated.   Lymph: No cervical or supraclavicular lymphadenopathy.  Cardiovascular: Regular rate and rhythm.   Respiratory: Normal respiratory effort.  Clear to auscultation bilaterally.  Abdomen: Non-distended, soft, non-tender  Skin: Warm, dry, no rashes.  Musculoskeletal: Gait is normal.  Moves all extremities well.  Extremities: No leg edema.  Radial pulses 2+ symmetric.   Psych:  Affect/mood is normal, judgement is good, memory is intact, grooming is appropriate.      Labs (9/8/21) were reviewed and discussed with patients.  All questions were answered.      Assessment and Plan    1. Elevated CPK  - CREATINE KINASE; Future  - CBC WITH DIFFERENTIAL; Future  Her myalgia resolved and CPK improved after she stopped statin    2. Impaired fasting glucose  - Comp Metabolic Panel; Future  - HEMOGLOBIN A1C; Future  Healthful lifestyle measures    3. Familial hyperlipidemia  - TSH WITH REFLEX TO FT4; Future  - Lipid Profile; Future  Not on statin due to her myalgia and elevated CPK  Her myalgia resolved and CPK " improved after she stopped statin  She is on zetia and repatha   Following with cardiology    4. Essential hypertension, benign  - Comp Metabolic Panel; Future  - CBC WITH DIFFERENTIAL; Future  Continue amlodipine and lisinopril      Follow-up:Return in about 6 months (around 3/13/2022), or if symptoms worsen or fail to improve.    This note was created using voice recognition software. There may be unintended errors in spelling, grammar or content.

## 2021-10-13 ENCOUNTER — OFFICE VISIT (OUTPATIENT)
Dept: CARDIOLOGY | Facility: MEDICAL CENTER | Age: 66
End: 2021-10-13
Payer: MEDICARE

## 2021-10-13 VITALS
OXYGEN SATURATION: 96 % | DIASTOLIC BLOOD PRESSURE: 78 MMHG | WEIGHT: 182 LBS | RESPIRATION RATE: 14 BRPM | HEART RATE: 70 BPM | SYSTOLIC BLOOD PRESSURE: 114 MMHG | BODY MASS INDEX: 30.32 KG/M2 | HEIGHT: 65 IN

## 2021-10-13 DIAGNOSIS — E78.49 FAMILIAL HYPERLIPIDEMIA: ICD-10-CM

## 2021-10-13 PROCEDURE — 99214 OFFICE O/P EST MOD 30 MIN: CPT | Performed by: INTERNAL MEDICINE

## 2021-10-13 RX ORDER — AMOXICILLIN AND CLAVULANATE POTASSIUM 875; 125 MG/1; MG/1
TABLET, FILM COATED ORAL
COMMUNITY
Start: 2021-09-27 | End: 2021-10-13

## 2021-10-13 RX ORDER — NEOMYCIN SULFATE 500 MG/1
TABLET ORAL
COMMUNITY
Start: 2021-09-15 | End: 2022-03-15

## 2021-10-13 ASSESSMENT — ENCOUNTER SYMPTOMS
MUSCLE CRAMPS: 1
NAUSEA: 0
DEPRESSION: 0
COUGH: 0
DIZZINESS: 0
BLURRED VISION: 0
IRREGULAR HEARTBEAT: 0
VOMITING: 0
WEIGHT GAIN: 0
ALTERED MENTAL STATUS: 0
WEIGHT LOSS: 0
DECREASED APPETITE: 0
SYNCOPE: 0
DYSPNEA ON EXERTION: 0
CONSTIPATION: 0
DIARRHEA: 0
FLANK PAIN: 0
NEAR-SYNCOPE: 0
HEARTBURN: 0
PND: 0
PALPITATIONS: 0
CLAUDICATION: 0
FEVER: 0
ABDOMINAL PAIN: 0
ORTHOPNEA: 0
SHORTNESS OF BREATH: 0
BACK PAIN: 0

## 2021-10-13 ASSESSMENT — FIBROSIS 4 INDEX: FIB4 SCORE: 1.34

## 2021-10-13 NOTE — PROGRESS NOTES
Cardiology Note    Chief Complaint   Patient presents with   • Hypertension     F/V Dx: Essential hypertension, benign   • Hyperlipidemia     F/V Dx: Familial hyperlipidemia   • Abnormal Cardiac Function Testing     F/V Dx: Abnormal stress ECG with treadmill       History of Present Illness: Joanie Arenas is a 66 y.o. female PMH familial HLD ( in 2015), CAC on CT, HTN who presents for follow up.    Doing well this visit. Much more tolerant of pcsk9. Had single cramp behind knee. Does walk. No exertional symptoms. Had epigastric pain radiating up neck after a meal but admits is on antibiotics for bacterial overgrowth she describes. Following with GI. Was on pcsk9 for 1 mo prior to lipid repeat.     Review of Systems   Constitutional: Negative for decreased appetite, fever, malaise/fatigue, weight gain and weight loss.   HENT: Negative for congestion and nosebleeds.    Eyes: Negative for blurred vision.   Cardiovascular: Negative for chest pain, claudication, dyspnea on exertion, irregular heartbeat, leg swelling, near-syncope, orthopnea, palpitations, paroxysmal nocturnal dyspnea and syncope.   Respiratory: Negative for cough and shortness of breath.    Endocrine: Negative for cold intolerance and heat intolerance.   Skin: Negative for rash.   Musculoskeletal: Positive for muscle cramps. Negative for back pain.   Gastrointestinal: Negative for abdominal pain, constipation, diarrhea, heartburn, melena, nausea and vomiting.   Genitourinary: Negative for dysuria, flank pain and hematuria.   Neurological: Negative for dizziness.   Psychiatric/Behavioral: Negative for altered mental status and depression.         Past Medical History:   Diagnosis Date   • Arthritis 2014    hands and back   • Benign hypertension 2014    well controlled on meds pt states increased with anxiety   • Cold 2014 01/20 end of cold symptoms   • Hypercholesteremia    • Pain 2014    6/10         Past Surgical History:   Procedure  Laterality Date   • LUMBAR FUSION POSTERIOR  2014    Performed by Maci Babb M.D. at SURGERY [facility]   • LUMBAR LAMINECTOMY DISKECTOMY  2014    Performed by Maci Babb M.D. at SUR[facility]ERY [facility]   • LUMBAR DECOMPRESSION  2014    Performed by Maci Babb M.D. at SURGERY [facility]   • HYSTERECTOMY, TOTAL ABDOMINAL     • TONSILLECTOMY           Current Outpatient Medications   Medication Sig Dispense Refill   • neomycin (MYCIFRADIN) 500 MG Tab      • ezetimibe (ZETIA) 10 MG Tab TAKE 1 TABLET BY MOUTH EVERY DAY 90 tablet 3   • Evolocumab, REPATHA, (REPATHA SURECLICK) 140 MG/ML Solution Auto-injector Inject 1 Each under the skin every 14 days. 6 Each 3   • Biotin (HM BIOTIN) 5 MG Cap Take  by mouth.     • coenzyme Q-10 30 MG capsule Take 300 mg by mouth every day.     • aspirin EC (ECOTRIN) 81 MG Tablet Delayed Response Take 1 tablet by mouth every day. 90 tablet 3   • lisinopril (PRINIVIL) 40 MG tablet Take 1 tablet by mouth every day. 90 tablet 3   • amLODIPine (NORVASC) 5 MG Tab Take 1 tablet by mouth every day. 90 tablet 3   • nitrofurantoin (MACRODANTIN) 50 MG Cap Take 50 mg by mouth 4 times a day. Per pt takes it about every 2 weeks     • vitamin B comp+C (ALLBEE WITH C) Tab Take 1 Tablet by mouth every day.     • ascorbic acid (ASCORBIC ACID) 500 MG Tab Take 500 mg by mouth every day.     • vitamin D (CHOLECALCIFEROL) 1000 UNIT Tab Take 1,000 Units by mouth every day.     • ibuprofen (MOTRIN) 200 MG Tab Take 200 mg by mouth every 6 hours as needed.       No current facility-administered medications for this visit.         No Known Allergies      Family History   Problem Relation Age of Onset   • Heart Disease Father          from MI at age 32   • Hyperlipidemia Father    • GI Disease Mother         GERD   • Hypertension Mother    • Hyperlipidemia Daughter    • Hyperlipidemia Grandchild          Social History     Socioeconomic History   • Marital status:  "     Spouse name: Not on file   • Number of children: Not on file   • Years of education: Not on file   • Highest education level: Not on file   Occupational History   • Not on file   Tobacco Use   • Smoking status: Never Smoker   • Smokeless tobacco: Never Used   Vaping Use   • Vaping Use: Never used   Substance and Sexual Activity   • Alcohol use: Yes     Alcohol/week: 1.8 oz     Types: 3 Cans of beer per week     Comment: 2-3 per week   • Drug use: No   • Sexual activity: Yes     Partners: Male   Other Topics Concern   • Not on file   Social History Narrative   • Not on file     Social Determinants of Health     Financial Resource Strain:    • Difficulty of Paying Living Expenses:    Food Insecurity:    • Worried About Running Out of Food in the Last Year:    • Ran Out of Food in the Last Year:    Transportation Needs:    • Lack of Transportation (Medical):    • Lack of Transportation (Non-Medical):    Physical Activity:    • Days of Exercise per Week:    • Minutes of Exercise per Session:    Stress:    • Feeling of Stress :    Social Connections:    • Frequency of Communication with Friends and Family:    • Frequency of Social Gatherings with Friends and Family:    • Attends Sabianist Services:    • Active Member of Clubs or Organizations:    • Attends Club or Organization Meetings:    • Marital Status:    Intimate Partner Violence:    • Fear of Current or Ex-Partner:    • Emotionally Abused:    • Physically Abused:    • Sexually Abused:          Physical Exam:  Ambulatory Vitals  /78 (BP Location: Left arm, Patient Position: Sitting, BP Cuff Size: Adult)   Pulse 70   Resp 14   Ht 1.64 m (5' 4.57\")   Wt 82.6 kg (182 lb)   SpO2 96%    BP Readings from Last 4 Encounters:   10/13/21 114/78   09/13/21 112/74   07/26/21 100/62   07/02/21 118/70     Weight/BMI:   Vitals:    10/13/21 1044   BP: 114/78   Weight: 82.6 kg (182 lb)   Height: 1.64 m (5' 4.57\")    Body mass index is 30.69 kg/m².  Wt " Readings from Last 4 Encounters:   10/13/21 82.6 kg (182 lb)   09/13/21 82 kg (180 lb 12.4 oz)   07/26/21 84.4 kg (186 lb 1.1 oz)   07/02/21 81.1 kg (178 lb 12.8 oz)       Physical Exam  Constitutional:       General: She is not in acute distress.  HENT:      Head: Normocephalic and atraumatic.   Eyes:      Conjunctiva/sclera: Conjunctivae normal.      Pupils: Pupils are equal, round, and reactive to light.   Neck:      Vascular: No JVD.   Cardiovascular:      Rate and Rhythm: Normal rate and regular rhythm.      Heart sounds: Normal heart sounds. No murmur heard.   No friction rub. No gallop.    Pulmonary:      Effort: Pulmonary effort is normal. No respiratory distress.      Breath sounds: Normal breath sounds. No wheezing or rales.   Chest:      Chest wall: No tenderness.   Abdominal:      General: Bowel sounds are normal. There is no distension.      Palpations: Abdomen is soft.   Musculoskeletal:      Cervical back: Normal range of motion and neck supple.   Skin:     General: Skin is warm and dry.   Neurological:      Mental Status: She is alert and oriented to person, place, and time.   Psychiatric:         Mood and Affect: Affect normal.         Judgment: Judgment normal.         Lab Data Review:  Lab Results   Component Value Date/Time    CHOLSTRLTOT 238 (H) 09/08/2021 08:04 AM     (H) 09/08/2021 08:04 AM    HDL 47 09/08/2021 08:04 AM    TRIGLYCERIDE 214 (H) 09/08/2021 08:04 AM       Lab Results   Component Value Date/Time    SODIUM 135 09/08/2021 08:06 AM    POTASSIUM 4.1 09/08/2021 08:06 AM    CHLORIDE 102 09/08/2021 08:06 AM    CO2 25 09/08/2021 08:06 AM    GLUCOSE 100 (H) 09/08/2021 08:06 AM    BUN 12 09/08/2021 08:06 AM    CREATININE 0.67 09/08/2021 08:06 AM    CREATININE 0.7 05/04/2007 10:30 AM    BUNCREATRAT 21 05/08/2017 08:27 AM     CrCl cannot be calculated (Patient's most recent lab result is older than the maximum 7 days allowed.).  Lab Results   Component Value Date/Time    ALKPHOSPHAT 83  09/08/2021 08:06 AM    ASTSGOT 23 09/08/2021 08:06 AM    ALTSGPT 19 09/08/2021 08:06 AM    TBILIRUBIN 0.5 09/08/2021 08:06 AM      Lab Results   Component Value Date/Time    WBC 6.7 06/25/2021 07:43 AM     No results found for: HBA1C  No components found for: TROP      Cardiac Imaging and Procedures Review:      TTE 7/2012  Left Ventricle   Normal left ventricular size and function. Normal left ventricular wall   thickness. Normal left ventricular systolic function. Grade II   diastolic dysfunction is suggested by inflow Doppler and TDI but LA   dimension is normal. Left ventricular ejection fraction is 60% to 65%.   Normal regional wall motion.   CONCLUSIONS   Normal left ventricular size and function.   Very mild aortic valve leaflet thickening without significant stenosis   or regurgitation.   Trace mitral regurgitation.     Stress echo 2012  CONCLUSIONS   Normal stress echocardiogram.     CAC CT 2017  Coronary calcification:  LMA - 0.0  LCX - 0.0  LAD - 17.4  RCA - 9.6  PDA - 0.0  Calcium score:  27.0    Medical Decision Making:  Problem List Items Addressed This Visit     Familial hyperlipidemia    Relevant Orders    Lipid Profile    CREATINE KINASE        HTN - goal 120/80. Controlled. Continue regimen.    Familial HLD - Continue repatha and zetia. Repeat lipids in 6 months w cpk. Trial increase stretching and balance exercises. Her daughter does hot yoga.    CAC on CT - continue aspirin. Continue pcsk9 inhibitor. Goal LDL <70.     It was my pleasure to meet with Ms. Arenas.

## 2022-01-18 ENCOUNTER — TELEPHONE (OUTPATIENT)
Dept: CARDIOLOGY | Facility: MEDICAL CENTER | Age: 67
End: 2022-01-18

## 2022-01-18 NOTE — TELEPHONE ENCOUNTER
REN SHRESTHA (Key: [id number])  Repatha SureClick 140MG/ML auto-injectors     Form  [employer] Electronic PA Form  Created  2 days ago  Sent to Plan  3 hours ago  Plan Response  3 hours ago  Submit Clinical Questions  3 hours ago  Determination  Favorable  1 hour ago  Message from Plan  PA Case: [id number], Status: Approved, Coverage Starts on: 1/1/2022 12:00:00 AM, Coverage Ends on: 12/31/2022 12:00:00 AM. Questions? Contact [phone].

## 2022-01-18 NOTE — TELEPHONE ENCOUNTER
REN SHRESTHA (Key: [id number])  Repatha SureClick 140MG/ML auto-injectors     Form  [employer] Electronic PA Form  Created  2 days ago  Sent to Plan  5 minutes ago  Plan Response  5 minutes ago  Submit Clinical Questions  less than a minute ago  Determination  Wait for Determination  [facility] to [facility] a determination.

## 2022-03-10 ENCOUNTER — HOSPITAL ENCOUNTER (OUTPATIENT)
Dept: LAB | Facility: MEDICAL CENTER | Age: 67
End: 2022-03-10
Attending: INTERNAL MEDICINE
Payer: MEDICARE

## 2022-03-10 DIAGNOSIS — R73.01 IMPAIRED FASTING GLUCOSE: ICD-10-CM

## 2022-03-10 DIAGNOSIS — R74.8 ELEVATED CPK: ICD-10-CM

## 2022-03-10 DIAGNOSIS — E78.49 FAMILIAL HYPERLIPIDEMIA: ICD-10-CM

## 2022-03-10 DIAGNOSIS — I10 ESSENTIAL HYPERTENSION, BENIGN: ICD-10-CM

## 2022-03-10 LAB
ALBUMIN SERPL BCP-MCNC: 4.6 G/DL (ref 3.2–4.9)
ALBUMIN/GLOB SERPL: 2.6 G/DL
ALP SERPL-CCNC: 93 U/L (ref 30–99)
ALT SERPL-CCNC: 20 U/L (ref 2–50)
ANION GAP SERPL CALC-SCNC: 12 MMOL/L (ref 7–16)
AST SERPL-CCNC: 18 U/L (ref 12–45)
BASOPHILS # BLD AUTO: 1.2 % (ref 0–1.8)
BASOPHILS # BLD: 0.07 K/UL (ref 0–0.12)
BILIRUB SERPL-MCNC: 0.5 MG/DL (ref 0.1–1.5)
BUN SERPL-MCNC: 13 MG/DL (ref 8–22)
CALCIUM SERPL-MCNC: 9.2 MG/DL (ref 8.4–10.2)
CHLORIDE SERPL-SCNC: 105 MMOL/L (ref 96–112)
CHOLEST SERPL-MCNC: 253 MG/DL (ref 100–199)
CK SERPL-CCNC: 177 U/L (ref 0–154)
CO2 SERPL-SCNC: 25 MMOL/L (ref 20–33)
CREAT SERPL-MCNC: 0.63 MG/DL (ref 0.5–1.4)
EOSINOPHIL # BLD AUTO: 0.11 K/UL (ref 0–0.51)
EOSINOPHIL NFR BLD: 1.9 % (ref 0–6.9)
ERYTHROCYTE [DISTWIDTH] IN BLOOD BY AUTOMATED COUNT: 43.4 FL (ref 35.9–50)
EST. AVERAGE GLUCOSE BLD GHB EST-MCNC: 114 MG/DL
FASTING STATUS PATIENT QL REPORTED: NORMAL
GLOBULIN SER CALC-MCNC: 1.8 G/DL (ref 1.9–3.5)
GLUCOSE SERPL-MCNC: 97 MG/DL (ref 65–99)
HBA1C MFR BLD: 5.6 % (ref 4–5.6)
HCT VFR BLD AUTO: 44.9 % (ref 37–47)
HDLC SERPL-MCNC: 46 MG/DL
HGB BLD-MCNC: 14.5 G/DL (ref 12–16)
IMM GRANULOCYTES # BLD AUTO: 0.04 K/UL (ref 0–0.11)
IMM GRANULOCYTES NFR BLD AUTO: 0.7 % (ref 0–0.9)
LDLC SERPL CALC-MCNC: 156 MG/DL
LYMPHOCYTES # BLD AUTO: 1.89 K/UL (ref 1–4.8)
LYMPHOCYTES NFR BLD: 31.9 % (ref 22–41)
MCH RBC QN AUTO: 29.1 PG (ref 27–33)
MCHC RBC AUTO-ENTMCNC: 32.3 G/DL (ref 33.6–35)
MCV RBC AUTO: 90 FL (ref 81.4–97.8)
MONOCYTES # BLD AUTO: 0.54 K/UL (ref 0–0.85)
MONOCYTES NFR BLD AUTO: 9.1 % (ref 0–13.4)
NEUTROPHILS # BLD AUTO: 3.27 K/UL (ref 2–7.15)
NEUTROPHILS NFR BLD: 55.2 % (ref 44–72)
NRBC # BLD AUTO: 0 K/UL
NRBC BLD-RTO: 0 /100 WBC
PLATELET # BLD AUTO: 284 K/UL (ref 164–446)
PMV BLD AUTO: 9.5 FL (ref 9–12.9)
POTASSIUM SERPL-SCNC: 4.4 MMOL/L (ref 3.6–5.5)
PROT SERPL-MCNC: 6.4 G/DL (ref 6–8.2)
RBC # BLD AUTO: 4.99 M/UL (ref 4.2–5.4)
SODIUM SERPL-SCNC: 142 MMOL/L (ref 135–145)
TRIGL SERPL-MCNC: 253 MG/DL (ref 0–149)
TSH SERPL DL<=0.005 MIU/L-ACNC: 2.45 UIU/ML (ref 0.38–5.33)
WBC # BLD AUTO: 5.9 K/UL (ref 4.8–10.8)

## 2022-03-10 PROCEDURE — 80061 LIPID PANEL: CPT

## 2022-03-10 PROCEDURE — 80053 COMPREHEN METABOLIC PANEL: CPT

## 2022-03-10 PROCEDURE — 36415 COLL VENOUS BLD VENIPUNCTURE: CPT

## 2022-03-10 PROCEDURE — 82550 ASSAY OF CK (CPK): CPT

## 2022-03-10 PROCEDURE — 85025 COMPLETE CBC W/AUTO DIFF WBC: CPT

## 2022-03-10 PROCEDURE — 84443 ASSAY THYROID STIM HORMONE: CPT

## 2022-03-10 PROCEDURE — 83036 HEMOGLOBIN GLYCOSYLATED A1C: CPT | Mod: GA

## 2022-03-13 ENCOUNTER — PATIENT MESSAGE (OUTPATIENT)
Dept: CARDIOLOGY | Facility: MEDICAL CENTER | Age: 67
End: 2022-03-13
Payer: MEDICARE

## 2022-03-14 ENCOUNTER — TELEPHONE (OUTPATIENT)
Dept: MEDICAL GROUP | Facility: MEDICAL CENTER | Age: 67
End: 2022-03-14

## 2022-03-14 NOTE — TELEPHONE ENCOUNTER
Phone Number Called: [phone] (home) [phone] (work)      Call outcome: Spoke to patient regarding message below.    Message: LVM with Pt to call and reschedule TS

## 2022-03-15 ENCOUNTER — OFFICE VISIT (OUTPATIENT)
Dept: MEDICAL GROUP | Facility: MEDICAL CENTER | Age: 67
End: 2022-03-15
Payer: MEDICARE

## 2022-03-15 VITALS
WEIGHT: 178.57 LBS | RESPIRATION RATE: 14 BRPM | OXYGEN SATURATION: 97 % | DIASTOLIC BLOOD PRESSURE: 78 MMHG | BODY MASS INDEX: 29.75 KG/M2 | HEART RATE: 70 BPM | SYSTOLIC BLOOD PRESSURE: 124 MMHG | HEIGHT: 65 IN | TEMPERATURE: 98.5 F

## 2022-03-15 DIAGNOSIS — Z12.31 ENCOUNTER FOR SCREENING MAMMOGRAM FOR BREAST CANCER: ICD-10-CM

## 2022-03-15 DIAGNOSIS — R74.8 ELEVATED CPK: ICD-10-CM

## 2022-03-15 DIAGNOSIS — E78.49 FAMILIAL HYPERLIPIDEMIA: ICD-10-CM

## 2022-03-15 DIAGNOSIS — I10 ESSENTIAL HYPERTENSION, BENIGN: ICD-10-CM

## 2022-03-15 PROCEDURE — 99214 OFFICE O/P EST MOD 30 MIN: CPT | Performed by: INTERNAL MEDICINE

## 2022-03-15 RX ORDER — ERGOCALCIFEROL (VITAMIN D2) 50 MCG
CAPSULE ORAL
COMMUNITY

## 2022-03-15 RX ORDER — AMLODIPINE BESYLATE 5 MG/1
5 TABLET ORAL EVERY EVENING
Qty: 90 TABLET | Refills: 3 | Status: SHIPPED | OUTPATIENT
Start: 2022-03-15 | End: 2023-04-19 | Stop reason: SDUPTHER

## 2022-03-15 RX ORDER — LISINOPRIL 40 MG/1
40 TABLET ORAL EVERY EVENING
Qty: 90 TABLET | Refills: 3 | Status: SHIPPED | OUTPATIENT
Start: 2022-03-15 | End: 2023-04-19 | Stop reason: SDUPTHER

## 2022-03-15 ASSESSMENT — FIBROSIS 4 INDEX: FIB4 SCORE: 0.98

## 2022-03-15 ASSESSMENT — PATIENT HEALTH QUESTIONNAIRE - PHQ9: CLINICAL INTERPRETATION OF PHQ2 SCORE: 0

## 2022-03-15 NOTE — ASSESSMENT & PLAN NOTE
On Repatha and Zetia  Healthful lifestyle measures    LDL still elevated  She will discuss with cardiology about increasing the dosage of Repatha

## 2022-03-15 NOTE — PROGRESS NOTES
Established Patient    Joanie Arenas is a 67 y.o. female who presents today with the following:    CC:   Chief Complaint   Patient presents with   • Follow-Up   • Lab Results       HPI:       Problem   Elevated Cpk    Improving after she stopped statin.  Her muscle symptoms also resolved      Essential Hypertension, Benign    Stable on lisinopril and amlodipine     Familial Hyperlipidemia    On Repatha and Zetia  Healthful lifestyle measures    LDL still elevated  She will discuss with cardiology about increasing the dosage of Repatha            Current Outpatient Medications   Medication Sig Dispense Refill   • Vitamin D, Ergocalciferol, 50 MCG (2000 UT) Cap Vitamin D     • lisinopril (PRINIVIL) 40 MG tablet Take 1 Tablet by mouth every evening. 90 Tablet 3   • amLODIPine (NORVASC) 5 MG Tab Take 1 Tablet by mouth every evening. 90 Tablet 3   • ezetimibe (ZETIA) 10 MG Tab TAKE 1 TABLET BY MOUTH EVERY DAY 90 tablet 3   • Evolocumab, REPATHA, (REPATHA SURECLICK) 140 MG/ML Solution Auto-injector Inject 1 Each under the skin every 14 days. 6 Each 3   • Biotin 5 MG Cap Take  by mouth.     • coenzyme Q-10 30 MG capsule Take 300 mg by mouth every day.     • aspirin EC (ECOTRIN) 81 MG Tablet Delayed Response Take 1 tablet by mouth every day. 90 tablet 3   • nitrofurantoin (MACRODANTIN) 50 MG Cap Take 50 mg by mouth 4 times a day. Per pt takes it about every 2 weeks     • vitamin B comp+C (ALLBEE WITH C) Tab Take 1 Tablet by mouth every day.     • ascorbic acid (ASCORBIC ACID) 500 MG Tab Take 500 mg by mouth every day.     • ibuprofen (MOTRIN) 200 MG Tab Take 200 mg by mouth every 6 hours as needed.       No current facility-administered medications for this visit.       Allergies, past medical history, past surgical history, medications, family history, social history reviewed and updated.    ROS   Constitutional: Denies fevers or chills  Eyes: Denies changes in vision  Ears/Nose/Throat/Mouth: Denies nasal  "congestion or sore throat   Cardiovascular: Denies chest pain or palpitations   Respiratory: Denies shortness of breath , Denies cough  Gastrointestinal/Hepatic: Denies abd pain, nausea, vomiting   Genitourinary: Denies dysuria or frequency  Musculoskeletal/Rheum: Denies joint pain and swelling   Neurological: Denies headache  Psychiatric: Denies mood disorder   Endocrine: Denies hx of diabetes or thyroid dysfunction  Heme/Oncology/Lymph Nodes: Denies weight changes or enlarged LNs.    Physical Exam  Vitals: /78 (BP Location: Left arm, Patient Position: Sitting, BP Cuff Size: Adult)   Pulse 70   Temp 36.9 °C (98.5 °F) (Temporal)   Resp 14   Ht 1.64 m (5' 4.57\")   Wt 81 kg (178 lb 9.2 oz)   SpO2 97%   BMI 30.12 kg/m²   General: Alert, pleasant, NAD  HEENT: Normocephalic.  EOMI, no icterus or pallor.  Conjunctivae and lids normal. External ears normal. Wearing a mask. Oropharynx non-erythematous, mucous membranes moist.  Neck supple.  No thyromegaly or masses palpated.   Lymph: No cervical or supraclavicular lymphadenopathy.  Cardiovascular: Regular rate and rhythm.    Respiratory: Normal respiratory effort.  Clear to auscultation bilaterally.  Abdomen: Non-distended, soft, non-tender  Skin: Warm, dry, no rashes.  Musculoskeletal: Gait is normal.  Moves all extremities well.  Extremities: No leg edema.  R  Psych:  Affect/mood is normal, judgement is good, memory is intact, grooming is appropriate.      Labs (3/10/22) were reviewed and discussed with patients.  All questions were answered.      Assessment and Plan    1. Encounter for screening mammogram for breast cancer  - MA-SCREENING MAMMO BILAT W/TOMOSYNTHESIS W/CAD; Future    2. Essential hypertension, benign  - lisinopril (PRINIVIL) 40 MG tablet; Take 1 Tablet by mouth every evening.  Dispense: 90 Tablet; Refill: 3  - amLODIPine (NORVASC) 5 MG Tab; Take 1 Tablet by mouth every evening.  Dispense: 90 Tablet; Refill: 3    3. Elevated CPK  Improving " after she stopped statin.  Her muscle symptoms also resolved     4. Familial hyperlipidemia  On Repatha and Zetia  Healthful lifestyle measures    LDL still elevated  She will discuss with cardiology about increasing the dosage of Repatha        Follow-up:Return in 2 months (on 5/15/2022), or if symptoms worsen or fail to improve.    This note was created using voice recognition software. There may be unintended errors in spelling, grammar or content.

## 2022-03-24 RX ORDER — EVOLOCUMAB 420 MG/3.5
420 KIT SUBCUTANEOUS
Qty: 21.6 ML | Refills: 3 | Status: SHIPPED | OUTPATIENT
Start: 2022-03-24 | End: 2022-04-12 | Stop reason: SDUPTHER

## 2022-04-07 ENCOUNTER — PATIENT MESSAGE (OUTPATIENT)
Dept: CARDIOLOGY | Facility: MEDICAL CENTER | Age: 67
End: 2022-04-07
Payer: MEDICARE

## 2022-04-07 NOTE — PATIENT COMMUNICATION
"Called [facility] pharmacy [phone]  S/w Tameka. Tameka states medication is \"on hold\" pending PA from insurance. Tameka states she submitted medication on 3/25 and 3/29. Both times medication was denied. Tameka states the insurance phone number is [phone].          Route to Brianna MACIEL for assistance with PA  "

## 2022-04-08 ENCOUNTER — TELEPHONE (OUTPATIENT)
Dept: CARDIOLOGY | Facility: MEDICAL CENTER | Age: 67
End: 2022-04-08
Payer: MEDICARE

## 2022-04-08 NOTE — TELEPHONE ENCOUNTER
REN SHRESTHA (Key: [id number])  Repatha Pushtronex System 420MG/3.5ML on-body infusor     Form  [employer] Electronic PA Form  Created  2 minutes ago  Sent to Plan  less than a minute ago  Plan Response  less than a minute ago  Submit Clinical Questions  Determination  N/A  Message from Plan  Authorization already on file for this request.Authorization starting on 01/01/2022 and ending on 12/31/2022.

## 2022-04-08 NOTE — TELEPHONE ENCOUNTER
"Regarding: Joanie Arenas,   nieves 1955  RE:  Repatha  ----- Message from Winter Bruno R.N. sent at 2022 11:21 AM PDT -----  Gary Reed  Can you jennifer help with PA for Repatha?  Thank you     ----- Message sent from Winter Bruno R.N. to Joanie Arenas at 2022 11:18 AM -----   Joanie:    I called [facility] pharmacy and spoke to Tameka [phone]. Tameka states the medication is \"on hold\" pending prior authorization from your insurance.  Tameka states they send the medication for approval on 3/25 and 3/29, both times insurance denied. I will ask our \"prior authorization\" guru Reed if she can help get this prior authorization approved.       Thank you  Winter MISHRA  For Nikhil Meeks MD  Cardiology       ----- Message -----       From:Joanie Arenas       Sent:2022 10:11 AM PDT         To:Nurse Winter JAIN    Subject:Joanie Arenas,    1955  RE:  [facility]    Pharmacy is still waiting for approval. Can you expedite this in any way?  Joanie Arenas.      1955      ----- Message -----       From:Nurse Winter JAIN       Sent:3/24/2022  2:11 PM PDT         To:Joanie Arenas    Subject:nieves Alarcon 1955  RE:  Repatha    Gary Nair:    I have sent the new Rx to [facility] per your request.    Thank you  Winter MISHRA  For Nikhil Meeks MD  Cardiology       ----- Message -----       From:Joanie Arenas       Sent:3/23/2022  8:57 AM PDT         To:Physician Nikhil Meeks    Subject:nieves Alarcon 1955  RE:  Repatha    will you send a new prescription for the increased dosage to my pharmacy.  Thank you, Joanie pickett 51      ----- Message -----       From:Physician Nikhil Meeks       Sent:3/22/2022  1:36 PM PDT         To:Joanie Arenas    Subject:Joanie Arenas,    1955  RE:  Repatha    I see. Not uncommon to have to increase the dose. Especially where familial hyperlipidemia is concerned. Will increase you to 420mg every two " weeks. Will repeat your lipids in 6 months.  Dr Jeanna Cruz      ----- Message -----       From:Joanie Arenas       Sent:3/21/2022  4:08 PM PDT         To:Physician Nikhil Meeks    Subject:Joanie Arenas,    1955  RE:  [facility]    i started The Repatha 2021          ----- Message -----       From:Physician Nikhil Meeks       Sent:3/16/2022  1:26 AM PDT         To:Joanie Arenas    Subject:nieves Alarcon 1955  RE:  Repatha     Dagoberto,  I suspect your cholesterol will continue to trend down. How long have your been on the repatha every 14 days?  Dr Jeanna Cruz      ----- Message -----       From:Joanie Arenas       Sent:3/13/2022  5:16 PM PDT         To:Physician Nikhil Meeks    Subject:Joanie Arenas,    1955  RE:  Travis Meeks;  I had my blood  tests a little early because I have an appointment with my primary Dr. Watts and he had ordered the same blood tests.      Im disappointed in my Cholesterol results since I've been on Repatha.  My cholesterol actually increased!!?? Why?  I thought Repatha was going to work better.  Do I need a different dosage?  is it worth the extra expense of Repatha if its not working?    Joanie pickett 1955

## 2022-04-11 ENCOUNTER — PATIENT MESSAGE (OUTPATIENT)
Dept: CARDIOLOGY | Facility: MEDICAL CENTER | Age: 67
End: 2022-04-11
Payer: MEDICARE

## 2022-04-11 NOTE — TELEPHONE ENCOUNTER
Joanie Arenas Vlad, M.D. 3 hours ago (11:27 AM)     BD      Hello;  this is Joanie Arenas   1955     I called my insurance as you requested.  They said they will put the new med, in for authorization.  they are telling me this is the 1st they've heard of me needing to change the medication.  I think they are giving me the run around!  they probably will deny it again.  They are playing dumb!  we'll see where this goes.     Joanie Arenas  ----------------------------------------------------  Copy and paste from my chart message

## 2022-04-11 NOTE — TELEPHONE ENCOUNTER
Brianna Wagner, Med Ass't  You; Jessica Salas R.N. 3 days ago         Gary Max,   Patient already has an approved claim for the 140 MG dose, When its the same med just a different dose, we cannot process the new PA until the old dose expires, it cannot be changed within that one year time frame of approval. The only option we have is asking the patient to contact her [employer] and retract the claim, we cannot do this for her, she has to tell them she's no longer taking that dose and sometimes they can boot it out of the system for us. Please let me know if you have any questions.   Thank you,   Brianna Gallagher comment      ---------------------------------------  Called pt [phone]  Relayed recommendation from Brianna NAM regarding PA for Repatha. Pt will call insurance as stated above. Pt will send my chart message regarding response from insurance. Pt verbalized understanding and is appreciative of call.

## 2022-04-11 NOTE — TELEPHONE ENCOUNTER
Per Brianna kwong for increased Repatha approved.   Sent pt my chart message and advised pt to call pharmacy for  of medication.

## 2022-04-12 RX ORDER — EVOLOCUMAB 420 MG/3.5
420 KIT SUBCUTANEOUS
Qty: 21.6 ML | Refills: 3 | Status: SHIPPED | OUTPATIENT
Start: 2022-04-12 | End: 2022-05-09 | Stop reason: SDUPTHER

## 2022-04-20 NOTE — PATIENT COMMUNICATION
Called [facility] pharmacy [phone]  S/w Trish. Per Trish she has tried to run the Rx several times, every time its rejected stating PA required.     ---------------------------  Called pt [phone]  Pt states she has letter of PA approval for the 420mg Repatha. Pt will fax over approval letter from [facility] to [phone].

## 2022-04-21 ENCOUNTER — TELEPHONE (OUTPATIENT)
Dept: CARDIOLOGY | Facility: MEDICAL CENTER | Age: 67
End: 2022-04-21
Payer: MEDICARE

## 2022-04-21 NOTE — TELEPHONE ENCOUNTER
Called patients [facility] pharmacy. Spoke to Collette. Able to push approval letter through for the Evolocumab with Infusor (REPATHA PUSHTRONEX SYSTEM) 420 MG/3.5ML Solution Cartridge    Patients co-pay is $97 dollars. Dose not available at pharmacy yet, they will order it and call patient to let her know when it is ready for .     Called patient and LVM with info above. Encouraged CB if anything else is needed.

## 2022-04-21 NOTE — TELEPHONE ENCOUNTER
----- Message from Winter Bruno R.N. sent at 2022  3:47 PM PDT -----  Regarding: FW: Authorization for  Higher  dosage Repatha  Forwarding to you per our conversation today.  Thank you    ----- Message -----  From: Joanie Arenas  Sent: 2022   9:30 AM PDT  To: Winter Bruno R.N.  Subject: Authorization for  Higher  dosage Repatha        Good morning Winter;    My insurance sent me a letter saying the prescription was approved.  But......when i went to pharmacy   they couldn't get it to go thru.  I called insurance and they said it was only approved for  (14 for 90 days).  I guess the amount the pharmacy was trying to fill is wrong???  is 14 for 90 days correct? can you inform my  pharmacy?       Thank you,  Joanie Arenas   1955

## 2022-05-09 RX ORDER — EVOLOCUMAB 420 MG/3.5
420 KIT SUBCUTANEOUS
Qty: 3.5 ML | Refills: 6 | Status: SHIPPED | OUTPATIENT
Start: 2022-05-09 | End: 2022-05-12

## 2022-06-14 NOTE — PROGRESS NOTES
Subjective:     CC:  Diagnoses of Essential hypertension, benign, Familial hyperlipidemia, Muscle cramps, Coronary artery calcification seen on computed tomography, Vitamin D insufficiency, Obesity (BMI 30-39.9), Preventative health care, and Encounter to establish care with new doctor were pertinent to this visit.    HISTORY OF THE PRESENT ILLNESS: Patient is a 67 y.o. female. This pleasant patient is here today to establish care and discuss chronic conditions. Her prior PCP was Dr. Salgado.    Problem   Preventative Health Care    Patient is here to establish care today. Feels well overall.    Health Maintenance: reviewed  Vaccines: flu 10/2021, Tdap 07/2015, PPSV completed 07/2020, Shingrix completed 05/2022, Moderna COVID #3 received 11/2021  Colonoscopy 07/2021 normal, on 10-yr recall   DEXA 08/2018 osteopenia with increased fracture risk  Mammogram 01/2021 negative     Coronary Artery Calcification Seen On Computed Tomography    Chronic condition. Calcium score 27 in 2017. Followed by cardiology.  Current regimen: aspirin 81mg daily, Zetia 10mg daily, Repatha 420mg monthly     Obesity (Bmi 30-39.9)    Chronic condition. She tries to eat healthy in general. She does regularly exercise with walking 2-4 miles daily.     Muscle Cramps    Chronic hx of muscle cramps, in calf and feet once to twice a week  Hx of elevated CPK  She tried off atorvastatin which made no difference     Vitamin D Insufficiency    Chronic condition.  Current regimen: vitamin D2 2000 IU daily     Essential Hypertension, Benign    Chronic condition.  Current medication regimen: lisinopril 40mg qd, amlodipine 5mg qd  Patient tolerating and reports compliant with medications. She does not regularly monitor blood pressure at home. Does not need refill of medications today. Denies headache, dizziness, vision changes, chest pain, dyspnea, edema.     Familial Hyperlipidemia    Chronic condition.   Current regimen: Zetia 10mg daily, Repatha 420mg  monthly  She reports compliance and tolerating medication well. Denies musculoskeletal pain, headache, diarrhea. She does not need medication refill today. No acute concerns at this time.           Current Outpatient Medications Ordered in Epic   Medication Sig Dispense Refill   • Evolocumab with Infusor (REPATHA PUSHTRONEX SYSTEM) 420 MG/3.5ML Solution Cartridge Inject 420mg under the skin once per month 3.5 mL 6   • Vitamin D, Ergocalciferol, 50 MCG (2000 UT) Cap Vitamin D     • lisinopril (PRINIVIL) 40 MG tablet Take 1 Tablet by mouth every evening. 90 Tablet 3   • amLODIPine (NORVASC) 5 MG Tab Take 1 Tablet by mouth every evening. 90 Tablet 3   • ezetimibe (ZETIA) 10 MG Tab TAKE 1 TABLET BY MOUTH EVERY DAY 90 tablet 3   • aspirin EC (ECOTRIN) 81 MG Tablet Delayed Response Take 1 tablet by mouth every day. 90 tablet 3   • Biotin 5 MG Cap Take  by mouth.     • coenzyme Q-10 30 MG capsule Take 300 mg by mouth every day.     • nitrofurantoin (MACRODANTIN) 50 MG Cap Take 50 mg by mouth 4 times a day. Per pt takes it about every 2 weeks     • ascorbic acid (ASCORBIC ACID) 500 MG Tab Take 500 mg by mouth every day.     • ibuprofen (MOTRIN) 200 MG Tab Take 200 mg by mouth every 6 hours as needed.       No current Epic-ordered facility-administered medications on file.     Social history  Living situation: lives with  at home, no falls within the past year  Occupation: retired  Marital status:   Alcohol/tobacco/illicit drugs: never smoker, a few drinks a week, denies illicit drugs  Baseline functional status: independent with ADLs    Health Maintenance: reviewed  Vaccines: flu 10/2021, Tdap 07/2015, PPSV completed 07/2020, Shingrix completed 05/2022, Moderna COVID #3 received 11/2021 (undecided on second booster)  Colonoscopy 07/2021 normal, on 10-yr recall   DEXA 08/2018 osteopenia with increased fracture risk  Mammogram 01/2021 negative    ROS:   Gen: no fevers/chills, no changes in weight  Eyes: no  "changes in vision  ENT: no sore throat  Pulm: no sob, no cough  CV: no chest pain, no palpitations  GI: no nausea/vomiting, no diarrhea  : no dysuria  MSk: + chronic leg cramps  Skin: no rash  Neuro: no headaches, no numbness/tingling  Heme/Lymph: no easy bruising      Objective:     Exam: /76 (BP Location: Left arm, Patient Position: Sitting, BP Cuff Size: Adult)   Pulse 66   Temp 37.4 °C (99.3 °F) (Temporal)   Resp 16   Ht 1.64 m (5' 4.57\")   Wt 82.2 kg (181 lb 3.5 oz)   SpO2 96%  Body mass index is 30.56 kg/m².    General: Normal appearing. No distress.  HEENT: Normocephalic. Nasal mucosa benign, oropharynx is without erythema, edema or exudates.   Neck: Supple without JVD or bruit. Thyroid is not enlarged.  Pulmonary: Clear to ausculation. Normal effort. No rales, ronchi, or wheezing.  Cardiovascular: Regular rate and rhythm without murmur. Carotid and radial pulses are intact and equal bilaterally.  Abdomen: Soft, nontender, nondistended. Normal bowel sounds.  Neurologic: Grossly nonfocal  Skin: Warm and dry. No obvious lesions.  Musculoskeletal: Normal gait. No extremity cyanosis, clubbing, or edema.  Psych: Normal mood and affect. Alert and oriented x3. Judgment and insight is normal.    Labs:   Lab Results   Component Value Date/Time    WBC 5.9 03/10/2022 08:25 AM    RBC 4.99 03/10/2022 08:25 AM    HEMOGLOBIN 14.5 03/10/2022 08:25 AM    HEMATOCRIT 44.9 03/10/2022 08:25 AM    MCV 90.0 03/10/2022 08:25 AM    MCH 29.1 03/10/2022 08:25 AM    MCHC 32.3 (L) 03/10/2022 08:25 AM    RDW 43.4 03/10/2022 08:25 AM    PLATELETCT 284 03/10/2022 08:25 AM    MPV 9.5 03/10/2022 08:25 AM      Lab Results   Component Value Date/Time    SODIUM 142 03/10/2022 08:25 AM    POTASSIUM 4.4 03/10/2022 08:25 AM    CHLORIDE 105 03/10/2022 08:25 AM    CO2 25 03/10/2022 08:25 AM    ANION 12.0 03/10/2022 08:25 AM    GLUCOSE 97 03/10/2022 08:25 AM    BUN 13 03/10/2022 08:25 AM    CREATININE 0.63 03/10/2022 08:25 AM    " CREATININE 0.7 05/04/2007 10:30 AM    CALCIUM 9.2 03/10/2022 08:25 AM    ASTSGOT 18 03/10/2022 08:25 AM    ALTSGPT 20 03/10/2022 08:25 AM    TBILIRUBIN 0.5 03/10/2022 08:25 AM    ALBUMIN 4.6 03/10/2022 08:25 AM    TOTPROTEIN 6.4 03/10/2022 08:25 AM    GLOBULIN 1.8 (L) 03/10/2022 08:25 AM    AGRATIO 2.6 03/10/2022 08:25 AM     Lab Results   Component Value Date/Time    HBA1C 5.6 03/10/2022 08:25 AM      Lab Results   Component Value Date/Time    CHOLSTRLTOT 253 (H) 03/10/2022 0825    TRIGLYCERIDE 253 (H) 03/10/2022 0825    HDL 46 03/10/2022 0825     (H) 03/10/2022 0825     Lab Results   Component Value Date/Time    TSHULTRASEN 2.450 03/10/2022 0825     25-Hydroxy   Vitamin D 25 (ng/mL)   Date Value   05/08/2017 32.3   04/25/2016 28.5 (L)   01/12/2015 34.6       Assessment & Plan:   67 y.o. female with the following -    1. Essential hypertension, benign  Chronic condition, stable.  - cont current regimen: lisinopril 40mg qhs, amlodipine 5mg qhs    2. Familial hyperlipidemia  Chronic condition, stable. Followed and managed by cardiology.  - cont current regimen: Zetia 10mg daily, Repatha 420mg monthly    3. Muscle cramps  Chronic condition, persistent. Plan to evaluate for electrolyte abnormality.  - PHOSPHORUS; Future  - MAGNESIUM; Future    4. Coronary artery calcification seen on computed tomography  Chronic condition, stable. Followed by cardiology.  - cont current regimen: aspirin 81mg daily, Zetia 10mg daily, Repatha 420mg monthly    5. Vitamin D insufficiency  Chronic condition, stable.  - cont current regimen: vit D2 2000 IU daily    6. Obesity (BMI 30-39.9)  - Patient identified as having weight management issue.  Appropriate orders and counseling given.    7. Preventative health care  - recommended age appropriate vaccinations  - follow up 1 year for annual physical    8. Encounter to establish care with new doctor      Return in about 1 year (around 6/15/2023) for annual physical.    Please note that  this dictation was created using voice recognition software. I have made every reasonable attempt to correct obvious errors, but I expect that there are errors of grammar and possibly content that I did not discover before finalizing the note.

## 2022-06-15 ENCOUNTER — OFFICE VISIT (OUTPATIENT)
Dept: MEDICAL GROUP | Facility: MEDICAL CENTER | Age: 67
End: 2022-06-15
Payer: MEDICARE

## 2022-06-15 VITALS
BODY MASS INDEX: 30.19 KG/M2 | RESPIRATION RATE: 16 BRPM | WEIGHT: 181.22 LBS | SYSTOLIC BLOOD PRESSURE: 118 MMHG | HEART RATE: 66 BPM | TEMPERATURE: 99.3 F | OXYGEN SATURATION: 96 % | DIASTOLIC BLOOD PRESSURE: 76 MMHG | HEIGHT: 65 IN

## 2022-06-15 DIAGNOSIS — E66.9 OBESITY (BMI 30-39.9): ICD-10-CM

## 2022-06-15 DIAGNOSIS — Z00.00 PREVENTATIVE HEALTH CARE: ICD-10-CM

## 2022-06-15 DIAGNOSIS — E78.49 FAMILIAL HYPERLIPIDEMIA: ICD-10-CM

## 2022-06-15 DIAGNOSIS — I10 ESSENTIAL HYPERTENSION, BENIGN: ICD-10-CM

## 2022-06-15 DIAGNOSIS — R25.2 MUSCLE CRAMPS: ICD-10-CM

## 2022-06-15 DIAGNOSIS — I25.10 CORONARY ARTERY CALCIFICATION SEEN ON COMPUTED TOMOGRAPHY: ICD-10-CM

## 2022-06-15 DIAGNOSIS — E55.9 VITAMIN D INSUFFICIENCY: ICD-10-CM

## 2022-06-15 DIAGNOSIS — Z76.89 ENCOUNTER TO ESTABLISH CARE WITH NEW DOCTOR: ICD-10-CM

## 2022-06-15 PROCEDURE — 99214 OFFICE O/P EST MOD 30 MIN: CPT | Performed by: STUDENT IN AN ORGANIZED HEALTH CARE EDUCATION/TRAINING PROGRAM

## 2022-06-15 ASSESSMENT — FIBROSIS 4 INDEX: FIB4 SCORE: 0.98

## 2022-07-07 ENCOUNTER — HOSPITAL ENCOUNTER (OUTPATIENT)
Dept: LAB | Facility: MEDICAL CENTER | Age: 67
End: 2022-07-07
Attending: STUDENT IN AN ORGANIZED HEALTH CARE EDUCATION/TRAINING PROGRAM
Payer: MEDICARE

## 2022-07-07 DIAGNOSIS — R25.2 MUSCLE CRAMPS: ICD-10-CM

## 2022-07-07 LAB
MAGNESIUM SERPL-MCNC: 2.1 MG/DL (ref 1.5–2.5)
PHOSPHATE SERPL-MCNC: 3.1 MG/DL (ref 2.5–4.5)

## 2022-07-07 PROCEDURE — 36415 COLL VENOUS BLD VENIPUNCTURE: CPT

## 2022-07-07 PROCEDURE — 83735 ASSAY OF MAGNESIUM: CPT

## 2022-07-07 PROCEDURE — 84100 ASSAY OF PHOSPHORUS: CPT

## 2022-07-15 ENCOUNTER — APPOINTMENT (RX ONLY)
Dept: URBAN - METROPOLITAN AREA CLINIC 35 | Facility: CLINIC | Age: 67
Setting detail: DERMATOLOGY
End: 2022-07-15

## 2022-07-15 DIAGNOSIS — L57.3 POIKILODERMA OF CIVATTE: ICD-10-CM

## 2022-07-15 DIAGNOSIS — L82.0 INFLAMED SEBORRHEIC KERATOSIS: ICD-10-CM

## 2022-07-15 DIAGNOSIS — D22 MELANOCYTIC NEVI: ICD-10-CM

## 2022-07-15 DIAGNOSIS — Z71.89 OTHER SPECIFIED COUNSELING: ICD-10-CM

## 2022-07-15 DIAGNOSIS — L82.1 OTHER SEBORRHEIC KERATOSIS: ICD-10-CM

## 2022-07-15 DIAGNOSIS — B07.0 PLANTAR WART: ICD-10-CM

## 2022-07-15 DIAGNOSIS — L81.4 OTHER MELANIN HYPERPIGMENTATION: ICD-10-CM

## 2022-07-15 PROBLEM — D22.5 MELANOCYTIC NEVI OF TRUNK: Status: ACTIVE | Noted: 2022-07-15

## 2022-07-15 PROCEDURE — 99213 OFFICE O/P EST LOW 20 MIN: CPT | Mod: 25

## 2022-07-15 PROCEDURE — 17110 DESTRUCTION B9 LES UP TO 14: CPT

## 2022-07-15 PROCEDURE — ? LIQUID NITROGEN

## 2022-07-15 PROCEDURE — ? COUNSELING

## 2022-07-15 PROCEDURE — ? DEFER

## 2022-07-15 PROCEDURE — ? SUNSCREEN TREATMENT REGIMEN

## 2022-07-15 ASSESSMENT — LOCATION DETAILED DESCRIPTION DERM
LOCATION DETAILED: SUPERIOR THORACIC SPINE
LOCATION DETAILED: RIGHT PROXIMAL PRETIBIAL REGION
LOCATION DETAILED: RIGHT CENTRAL MALAR CHEEK
LOCATION DETAILED: RIGHT INFERIOR ANTERIOR NECK
LOCATION DETAILED: LEFT INFERIOR ANTERIOR NECK
LOCATION DETAILED: LEFT PROXIMAL POSTERIOR UPPER ARM
LOCATION DETAILED: RIGHT MEDIAL PLANTAR HEEL
LOCATION DETAILED: LEFT VENTRAL PROXIMAL FOREARM
LOCATION DETAILED: LEFT LATERAL ABDOMEN
LOCATION DETAILED: LEFT DISTAL PRETIBIAL REGION
LOCATION DETAILED: INFERIOR THORACIC SPINE
LOCATION DETAILED: INFERIOR MID FOREHEAD
LOCATION DETAILED: RIGHT VENTRAL PROXIMAL FOREARM
LOCATION DETAILED: RIGHT PROXIMAL POSTERIOR UPPER ARM

## 2022-07-15 ASSESSMENT — LOCATION ZONE DERM
LOCATION ZONE: FEET
LOCATION ZONE: TRUNK
LOCATION ZONE: NECK
LOCATION ZONE: LEG
LOCATION ZONE: FACE
LOCATION ZONE: ARM

## 2022-07-15 ASSESSMENT — LOCATION SIMPLE DESCRIPTION DERM
LOCATION SIMPLE: ABDOMEN
LOCATION SIMPLE: RIGHT PLANTAR SURFACE
LOCATION SIMPLE: RIGHT PRETIBIAL REGION
LOCATION SIMPLE: RIGHT POSTERIOR UPPER ARM
LOCATION SIMPLE: LEFT PRETIBIAL REGION
LOCATION SIMPLE: LEFT FOREARM
LOCATION SIMPLE: RIGHT ANTERIOR NECK
LOCATION SIMPLE: INFERIOR FOREHEAD
LOCATION SIMPLE: LEFT ANTERIOR NECK
LOCATION SIMPLE: RIGHT CHEEK
LOCATION SIMPLE: LEFT POSTERIOR UPPER ARM
LOCATION SIMPLE: UPPER BACK
LOCATION SIMPLE: RIGHT FOREARM

## 2022-07-15 NOTE — PROCEDURE: LIQUID NITROGEN
Include Z78.9 (Other Specified Conditions Influencing Health Status) As An Associated Diagnosis?: No
Show Topical Anesthesia Variable?: Yes
Consent: The patient's consent was obtained including but not limited to risks of crusting, scabbing, blistering, scarring, darker or lighter pigmentary change, recurrence, incomplete removal and infection.
Medical Necessity Clause: This procedure was medically necessary because the lesions that were treated were:
Post-Care Instructions: I reviewed with the patient in detail post-care instructions. Patient is to wear sunprotection, and avoid picking at any of the treated lesions. Pt may apply Vaseline to crusted or scabbing areas.
Detail Level: Detailed
Medical Necessity Information: It is in your best interest to select a reason for this procedure from the list below. All of these items fulfill various CMS LCD requirements except the new and changing color options.
Spray Paint Text: The liquid nitrogen was applied to the skin utilizing a spray paint frosting technique.

## 2022-08-11 ENCOUNTER — PATIENT MESSAGE (OUTPATIENT)
Dept: CARDIOLOGY | Facility: MEDICAL CENTER | Age: 67
End: 2022-08-11
Payer: MEDICARE

## 2022-08-11 DIAGNOSIS — E78.49 FAMILIAL HYPERLIPIDEMIA: ICD-10-CM

## 2022-08-15 RX ORDER — EZETIMIBE 10 MG/1
10 TABLET ORAL DAILY
Qty: 90 TABLET | Refills: 0 | Status: SHIPPED | OUTPATIENT
Start: 2022-08-15 | End: 2023-01-30

## 2022-08-15 RX ORDER — EVOLOCUMAB 420 MG/3.5
KIT SUBCUTANEOUS
Qty: 3.5 ML | Refills: 3 | Status: SHIPPED | OUTPATIENT
Start: 2022-08-15 | End: 2022-10-17 | Stop reason: SDUPTHER

## 2022-08-22 ENCOUNTER — PATIENT MESSAGE (OUTPATIENT)
Dept: CARDIOLOGY | Facility: MEDICAL CENTER | Age: 67
End: 2022-08-22
Payer: MEDICARE

## 2022-08-23 NOTE — PATIENT COMMUNICATION
Expected dates of lab work extended to 11/23/2022.    Replied to pt via [facility] regarding findings.

## 2022-10-17 ENCOUNTER — HOSPITAL ENCOUNTER (OUTPATIENT)
Dept: LAB | Facility: MEDICAL CENTER | Age: 67
End: 2022-10-17
Attending: INTERNAL MEDICINE
Payer: MEDICARE

## 2022-10-17 DIAGNOSIS — E78.49 FAMILIAL HYPERLIPIDEMIA: ICD-10-CM

## 2022-10-17 LAB
CHOLEST SERPL-MCNC: 241 MG/DL (ref 100–199)
CK SERPL-CCNC: 198 U/L (ref 0–154)
HDLC SERPL-MCNC: 39 MG/DL
LDLC SERPL CALC-MCNC: 138 MG/DL
TRIGL SERPL-MCNC: 318 MG/DL (ref 0–149)

## 2022-10-17 PROCEDURE — 80061 LIPID PANEL: CPT

## 2022-10-17 PROCEDURE — 86038 ANTINUCLEAR ANTIBODIES: CPT

## 2022-10-17 PROCEDURE — 36415 COLL VENOUS BLD VENIPUNCTURE: CPT

## 2022-10-17 PROCEDURE — 82550 ASSAY OF CK (CPK): CPT

## 2022-10-17 RX ORDER — EVOLOCUMAB 420 MG/3.5
KIT SUBCUTANEOUS
Qty: 3.5 ML | Refills: 6 | Status: SHIPPED | OUTPATIENT
Start: 2022-10-17 | End: 2022-11-23 | Stop reason: SDUPTHER

## 2022-10-18 LAB — NUCLEAR IGG SER QL IA: NORMAL

## 2022-11-09 ENCOUNTER — PATIENT MESSAGE (OUTPATIENT)
Dept: HEALTH INFORMATION MANAGEMENT | Facility: OTHER | Age: 67
End: 2022-11-09

## 2022-11-18 ENCOUNTER — TELEPHONE (OUTPATIENT)
Dept: CARDIOLOGY | Facility: MEDICAL CENTER | Age: 67
End: 2022-11-18
Payer: MEDICARE

## 2022-11-18 NOTE — TELEPHONE ENCOUNTER
PA sent to plan    REN SHRESTHA (Key: [id number])  Rx #: [id number]  Repatha Pushtronex System 420MG/3.5ML on-body infusor     Form  Human Electronic PA Form  Created  4 days ago  Sent to Plan  12 minutes ago  Plan Response  12 minutes ago  Submit Clinical Questions  less than a minute ago  Determination  Wait for Determination  Please wait for [facility] 2017 to return a determination.

## 2022-11-23 ENCOUNTER — OFFICE VISIT (OUTPATIENT)
Dept: CARDIOLOGY | Facility: MEDICAL CENTER | Age: 67
End: 2022-11-23
Payer: MEDICARE

## 2022-11-23 VITALS
WEIGHT: 181 LBS | HEART RATE: 81 BPM | BODY MASS INDEX: 30.9 KG/M2 | DIASTOLIC BLOOD PRESSURE: 74 MMHG | RESPIRATION RATE: 14 BRPM | HEIGHT: 64 IN | SYSTOLIC BLOOD PRESSURE: 110 MMHG | OXYGEN SATURATION: 98 %

## 2022-11-23 DIAGNOSIS — E78.49 FAMILIAL HYPERLIPIDEMIA: ICD-10-CM

## 2022-11-23 PROCEDURE — 99214 OFFICE O/P EST MOD 30 MIN: CPT | Performed by: INTERNAL MEDICINE

## 2022-11-23 RX ORDER — EVOLOCUMAB 420 MG/3.5
KIT SUBCUTANEOUS
Qty: 3.5 ML | Refills: 6 | Status: SHIPPED | OUTPATIENT
Start: 2022-11-23 | End: 2023-11-28

## 2022-11-23 RX ORDER — EVOLOCUMAB 420 MG/3.5
KIT SUBCUTANEOUS
Qty: 3.5 ML | Refills: 6 | Status: SHIPPED | OUTPATIENT
Start: 2022-11-23 | End: 2022-11-23 | Stop reason: SDUPTHER

## 2022-11-23 ASSESSMENT — ENCOUNTER SYMPTOMS
BACK PAIN: 0
FLANK PAIN: 0
ABDOMINAL PAIN: 0
ALTERED MENTAL STATUS: 0
DIZZINESS: 0
DIARRHEA: 0
CONSTIPATION: 0
SHORTNESS OF BREATH: 0
CLAUDICATION: 0
COUGH: 0
SYNCOPE: 0
DECREASED APPETITE: 0
PND: 0
BLURRED VISION: 0
HEARTBURN: 0
MUSCLE CRAMPS: 1
IRREGULAR HEARTBEAT: 0
PALPITATIONS: 0
DYSPNEA ON EXERTION: 0
WEIGHT LOSS: 0
FEVER: 0
NEAR-SYNCOPE: 0
DEPRESSION: 0
NAUSEA: 0
WEIGHT GAIN: 0
VOMITING: 0
ORTHOPNEA: 0

## 2022-11-23 ASSESSMENT — FIBROSIS 4 INDEX: FIB4 SCORE: 0.98

## 2022-11-23 NOTE — PROGRESS NOTES
Cardiology Note    Chief Complaint   Patient presents with    Hyperlipidemia    Hypertension         History of Present Illness: Joanie Arenas is a 67 y.o. female PMH familial HLD ( in 2015), CAC on CT, HTN who presents for follow up.    This visit describes her insurance did not approve twice monthly repatha for familial hyperlipidemia. She has tried monthly without much improvement in lipids. Now has failed this regimen. No active cardiac complaints. Sometimes lateral chest pain, sharp, improves with repositioning her bra. Reminds her family is screened and are taking statins. Presents with her daughter today. Her CPK remains elevated and pending discussion with PCP.     Review of Systems   Constitutional: Negative for decreased appetite, fever, malaise/fatigue, weight gain and weight loss.   HENT:  Negative for congestion and nosebleeds.    Eyes:  Negative for blurred vision.   Cardiovascular:  Negative for chest pain, claudication, dyspnea on exertion, irregular heartbeat, leg swelling, near-syncope, orthopnea, palpitations, paroxysmal nocturnal dyspnea and syncope.   Respiratory:  Negative for cough and shortness of breath.    Endocrine: Negative for cold intolerance and heat intolerance.   Skin:  Negative for rash.   Musculoskeletal:  Positive for muscle cramps. Negative for back pain.   Gastrointestinal:  Negative for abdominal pain, constipation, diarrhea, heartburn, melena, nausea and vomiting.   Genitourinary:  Negative for dysuria, flank pain and hematuria.   Neurological:  Negative for dizziness.   Psychiatric/Behavioral:  Negative for altered mental status and depression.        Past Medical History:   Diagnosis Date    Arthritis 2014    hands and back    Benign hypertension 2014    well controlled on meds pt states increased with anxiety    Cold 2014 01/20 end of cold symptoms    Hypercholesteremia     Pain 2014    6/10         Past Surgical History:   Procedure Laterality Date     FUSION, SPINE, LUMBAR, PLIF  2014    Performed by Maci Babb M.D. at SURGERY [facility]    LUMBAR LAMINECTOMY DISKECTOMY  2014    Performed by Maci Babb M.D. at SURGERY [facility]    LUMBAR DECOMPRESSION  2014    Performed by Maci Babb M.D. at SURGERY [facility]    HYSTERECTOMY, TOTAL ABDOMINAL      TONSILLECTOMY           Current Outpatient Medications   Medication Sig Dispense Refill    Evolocumab with Infusor (REPATHA PUSHTRONEX SYSTEM) 420 MG/3.5ML Solution Cartridge Inject 420mg under the skin every two weeks 3.5 mL 6    ezetimibe (ZETIA) 10 MG Tab Take 1 Tablet by mouth every day. NEEDS TO BE SEEN FOR FURTHER REFILLS. 90 Tablet 0    Vitamin D, Ergocalciferol, 50 MCG (2000 UT) Cap Vitamin D      lisinopril (PRINIVIL) 40 MG tablet Take 1 Tablet by mouth every evening. 90 Tablet 3    amLODIPine (NORVASC) 5 MG Tab Take 1 Tablet by mouth every evening. 90 Tablet 3    Biotin 5 MG Cap Take  by mouth.      coenzyme Q-10 30 MG capsule Take 300 mg by mouth every day.      aspirin EC (ECOTRIN) 81 MG Tablet Delayed Response Take 1 tablet by mouth every day. 90 tablet 3    nitrofurantoin (MACRODANTIN) 50 MG Cap Take 50 mg by mouth 4 times a day. Per pt takes it about every 2 weeks      ascorbic acid (ASCORBIC ACID) 500 MG Tab Take 500 mg by mouth every day.      ibuprofen (MOTRIN) 200 MG Tab Take 200 mg by mouth every 6 hours as needed.       No current facility-administered medications for this visit.         No Known Allergies      Family History   Problem Relation Age of Onset    Heart Disease Father          from MI at age 32    Hyperlipidemia Father     GI Disease Mother         GERD    Hypertension Mother     Hyperlipidemia Daughter     Hyperlipidemia Grandchild          Social History     Socioeconomic History    Marital status:      Spouse name: Not on file    Number of children: Not on file    Years of education: Not on file    Highest education level:  "Not on file   Occupational History    Not on file   Tobacco Use    Smoking status: Never    Smokeless tobacco: Never   Vaping Use    Vaping Use: Never used   Substance and Sexual Activity    Alcohol use: Yes     Alcohol/week: 1.8 oz     Types: 3 Cans of beer per week     Comment: 2-3 per week    Drug use: No    Sexual activity: Yes     Partners: Male   Other Topics Concern    Not on file   Social History Narrative    Not on file     Social Determinants of Health     Financial Resource Strain: Not on file   Food Insecurity: Not on file   Transportation Needs: Not on file   Physical Activity: Not on file   Stress: Not on file   Social Connections: Not on file   Intimate Partner Violence: Not on file   Housing Stability: Not on file         Physical Exam:  Ambulatory Vitals  /74 (BP Location: Left arm, Patient Position: Sitting, BP Cuff Size: Adult)   Pulse 81   Resp 14   Ht 1.626 m (5' 4\")   Wt 82.1 kg (181 lb)   SpO2 98%    BP Readings from Last 4 Encounters:   11/23/22 110/74   06/15/22 118/76   03/15/22 124/78   10/13/21 114/78     Weight/BMI:   Vitals:    11/23/22 1500   BP: 110/74   Weight: 82.1 kg (181 lb)   Height: 1.626 m (5' 4\")    Body mass index is 31.07 kg/m².  Wt Readings from Last 4 Encounters:   11/23/22 82.1 kg (181 lb)   06/15/22 82.2 kg (181 lb 3.5 oz)   03/15/22 81 kg (178 lb 9.2 oz)   10/13/21 82.6 kg (182 lb)       Physical Exam  Constitutional:       General: She is not in acute distress.  HENT:      Head: Normocephalic and atraumatic.   Eyes:      Conjunctiva/sclera: Conjunctivae normal.      Pupils: Pupils are equal, round, and reactive to light.   Neck:      Vascular: No JVD.   Cardiovascular:      Rate and Rhythm: Normal rate and regular rhythm.      Heart sounds: Normal heart sounds. No murmur heard.    No friction rub. No gallop.   Pulmonary:      Effort: Pulmonary effort is normal. No respiratory distress.      Breath sounds: Normal breath sounds. No wheezing or rales.   Chest: "      Chest wall: No tenderness.   Abdominal:      General: Bowel sounds are normal. There is no distension.      Palpations: Abdomen is soft.   Musculoskeletal:      Cervical back: Normal range of motion and neck supple.   Skin:     General: Skin is warm and dry.   Neurological:      Mental Status: She is alert and oriented to person, place, and time.   Psychiatric:         Mood and Affect: Affect normal.         Judgment: Judgment normal.       Lab Data Review:  Lab Results   Component Value Date/Time    CHOLSTRLTOT 241 (H) 10/17/2022 08:22 AM     (H) 10/17/2022 08:22 AM    HDL 39 (A) 10/17/2022 08:22 AM    TRIGLYCERIDE 318 (H) 10/17/2022 08:22 AM       Lab Results   Component Value Date/Time    SODIUM 142 03/10/2022 08:25 AM    POTASSIUM 4.4 03/10/2022 08:25 AM    CHLORIDE 105 03/10/2022 08:25 AM    CO2 25 03/10/2022 08:25 AM    GLUCOSE 97 03/10/2022 08:25 AM    BUN 13 03/10/2022 08:25 AM    CREATININE 0.63 03/10/2022 08:25 AM    CREATININE 0.7 05/04/2007 10:30 AM    BUNCREATRAT 21 05/08/2017 08:27 AM     CrCl cannot be calculated (Patient's most recent lab result is older than the maximum 7 days allowed.).  Lab Results   Component Value Date/Time    ALKPHOSPHAT 93 03/10/2022 08:25 AM    ASTSGOT 18 03/10/2022 08:25 AM    ALTSGPT 20 03/10/2022 08:25 AM    TBILIRUBIN 0.5 03/10/2022 08:25 AM      Lab Results   Component Value Date/Time    WBC 5.9 03/10/2022 08:25 AM     Lab Results   Component Value Date/Time    HBA1C 5.6 03/10/2022 08:25 AM     No components found for: TROP      Cardiac Imaging and Procedures Review:      TTE 7/2012  Left Ventricle   Normal left ventricular size and function. Normal left ventricular wall   thickness. Normal left ventricular systolic function. Grade II   diastolic dysfunction is suggested by inflow Doppler and TDI but LA   dimension is normal. Left ventricular ejection fraction is 60% to 65%.   Normal regional wall motion.   CONCLUSIONS   Normal left ventricular size and  function.   Very mild aortic valve leaflet thickening without significant stenosis   or regurgitation.   Trace mitral regurgitation.     Stress echo 2012  CONCLUSIONS   Normal stress echocardiogram.     CAC CT 2017  Coronary calcification:  LMA - 0.0  LCX - 0.0  LAD - 17.4  RCA - 9.6  PDA - 0.0  Calcium score:  27.0    Medical Decision Making:  Problem List Items Addressed This Visit       Familial hyperlipidemia    Relevant Medications    Evolocumab with Infusor (REPATHA PUSHTRONEX SYSTEM) 420 MG/3.5ML Solution Cartridge    Other Relevant Orders    Lipid Profile    CT-CARDIAC SCORING    Referral to Pharmacotherapy Service       HTN - goal 120/80. Controlled. Continue regimen.    Familial HLD - Titrate repatha and zetia. Repeat lipids in 3 months.     CAC on CT - repeat study. If agatston remains <100 reconsider aspirin. Continue pcsk9 inhibitor.     It was my pleasure to meet with Ms. Arenas.

## 2022-11-30 ENCOUNTER — TELEPHONE (OUTPATIENT)
Dept: VASCULAR LAB | Facility: MEDICAL CENTER | Age: 67
End: 2022-11-30
Payer: MEDICARE

## 2022-11-30 NOTE — TELEPHONE ENCOUNTER
[facility]    Received lipid referral from Dr. Meeks on 11/23    1st call  Lm to establish care    Insurance: Medicare  PCP: [facility]  Locations to be seen: [facility]    [facility]armac[facility] at [phone], fax [phone]    Lindy Mejia, PharmD

## 2022-12-02 ENCOUNTER — TELEPHONE (OUTPATIENT)
Dept: CARDIOLOGY | Facility: MEDICAL CENTER | Age: 67
End: 2022-12-02
Payer: MEDICARE

## 2022-12-03 NOTE — TELEPHONE ENCOUNTER
PA sent to kimberlee SHRESTHA (Key: [id number])  Repatha SureClick 140MG/ML auto-injectors     Form  [employer] Electronic PA Form  Created  4 hours ago  Sent to Plan  10 minutes ago  Plan Response  10 minutes ago  Submit Clinical Questions  less than a minute ago  Determination

## 2022-12-03 NOTE — TELEPHONE ENCOUNTER
"PA Started    REN SHRESTHA (Key: [id number])  Repatha SureClick 140MG/ML auto-injectors     Form  [employer] Electronic PA Form  Created  4 hours ago  Sent to Plan  Determination  Request Not Sent  This PA has been accessed, but not sent to the plan. Please fill out the required fields below and click \"Send to Plan.\"  "

## 2022-12-06 ENCOUNTER — HOSPITAL ENCOUNTER (OUTPATIENT)
Dept: RADIOLOGY | Facility: MEDICAL CENTER | Age: 67
End: 2022-12-06
Attending: INTERNAL MEDICINE
Payer: COMMERCIAL

## 2022-12-06 DIAGNOSIS — E78.49 FAMILIAL HYPERLIPIDEMIA: ICD-10-CM

## 2022-12-06 PROCEDURE — 4410556 CT-CARDIAC SCORING (SELF PAY ONLY)

## 2022-12-07 ENCOUNTER — DOCUMENTATION (OUTPATIENT)
Dept: VASCULAR LAB | Facility: MEDICAL CENTER | Age: 67
End: 2022-12-07
Payer: MEDICARE

## 2022-12-07 NOTE — PROGRESS NOTES
[facility]    Received pharmacotherapy referral for lipids  from Dr. Meeks on 11-23-22.    LM with patient to c/b to establish care.    Insurance: Medicare  PCP: [facility]   Locations to be seen: Any    [facility] Anticoagulation/Pharmacotherapy Clinic at [phone], fax [phone]    Efren Higuera, ShanaeD, BCACP

## 2022-12-08 NOTE — TELEPHONE ENCOUNTER
PA approved    REN SHRESTHA (Key: [id number])  Repatha SureClick 140MG/ML auto-injectors     Form  [employer] Electronic PA Form  Created  5 days ago  Sent to Plan  5 days ago  Plan Response  5 days ago  Submit Clinical Questions  5 days ago  Determination  Favorable  5 days ago  Message from Plan  PA Case: [id number], Status: Approved, Coverage Starts on: 1/1/2022 12:00:00 AM, Coverage Ends on: 12/31/2023 12:00:00 AM. Questions? Contact [phone].

## 2022-12-14 ENCOUNTER — PATIENT MESSAGE (OUTPATIENT)
Dept: CARDIOLOGY | Facility: MEDICAL CENTER | Age: 67
End: 2022-12-14
Payer: MEDICARE

## 2022-12-14 ENCOUNTER — DOCUMENTATION (OUTPATIENT)
Dept: VASCULAR LAB | Facility: MEDICAL CENTER | Age: 67
End: 2022-12-14
Payer: MEDICARE

## 2022-12-14 NOTE — PROGRESS NOTES
[facility] Pharmac[facility]    Received pharmacotherapy referral for lipids  from Dr. Meeks on 11-23-22.    S/w patient regarding referral.  NP appt made for 1-3-23.    Insurance: Medicare  PCP: non-[facility]  Locations to be seen: [facility] Anticoagulation/Pharmacotherapy Clinic at [phone], fax [phone]    Efren Higuera, PharmD, BCACP

## 2022-12-18 ENCOUNTER — PATIENT MESSAGE (OUTPATIENT)
Dept: CARDIOLOGY | Facility: MEDICAL CENTER | Age: 67
End: 2022-12-18
Payer: MEDICARE

## 2022-12-19 ENCOUNTER — PATIENT MESSAGE (OUTPATIENT)
Dept: CARDIOLOGY | Facility: MEDICAL CENTER | Age: 67
End: 2022-12-19
Payer: MEDICARE

## 2023-01-03 ENCOUNTER — NON-PROVIDER VISIT (OUTPATIENT)
Dept: VASCULAR LAB | Facility: MEDICAL CENTER | Age: 68
End: 2023-01-03
Attending: INTERNAL MEDICINE
Payer: MEDICARE

## 2023-01-03 VITALS — DIASTOLIC BLOOD PRESSURE: 75 MMHG | SYSTOLIC BLOOD PRESSURE: 139 MMHG | HEART RATE: 76 BPM

## 2023-01-03 DIAGNOSIS — E78.49 FAMILIAL HYPERLIPIDEMIA: ICD-10-CM

## 2023-01-03 PROCEDURE — 99213 OFFICE O/P EST LOW 20 MIN: CPT

## 2023-01-03 NOTE — PROGRESS NOTES
[facility] - Initial Visit  Date of Service: 01/03/23    Joanie Arenas has been referred for evaluation and management of dyslipidemia    Referral Source: Dr. Nikhil Meeks    Subjective    HPI  History of ASCVD: No  Other Established (non-atherosclerotic) Vascular Disease, if Present: None  Age at Initial Diagnosis of Dyslipidemia: late 20s    Current Prescription Lipid Lowering Medications - including dose:   Statin: None  Non-Statin: Ezetimibe 10mg QD   Patient was also on Repatha 420mg Qmonth, but LDL levels remained elevated and Dr. Meeks tried to increase her dose to 420mg TWICE monthly, but insurance will NOT cover this so she had been off this medication since October and has only been taking Ezetimibe  Current Lipid Lowering and Related Supplements:   Vit D  Any Current Side Effects Potentially Related to Lipid Lowering therapy?   No  Current Adherence to Lipid Lowering Therapies   Partial  Previously Attempted Interventions for Lipids - including outcome  Statin: Rosuvastatin, Atorvastatin, Pravastatin- she thinks she has tried them all since she has been on statin for at least 30+ years   Outcome:  having leg cramps, and taken off statins, but leg cramps persisted. Not true intolerance? Unsure, but CK values elevated so she remained off of statin  Non-Statin: None   Outcome: N/A  Any Previous History of Statin Intolerance?   Yes, Details: ptatient has been on statins for 30+ years, but   Baseline Lipids Prior to Treatment on Statin alone (on record, but Dr. Meeks states her peak LDL   Latest Reference Range & Units 06/20/18 10:19   Cholesterol,Tot 100 - 199 mg/dL 274 (H)   Triglycerides 0 - 149 mg/dL 133   HDL >=40 mg/dL 52   LDL <100 mg/dL 195 (H)   However, the patient has been on Repatha 140mg Q14d and Ezetimibe 10mg QD since Sept 2021 and those labs are below:    Latest Reference Range & Units 09/08/21 08:04   Cholesterol,Tot 100 - 199 mg/dL 238 (H)   Triglycerides 0 - 149  mg/dL 214 (H)   HDL >=40 mg/dL 47   LDL <100 mg/dL 148 (H)   (H): Data is abnormally high      Other Pertinent History & CV risk factors: Patient has elevated CAC score   Coronary calcification:  (previous CAC score 27 in 2017)  LMA - 26.3  LCX - 17.9  LAD - 96.0  RCA - 82.4  PDA - 0.0     Total Calcium Score: 222.7      PAST MEDICAL HISTORY:  has a past medical history of Arthritis (), Benign hypertension (), Cold (), Hypercholesteremia, and Pain ().    PAST SURGICAL HISTORY:  has a past surgical history that includes hysterectomy, total abdominal; tonsillectomy; fusion, spine, lumbar, plif (2014); lumbar laminectomy diskectomy (2014); and lumbar decompression (2014).    CURRENT MEDICATIONS:   Current Outpatient Medications:     Bempedoic Acid (NEXLETOL PO), 180 mg, Oral, DAILY, Taking    Repatha Pushtronex System, Inject 420mg under the skin every two weeks (Patient taking differently: 420 mg, Inject 420mg under the skin once a month), Taking Differently    ezetimibe, 10 mg, Oral, DAILY, Taking    Vitamin D (Ergocalciferol), Vitamin D, Taking    lisinopril, 40 mg, Oral, Q EVENING, Taking    amLODIPine, 5 mg, Oral, Q EVENING, Taking    Biotin, Take  by mouth., Taking    coenzyme Q-10, 300 mg, Oral, DAILY, Taking    aspirin EC, 81 mg, Oral, DAILY, Taking    nitrofurantoin, 50 mg, Oral, 4XDAY, PRN    ascorbic acid, 500 mg, Oral, DAILY, Taking    ibuprofen, 200 mg, Oral, Q6HRS PRN, PRN    ALLERGIES: Patient has no known allergies.    FAMILY HISTORY: Father  of MI at 32yrs.  Both her daughter and grandson has FH and are treated with statins.     SOCIAL HISTORY   Social History     Tobacco Use   Smoking Status Never   Smokeless Tobacco Never     Change in weight: Stable  Exercise habits: minimal exercise- pt walks every other day about 2 miles with her dogs (weather permiting)  Diet: common adult  Patient enjoys meat and sweets      Objective      Vitals:    23 1439   BP: 139/75    Pulse: 76      Physical Exam    DATA REVIEW:  Most Recent Lipid Panel:   Lab Results   Component Value Date    CHOLSTRLTOT 241 (H) 10/17/2022    TRIGLYCERIDE 318 (H) 10/17/2022    HDL 39 (A) 10/17/2022     (H) 10/17/2022       Other Pertinent Blood Work:   Lab Results   Component Value Date    SODIUM 142 03/10/2022    POTASSIUM 4.4 03/10/2022    CHLORIDE 105 03/10/2022    CO2 25 03/10/2022    ANION 12.0 03/10/2022    GLUCOSE 97 03/10/2022    BUN 13 03/10/2022    CREATININE 0.63 03/10/2022    CALCIUM 9.2 03/10/2022    ASTSGOT 18 03/10/2022    ALTSGPT 20 03/10/2022    ALKPHOSPHAT 93 03/10/2022    TBILIRUBIN 0.5 03/10/2022    ALBUMIN 4.6 03/10/2022    AGRATIO 2.6 03/10/2022    TSHULTRASEN 2.450 03/10/2022    CPKTOTAL 198 (H) 10/17/2022       Other: NA    Recent Imaging Studies:    Recent imaging studies, including CAC, were available in EMR and reviewed with patient at today's visit        ASSESSMENT AND PLAN  Patient Type, check all that apply:   Primary Prevention  Established Atherosclerotic Cardiovascular Disease (ASCVD)  Yes, Details: pt has elevated CAC score of 222.7  and HTN  Other Established (non-atherosclerotic) Vascular Disease, if Present:  None  Evidence of Heterozygous Familial Hypercholesterolemia (FH):   Yes,   ACC/AHA Indication for Statin Therapy, alma all that apply:  Established ASCVD: Indication for High intensity statin   Calculated Risk for ASCVD, if applicable    N/A  Other Significant Risk Markers, if any, alma all that apply   elevated coronary calcium score and Family history of premature ASCVD in first degree relative  Goal LDL-C and nonHDL-C based on Clinic Protocol  LDL-C <100 or could be argued to be <70.  Lifestyle Recommendations From Today’s Visit:    Eating Plan: Concentrate on  Low sat/Trans fat, Low simple carb, and DASH/Med Style diet and Exercise: continue walking and increase time and intensity as tolerated  Statin Therapy Recommendations from Today’s Visit:  "  None  Non-Statin Medications Recommendations from Today’s Visit:   Patient to begin taking Nexlitol 180mg QD   And continue Ezetimibe 10mg QD and Repatha 420mg Qmonth for now  Indication for PCSK9 Inhibitor, if applicable:  FH with suboptimal control of LDL-C despite maximally tolerated statin  Supplements Recommended at this visit:   Continue with Vit D 2000 units   Recommendations for Other Cardiovascular Risk Factors, alma all that apply:   Hypertension- continue to keep BP well controlled <130/70      Other Issues:  Patient presents to LIPID clinic today for her initial visit.   She has PMH of FH with strong Family hx.   She was referred by Dr. Nikhil Meeks who recently tried to get the patient on Repatha 420mg twice monthly since her LDL was still not at goal of <100 but could be argued as goal of <70.  (Per package insert:  \" may increase to 420 mg once every 2 weeks if clinically meaningful response is not achieved in 12 weeks\")   However, the patient's insurance did not approve this change to her regimen and he referred the patient to our clinic.   The insurance could have rejected claim because the patient has technically only been on Repatha 420mg Q30d for about 8 weeks instead of the recommended 12 weeks (although she had been on Repatha 140mg Q14d for several months prior to the dose change to Repatha 420mg Q 30days)   We can re-attempt this dosing at a later time if still recommended or if the current alternative regimen does not get the patient to goal LDL.     With mutual decision making, the patient declined a retrial with another statin (I suggested Pravastatin 40mg as an add on to current regimen). We agreed to try adding on Nexlitol 180mg QD. (Samples given to patient today to ensure she can tolerate medication).    Patient will get labs done tomorrow for a new baseline since she has been off of Repatha now since October. She will restart Repatha 420mg Q 30d, continue with Ezetimibe 10mg QD " and will also begin add on of Nexlitol 180mg QD.    She will redraw labs again in 8 weeks to see if we are able to get close to goal and if not will see if we can reapply for PA for Repatha 420mg Q14 days as originally prescribed by Dr. Meeks after 12 weeks.           Studies Ordered at Todays Visit: None  Blood Work Ordered At Today’s visit: lipid panel and Lipo(A) to further stratify need for intensity of treatment   Follow-Up:  1 week over the phone after labs and then again in clinic in about 8 weeks with sary Rodriguez  PharmD      CC:  Jason K Wong, D.O. Radulescu, Vlad, M.D. Michael Bloch, M.D.   Lindy Mejia PharmD

## 2023-01-04 ENCOUNTER — HOSPITAL ENCOUNTER (OUTPATIENT)
Dept: LAB | Facility: MEDICAL CENTER | Age: 68
End: 2023-01-04
Attending: INTERNAL MEDICINE
Payer: MEDICARE

## 2023-01-04 DIAGNOSIS — E78.49 FAMILIAL HYPERLIPIDEMIA: ICD-10-CM

## 2023-01-04 LAB
CHOLEST SERPL-MCNC: 283 MG/DL (ref 100–199)
FASTING STATUS PATIENT QL REPORTED: NORMAL
HDLC SERPL-MCNC: 45 MG/DL
LDLC SERPL CALC-MCNC: 198 MG/DL
TRIGL SERPL-MCNC: 200 MG/DL (ref 0–149)

## 2023-01-04 PROCEDURE — 83695 ASSAY OF LIPOPROTEIN(A): CPT

## 2023-01-04 PROCEDURE — 36415 COLL VENOUS BLD VENIPUNCTURE: CPT

## 2023-01-04 PROCEDURE — 80061 LIPID PANEL: CPT

## 2023-01-04 NOTE — PATIENT COMMUNICATION
--------------------------------------------------------------  Route to [facility] per Samira AYERS

## 2023-01-05 LAB — LPA SERPL-MCNC: 9 MG/DL

## 2023-01-23 ENCOUNTER — PATIENT MESSAGE (OUTPATIENT)
Dept: CARDIOLOGY | Facility: MEDICAL CENTER | Age: 68
End: 2023-01-23
Payer: MEDICARE

## 2023-01-23 ENCOUNTER — HOSPITAL ENCOUNTER (EMERGENCY)
Facility: MEDICAL CENTER | Age: 68
End: 2023-01-23
Attending: EMERGENCY MEDICINE
Payer: MEDICARE

## 2023-01-23 ENCOUNTER — APPOINTMENT (OUTPATIENT)
Dept: RADIOLOGY | Facility: MEDICAL CENTER | Age: 68
End: 2023-01-23
Attending: EMERGENCY MEDICINE
Payer: MEDICARE

## 2023-01-23 ENCOUNTER — APPOINTMENT (OUTPATIENT)
Dept: RADIOLOGY | Facility: MEDICAL CENTER | Age: 68
End: 2023-01-23
Payer: MEDICARE

## 2023-01-23 VITALS
RESPIRATION RATE: 20 BRPM | HEIGHT: 64 IN | HEART RATE: 81 BPM | DIASTOLIC BLOOD PRESSURE: 85 MMHG | WEIGHT: 181.88 LBS | TEMPERATURE: 98.6 F | BODY MASS INDEX: 31.05 KG/M2 | SYSTOLIC BLOOD PRESSURE: 147 MMHG | OXYGEN SATURATION: 94 %

## 2023-01-23 DIAGNOSIS — R07.9 CHEST PAIN, UNSPECIFIED TYPE: ICD-10-CM

## 2023-01-23 LAB
ALBUMIN SERPL BCP-MCNC: 5 G/DL (ref 3.2–4.9)
ALBUMIN/GLOB SERPL: 2.2 G/DL
ALP SERPL-CCNC: 85 U/L (ref 30–99)
ALT SERPL-CCNC: 30 U/L (ref 2–50)
ANION GAP SERPL CALC-SCNC: 14 MMOL/L (ref 7–16)
AST SERPL-CCNC: 38 U/L (ref 12–45)
BASOPHILS # BLD AUTO: 0.8 % (ref 0–1.8)
BASOPHILS # BLD: 0.05 K/UL (ref 0–0.12)
BILIRUB SERPL-MCNC: 0.3 MG/DL (ref 0.1–1.5)
BUN SERPL-MCNC: 15 MG/DL (ref 8–22)
CALCIUM ALBUM COR SERPL-MCNC: 8.7 MG/DL (ref 8.5–10.5)
CALCIUM SERPL-MCNC: 9.5 MG/DL (ref 8.5–10.5)
CHLORIDE SERPL-SCNC: 103 MMOL/L (ref 96–112)
CO2 SERPL-SCNC: 25 MMOL/L (ref 20–33)
CREAT SERPL-MCNC: 0.7 MG/DL (ref 0.5–1.4)
D DIMER PPP IA.FEU-MCNC: 0.28 UG/ML (FEU) (ref 0–0.5)
EKG IMPRESSION: NORMAL
EOSINOPHIL # BLD AUTO: 0.07 K/UL (ref 0–0.51)
EOSINOPHIL NFR BLD: 1.2 % (ref 0–6.9)
ERYTHROCYTE [DISTWIDTH] IN BLOOD BY AUTOMATED COUNT: 44.1 FL (ref 35.9–50)
GFR SERPLBLD CREATININE-BSD FMLA CKD-EPI: 93 ML/MIN/1.73 M 2
GLOBULIN SER CALC-MCNC: 2.3 G/DL (ref 1.9–3.5)
GLUCOSE SERPL-MCNC: 90 MG/DL (ref 65–99)
HCT VFR BLD AUTO: 44.4 % (ref 37–47)
HGB BLD-MCNC: 14.6 G/DL (ref 12–16)
IMM GRANULOCYTES # BLD AUTO: 0.06 K/UL (ref 0–0.11)
IMM GRANULOCYTES NFR BLD AUTO: 1 % (ref 0–0.9)
LYMPHOCYTES # BLD AUTO: 1.07 K/UL (ref 1–4.8)
LYMPHOCYTES NFR BLD: 18 % (ref 22–41)
MCH RBC QN AUTO: 29.8 PG (ref 27–33)
MCHC RBC AUTO-ENTMCNC: 32.9 G/DL (ref 33.6–35)
MCV RBC AUTO: 90.6 FL (ref 81.4–97.8)
MONOCYTES # BLD AUTO: 0.73 K/UL (ref 0–0.85)
MONOCYTES NFR BLD AUTO: 12.3 % (ref 0–13.4)
NEUTROPHILS # BLD AUTO: 3.95 K/UL (ref 2–7.15)
NEUTROPHILS NFR BLD: 66.7 % (ref 44–72)
NRBC # BLD AUTO: 0 K/UL
NRBC BLD-RTO: 0 /100 WBC
PLATELET # BLD AUTO: 282 K/UL (ref 164–446)
PMV BLD AUTO: 9.8 FL (ref 9–12.9)
POTASSIUM SERPL-SCNC: 4.2 MMOL/L (ref 3.6–5.5)
PROT SERPL-MCNC: 7.3 G/DL (ref 6–8.2)
RBC # BLD AUTO: 4.9 M/UL (ref 4.2–5.4)
SODIUM SERPL-SCNC: 142 MMOL/L (ref 135–145)
TROPONIN T SERPL-MCNC: 11 NG/L (ref 6–19)
WBC # BLD AUTO: 5.9 K/UL (ref 4.8–10.8)

## 2023-01-23 PROCEDURE — 93005 ELECTROCARDIOGRAM TRACING: CPT | Performed by: EMERGENCY MEDICINE

## 2023-01-23 PROCEDURE — 99284 EMERGENCY DEPT VISIT MOD MDM: CPT

## 2023-01-23 PROCEDURE — 85025 COMPLETE CBC W/AUTO DIFF WBC: CPT

## 2023-01-23 PROCEDURE — 93005 ELECTROCARDIOGRAM TRACING: CPT

## 2023-01-23 PROCEDURE — 84484 ASSAY OF TROPONIN QUANT: CPT

## 2023-01-23 PROCEDURE — 85379 FIBRIN DEGRADATION QUANT: CPT

## 2023-01-23 PROCEDURE — 71045 X-RAY EXAM CHEST 1 VIEW: CPT

## 2023-01-23 PROCEDURE — 36415 COLL VENOUS BLD VENIPUNCTURE: CPT

## 2023-01-23 PROCEDURE — 80053 COMPREHEN METABOLIC PANEL: CPT

## 2023-01-23 ASSESSMENT — FIBROSIS 4 INDEX: FIB4 SCORE: 0.98

## 2023-01-23 NOTE — ED TRIAGE NOTES
"Chief Complaint   Patient presents with    Chest Pain     Left side, Worse with deep inspiration or cough. X 1 month, went away for a week, and came back yesterday.       BP (!) 155/83   Pulse (!) 102   Temp 37.5 °C (99.5 °F) (Temporal)   Resp 18   Ht 1.626 m (5' 4\")   Wt 82.5 kg (181 lb 14.1 oz)   SpO2 97%   BMI 31.22 kg/m²     "

## 2023-01-24 NOTE — DISCHARGE INSTRUCTIONS
You were seen in the Emergency Department for chest pain.    EKG, labs, chest xray were completed without significant acute abnormalities.    Please use 1,000mg of tylenol or 600mg of ibuprofen every 6 hours as needed for pain.  Continue home medications.    Please follow up with your established cardiologist for outpatient stress test.    Return to the Emergency Department with worsening chest pain, trouble breathing, lightheadedness or fainting, or other concerns.

## 2023-01-24 NOTE — ED PROVIDER NOTES
ER Provider Note    Scribed for Megan Amin M.d. by Delaney Cook. 1/23/2023  4:33 PM    Primary Care Provider: Nicolás Hannah D.O.    CHIEF COMPLAINT  Chief Complaint   Patient presents with    Chest Pain     Left side, Worse with deep inspiration or cough. X 1 month, went away for a week, and came back yesterday.     EXTERNAL RECORDS REVIEWED  Outpatient Notes Patient is followed by cardiology for hyperlipidemia and hypertension. She had a recent CT coronary showing a score of 222. Echo in 2017 was normal.      HPI/ROS  LIMITATION TO HISTORY   Select: : None  OUTSIDE HISTORIAN(S):  none    Joanie Arenas is a 67 y.o. female who presents to the ED complaining of chest pain. She locates her pain to the left side of her chest and under her breast.  It has been going on intermittently for the last month.  She describes it as a heaviness that started 1 week ago but resolved before returning today. Patient has associated headache. She has been taking tylenol without alleviation. Denies shortness of breath, fevers, cough, leg swelling, or known injuries. Patient adds that she has occasional leg cramps to the back of her leg that are intermittent and resolve spontaneously. Patient follows with Dr. Meeks and her next follow up is scheduled for March.     PAST MEDICAL HISTORY  Past Medical History:   Diagnosis Date    Arthritis 2014    hands and back    Benign hypertension 2014    well controlled on meds pt states increased with anxiety    Cold 2014 01/20 end of cold symptoms    Hypercholesteremia     Pain 2014    6/10       SURGICAL HISTORY  Past Surgical History:   Procedure Laterality Date    FUSION, SPINE, LUMBAR, PLIF  1/29/2014    Performed by Maci Babb M.D. at [facility]    LUMBAR LAMINECTOMY DISKECTOMY  1/29/2014    Performed by Maci Babb M.D. at [facility]    LUMBAR DECOMPRESSION  1/29/2014    Performed by Maci Babb M.D. at [facility]     "HYSTERECTOMY, TOTAL ABDOMINAL      TONSILLECTOMY         FAMILY HISTORY  Family History   Problem Relation Age of Onset    Heart Disease Father          from MI at age 32    Hyperlipidemia Father     GI Disease Mother         GERD    Hypertension Mother     Hyperlipidemia Daughter     Hyperlipidemia Grandchild        SOCIAL HISTORY   reports that she has never smoked. She has never used smokeless tobacco. She reports current alcohol use of about 1.8 oz per week. She reports that she does not use drugs.    CURRENT MEDICATIONS  Previous Medications    AMLODIPINE (NORVASC) 5 MG TAB    Take 1 Tablet by mouth every evening.    ASCORBIC ACID (ASCORBIC ACID) 500 MG TAB    Take 500 mg by mouth every day.    ASPIRIN EC (ECOTRIN) 81 MG TABLET DELAYED RESPONSE    Take 1 tablet by mouth every day.    BEMPEDOIC ACID (NEXLETOL PO)    Take 180 mg by mouth every day.    BIOTIN 5 MG CAP    Take  by mouth.    COENZYME Q-10 30 MG CAPSULE    Take 300 mg by mouth every day.    EVOLOCUMAB WITH INFUSOR (REPATHA PUSHTRONEX SYSTEM) 420 MG/3.5ML SOLUTION CARTRIDGE    Inject 420mg under the skin every two weeks    EZETIMIBE (ZETIA) 10 MG TAB    Take 1 Tablet by mouth every day. NEEDS TO BE SEEN FOR FURTHER REFILLS.    IBUPROFEN (MOTRIN) 200 MG TAB    Take 200 mg by mouth every 6 hours as needed.    LISINOPRIL (PRINIVIL) 40 MG TABLET    Take 1 Tablet by mouth every evening.    NITROFURANTOIN (MACRODANTIN) 50 MG CAP    Take 50 mg by mouth 4 times a day. Per pt takes it about every 2 weeks    VITAMIN D, ERGOCALCIFEROL, 50 MCG ( UT) CAP    Vitamin D       ALLERGIES  Patient has no known allergies.    PHYSICAL EXAM  BP (!) 155/83   Pulse (!) 102   Temp 37.5 °C (99.5 °F) (Temporal)   Resp 18   Ht 1.626 m (5' 4\")   Wt 82.5 kg (181 lb 14.1 oz)   SpO2 97%   BMI 31.22 kg/m²   Constitutional: Nontoxic appearing. Alert in no apparent distress.  HENT: Normocephalic, Atraumatic. Bilateral external ears normal. Nose normal.  Moist mucous " membranes.  Oropharynx clear.  Eyes: Pupils are equal and reactive. Conjunctiva normal.   Neck: Supple, full range of motion  Heart: Regular rate and rhythm.  No murmurs.    Lungs: No respiratory distress, normal work of breathing. Lungs clear to auscultation bilaterally.  Abdomen Soft, no distention.  No tenderness to palpation.  Musculoskeletal: Atraumatic. No obvious deformities noted.  No lower extremity edema.  Skin: Warm, Dry.  No erythema, No rash.   Neurologic: Alert and oriented x3. Moving all extremities spontaneously without focal deficits.  Psychiatric: Affect normal, Mood normal, Appears appropriate and not intoxicated.     DIAGNOSTIC STUDIES    Labs:   Labs Reviewed   CBC WITH DIFFERENTIAL - Abnormal; Notable for the following components:       Result Value    MCHC 32.9 (*)     Lymphocytes 18.00 (*)     Immature Granulocytes 1.00 (*)     All other components within normal limits    Narrative:     Biotin intake of greater than 5 mg per day may interfere with  troponin levels, causing false low values.   COMP METABOLIC PANEL - Abnormal; Notable for the following components:    Albumin 5.0 (*)     All other components within normal limits    Narrative:     Biotin intake of greater than 5 mg per day may interfere with  troponin levels, causing false low values.   TROPONIN    Narrative:     Biotin intake of greater than 5 mg per day may interfere with  troponin levels, causing false low values.   CORRECTED CALCIUM    Narrative:     Biotin intake of greater than 5 mg per day may interfere with  troponin levels, causing false low values.   ESTIMATED GFR    Narrative:     Biotin intake of greater than 5 mg per day may interfere with  troponin levels, causing false low values.   D-DIMER   TROPONIN       EKG:   I have independently interpreted this EKG  Results for orders placed or performed during the hospital encounter of 01/23/23   EKG   Result Value Ref Range    Report       [facility]  Emergency Dept.    Test Date:  2023  Pt Name:    REN SHRESTHA                Department: ER  MRN:        [id number]                      Room:  Gender:     Female                       Technician: [id number]  :        [id number]                   Requested By:ER TRIAGE PROTOCOL  Order #:    [id number]                    Reading MD: Megan Amin MD    Measurements  Intervals                                Axis  Rate:       86                           P:          63  MI:         152                          QRS:        10  QRSD:       91                           T:          32  QT:         370  QTc:        443    Interpretive Statements  Sinus rhythm  Normal intervals, no ectopy  No ST or T wave change  Compared to ECG 2014 10:40:48  No significant changes  Electronically Signed On 2023 16:32:44 PST by Megan Amin MD         Radiology:   The attending emergency physician has independently interpreted the diagnostic imaging associated with this visit and am waiting the final reading from the radiologist.   DX-CHEST-PORTABLE (1 VIEW)   Final Result      Negative single view of the chest.            COURSE & MEDICAL DECISION MAKING     ED Observation Status? Yes; I am placing the patient in to an observation status due to a diagnostic uncertainty as well as therapeutic intensity. Patient placed in observation status at 4:38 PM, 2023.     Observation plan is as follows: chest x-ray, d-dimer, troponin, CBC w/ diff, CMP, troponin, and EKG    Upon Reevaluation, the patient's condition has: Improved; and will be discharged.    Patient discharged from ED Observation status at 5:28 PM (Time) 23 (Date).     INITIAL ASSESSMENT AND PLAN  Care Narrative: Patient with history of hypertension and coronary artery calcification who presents with ongoing intermittent chest pain over the last month however worsening over the last 2 days.  She is hypertensive on arrival with otherwise reassuring vital signs.   EKG does not show evidence of ischemia or arrhythmia.  Troponin is normal in the setting of over a day of constant pain making ACS unlikely.  D-dimer is normal making risk of pulmonary embolism low.  Chest x-ray does not show pulmonary edema or pneumonia.    5:29 PM - Upon reassessment, patient is resting comfortably with normal vital signs.  No new complaints at this time.  Discussed results with patient as well as importance of primary care/cardiology follow up for outpatient stress test.  Patient understands plan of care and strict return precautions for new or changing symptoms.     ADDITIONAL PROBLEM LIST AND DISPOSITION    Problem #1: Chest pain - work-up reassuring, HEART score of 3, patient has established cardiologist for which she can follow-up for outpatient stress      Escalation of care considered, and ultimately not performed: acute inpatient care management, however at this time, the patient is most appropriate for outpatient management.      The patient will return for new or worsening symptoms and is stable at the time of discharge.    DISPOSITION:  Patient will be discharged home in stable condition.    FOLLOW UP:  Nikhil Meeks M.D.  [address] [place] [id number] [phone]    Schedule an appointment as soon as possible for a visit   for outpatient stress test    [facility], Emergency Dept  [address] [facility] [address] [phone]    If symptoms worsen    FINAL DIAGNOSIS  1. Chest pain, unspecified type          I, Delaney Cook (Zane), am scribing for, and in the presence of, Megan Amin M.D..    Electronically signed by: Delaney Cook (Zane), 1/23/2023    IMegan M.D. personally performed the services described in this documentation, as scribed by Delaney Cook in my presence, and it is both accurate and complete.   The note accurately reflects work and decisions made by me.  Megan Amin M.D.  1/23/2023  11:43 PM

## 2023-01-27 ENCOUNTER — PATIENT MESSAGE (OUTPATIENT)
Dept: CARDIOLOGY | Facility: MEDICAL CENTER | Age: 68
End: 2023-01-27
Payer: MEDICARE

## 2023-02-21 ENCOUNTER — HOSPITAL ENCOUNTER (OUTPATIENT)
Dept: CARDIOLOGY | Facility: MEDICAL CENTER | Age: 68
End: 2023-02-21
Attending: INTERNAL MEDICINE
Payer: MEDICARE

## 2023-02-21 DIAGNOSIS — R07.9 CHEST PAIN, UNSPECIFIED TYPE: ICD-10-CM

## 2023-02-21 LAB — LV EJECT FRACT  99904: 65

## 2023-02-21 PROCEDURE — 700117 HCHG RX CONTRAST REV CODE 255: Performed by: INTERNAL MEDICINE

## 2023-02-21 PROCEDURE — 93018 CV STRESS TEST I&R ONLY: CPT | Performed by: INTERNAL MEDICINE

## 2023-02-21 PROCEDURE — 93017 CV STRESS TEST TRACING ONLY: CPT

## 2023-02-21 PROCEDURE — 93350 STRESS TTE ONLY: CPT | Mod: 26 | Performed by: INTERNAL MEDICINE

## 2023-02-21 RX ADMIN — HUMAN ALBUMIN MICROSPHERES AND PERFLUTREN 3 ML: 10; .22 INJECTION, SOLUTION INTRAVENOUS at 08:40

## 2023-02-28 RX ORDER — CARVEDILOL 6.25 MG/1
6.25 TABLET ORAL 2 TIMES DAILY WITH MEALS
Qty: 180 TABLET | Refills: 3 | Status: SHIPPED | OUTPATIENT
Start: 2023-02-28 | End: 2023-03-03 | Stop reason: SDUPTHER

## 2023-03-02 ENCOUNTER — PATIENT MESSAGE (OUTPATIENT)
Dept: CARDIOLOGY | Facility: MEDICAL CENTER | Age: 68
End: 2023-03-02
Payer: MEDICARE

## 2023-03-02 ENCOUNTER — HOSPITAL ENCOUNTER (OUTPATIENT)
Dept: RADIOLOGY | Facility: MEDICAL CENTER | Age: 68
End: 2023-03-02
Attending: NURSE PRACTITIONER
Payer: MEDICARE

## 2023-03-02 DIAGNOSIS — M25.559 PAIN IN JOINT INVOLVING PELVIC REGION AND THIGH, UNSPECIFIED LATERALITY: ICD-10-CM

## 2023-03-02 DIAGNOSIS — M70.71 BURSITIS OF RIGHT HIP, UNSPECIFIED BURSA: ICD-10-CM

## 2023-03-02 PROCEDURE — 73522 X-RAY EXAM HIPS BI 3-4 VIEWS: CPT

## 2023-03-03 RX ORDER — CARVEDILOL 6.25 MG/1
6.25 TABLET ORAL 2 TIMES DAILY WITH MEALS
Qty: 180 TABLET | Refills: 3 | Status: SHIPPED | OUTPATIENT
Start: 2023-03-03 | End: 2024-03-08 | Stop reason: SDUPTHER

## 2023-03-06 ENCOUNTER — HOSPITAL ENCOUNTER (OUTPATIENT)
Dept: LAB | Facility: MEDICAL CENTER | Age: 68
End: 2023-03-06
Attending: INTERNAL MEDICINE
Payer: MEDICARE

## 2023-03-06 ENCOUNTER — HOSPITAL ENCOUNTER (OUTPATIENT)
Dept: RADIOLOGY | Facility: MEDICAL CENTER | Age: 68
End: 2023-03-06
Attending: INTERNAL MEDICINE
Payer: MEDICARE

## 2023-03-06 DIAGNOSIS — Z12.31 ENCOUNTER FOR SCREENING MAMMOGRAM FOR BREAST CANCER: ICD-10-CM

## 2023-03-06 DIAGNOSIS — E78.49 FAMILIAL HYPERLIPIDEMIA: ICD-10-CM

## 2023-03-06 LAB
CHOLEST SERPL-MCNC: 145 MG/DL (ref 100–199)
FASTING STATUS PATIENT QL REPORTED: NORMAL
HDLC SERPL-MCNC: 50 MG/DL
LDLC SERPL CALC-MCNC: 64 MG/DL
TRIGL SERPL-MCNC: 155 MG/DL (ref 0–149)

## 2023-03-06 PROCEDURE — 36415 COLL VENOUS BLD VENIPUNCTURE: CPT

## 2023-03-06 PROCEDURE — 77063 BREAST TOMOSYNTHESIS BI: CPT

## 2023-03-06 PROCEDURE — 80061 LIPID PANEL: CPT

## 2023-03-08 ENCOUNTER — NON-PROVIDER VISIT (OUTPATIENT)
Dept: VASCULAR LAB | Facility: MEDICAL CENTER | Age: 68
End: 2023-03-08
Attending: INTERNAL MEDICINE
Payer: MEDICARE

## 2023-03-08 DIAGNOSIS — E78.49 FAMILIAL HYPERLIPIDEMIA: ICD-10-CM

## 2023-03-08 PROCEDURE — 99212 OFFICE O/P EST SF 10 MIN: CPT | Performed by: PHARMACIST

## 2023-03-08 RX ORDER — BEMPEDOIC ACID AND EZETIMIBE 180; 10 MG/1; MG/1
1 TABLET, FILM COATED ORAL DAILY
Qty: 30 TABLET | Refills: 6 | Status: SHIPPED | OUTPATIENT
Start: 2023-03-08 | End: 2023-10-10

## 2023-03-08 NOTE — PROGRESS NOTES
[facility] - FollowUp Visit  Date of Service: 03/08/23    Joanie Walton Drake is here for follow up of dyslipidemia.    Subjective    HPI  Pertinent Interval History since last visit:   None  Current Prescription Lipid Lowering Medications - including dose:   Statin: None  Non-Statin: Repatha 420mg SQ once per month, Nexletol 180mg QD, Ezetimibe 10mg QD  Current Lipid Lowering and Related Supplements:   Vitamin D  Any Current Side Effects Potentially Related to Lipid Lowering therapy?   No  Current Adherence to Lipid Lowering Therapies:  Complete  Previously Attempted Interventions for Lipids - including outcome  Statin: Rosuvastatin, Atorvastatin, Pravastatin- she thinks she has tried them all since she has been on statin for at least 30+ years              Outcome:  having leg cramps, and taken off statins, but leg cramps persisted. Not true intolerance? Unsure, but CK values elevated so she remained off of statin  Non-Statin: None              Outcome: N/A  Any Previous History of Statin Intolerance?   Yes, Details: ptatient has been on statins for 30+ years, but   Baseline Lipids Prior to Treatment on Statin alone (on record, but Dr. Meeks states her peak LDL    Latest Reference Range & Units 06/20/18 10:19   Cholesterol,Tot 100 - 199 mg/dL 274 (H)   Triglycerides 0 - 149 mg/dL 133   HDL >=40 mg/dL 52   LDL <100 mg/dL 195 (H)   However, the patient has been on Repatha 140mg Q14d and Ezetimibe 10mg QD since Sept 2021 and those labs are below:     Latest Reference Range & Units 09/08/21 08:04   Cholesterol,Tot 100 - 199 mg/dL 238 (H)   Triglycerides 0 - 149 mg/dL 214 (H)   HDL >=40 mg/dL 47   LDL <100 mg/dL 148 (H)   (H): Data is abnormally high     Other Pertinent History & CV risk factors: Patient has elevated CAC score   Coronary calcification:  (previous CAC score 27 in 2017)  LMA - 26.3  LCX - 17.9  LAD - 96.0  RCA - 82.4  PDA - 0.0     Total Calcium Score: 222.7    SOCIAL HISTORY  Social History      Tobacco Use   Smoking Status Never   Smokeless Tobacco Never      Change in weight: Stable  Exercise habits: minimal exercise- pt walks every other day about 2 miles with her dogs (weather permiting)  Diet: common adult  Patient enjoys meat and sweets      Objective    There were no vitals filed for this visit.   Physical Exam    DATA REVIEW  Most Recent Lipid Panel:   Lab Results   Component Value Date    CHOLSTRLTOT 145 03/06/2023    TRIGLYCERIDE 155 (H) 03/06/2023    HDL 50 03/06/2023    LDL 64 03/06/2023       Other Pertinent Blood Work:   Lab Results   Component Value Date    SODIUM 142 01/23/2023    POTASSIUM 4.2 01/23/2023    CHLORIDE 103 01/23/2023    CO2 25 01/23/2023    ANION 14.0 01/23/2023    GLUCOSE 90 01/23/2023    BUN 15 01/23/2023    CREATININE 0.70 01/23/2023    CALCIUM 9.5 01/23/2023    ASTSGOT 38 01/23/2023    ALTSGPT 30 01/23/2023    ALKPHOSPHAT 85 01/23/2023    TBILIRUBIN 0.3 01/23/2023    ALBUMIN 5.0 (H) 01/23/2023    AGRATIO 2.2 01/23/2023    LIPOPROTA 9 01/04/2023    TSHULTRASEN 2.450 03/10/2022    CPKTOTAL 198 (H) 10/17/2022       Other:  NA    Recent Imaging Studies:    Recent imaging studies, including CAC, were available in EMR and reviewed with patient at today's visit          ASSESSMENT AND PLAN  Patient Type, check all that apply:   Primary Prevention  Established Atherosclerotic Cardiovascular Disease (ASCVD)  Yes, Details: pt has elevated CAC score of 222.7  and HTN  Other Established (non-atherosclerotic) Vascular Disease, if Present:  None  Evidence of Heterozygous Familial Hypercholesterolemia (FH):   Yes,   ACC/AHA Indication for Statin Therapy, alma all that apply:  Established ASCVD: Indication for High intensity statin   Calculated Risk for ASCVD, if applicable    N/A  Other Significant Risk Markers, if any, alma all that apply   elevated coronary calcium score and Family history of premature ASCVD in first degree relative  Goal LDL-C and nonHDL-C based on Clinic  Protocol  LDL-C <100 or could be argued to be <70.  Lifestyle Recommendations From Today’s Visit:    Eating Plan: Concentrate on  Low sat/Trans fat, Low simple carb, and DASH/Med Style diet and Exercise: continue walking and increase time and intensity as tolerated  Statin Therapy Recommendations from Today’s Visit:   None  Non-Statin Medications Recommendations from Today’s Visit:   Start combination product Nexlizet 180/10mg QD now that tolerating both  Indication for PCSK9 Inhibitor, if applicable:  FH with suboptimal control of LDL-C despite maximally tolerated statin - Continue with Repatha 420mg SQ once per month  Supplements Recommended at this visit:   Continue with Vit D 2000 units   Recommendations for Other Cardiovascular Risk Factors, alma all that apply:   Hypertension- 100/68 to 129/78  - continue to keep BP well controlled <130/70      Patient seen today for follow up.  Latest lipid panel shows LDL-C and non-HDL-C at goal.  Patient has been dosing Repatha ONCE per month, although she states insurance has now approved for TWICE per month.  Tolerating Nexletol at this time.    For now, will continue with current therapy, Nexlizet combination prescribed to reduce pill burden/copay.  Should Nexlizet become cost prohibitive, will have patient go back to Repatha 420mg TWICE per month along with ezetimibe.  She will contact our office if any issues with cost.    Studies Ordered at Todays Visit:  None   Blood Work Ordered At Today’s visit:   Lipid panel   CMP  Follow-Up:   6 months    Efren Higuera, PharmD, BCACP    CC:  SUZY Reyes Vlad, M.D.

## 2023-03-09 ENCOUNTER — DOCUMENTATION (OUTPATIENT)
Dept: VASCULAR LAB | Facility: MEDICAL CENTER | Age: 68
End: 2023-03-09
Payer: MEDICARE

## 2023-03-09 NOTE — PROGRESS NOTES
Joanie Arenas (Quintanilla: [id number]) - [id number]  Nexlizet 180-10MG tablets  Status: PA Response - Approved    PA Case: [id number], Status: Approved, Coverage Starts on: 1/1/2023 12:00:00 AM, Coverage Ends on: 12/31/2023 12:00:00 AM    Pharmacy: Locked out of [facility]. .

## 2023-03-31 ENCOUNTER — TELEPHONE (OUTPATIENT)
Dept: HEALTH INFORMATION MANAGEMENT | Facility: OTHER | Age: 68
End: 2023-03-31

## 2023-04-17 ENCOUNTER — PATIENT MESSAGE (OUTPATIENT)
Dept: CARDIOLOGY | Facility: MEDICAL CENTER | Age: 68
End: 2023-04-17
Payer: MEDICARE

## 2023-04-17 DIAGNOSIS — I10 ESSENTIAL HYPERTENSION, BENIGN: ICD-10-CM

## 2023-04-19 RX ORDER — AMLODIPINE BESYLATE 5 MG/1
5 TABLET ORAL EVERY EVENING
Qty: 90 TABLET | Refills: 0 | Status: SHIPPED | OUTPATIENT
Start: 2023-04-19 | End: 2023-07-14

## 2023-04-19 RX ORDER — LISINOPRIL 40 MG/1
40 TABLET ORAL EVERY EVENING
Qty: 90 TABLET | Refills: 0 | Status: SHIPPED | OUTPATIENT
Start: 2023-04-19 | End: 2023-07-14

## 2023-05-08 ENCOUNTER — HOSPITAL ENCOUNTER (OUTPATIENT)
Facility: MEDICAL CENTER | Age: 68
End: 2023-05-08
Attending: STUDENT IN AN ORGANIZED HEALTH CARE EDUCATION/TRAINING PROGRAM
Payer: MEDICARE

## 2023-05-08 PROCEDURE — 87086 URINE CULTURE/COLONY COUNT: CPT

## 2023-05-08 PROCEDURE — 87186 SC STD MICRODIL/AGAR DIL: CPT

## 2023-05-08 PROCEDURE — 87077 CULTURE AEROBIC IDENTIFY: CPT

## 2023-05-12 ENCOUNTER — PATIENT MESSAGE (OUTPATIENT)
Dept: CARDIOLOGY | Facility: MEDICAL CENTER | Age: 68
End: 2023-05-12
Payer: MEDICARE

## 2023-05-15 ENCOUNTER — TELEPHONE (OUTPATIENT)
Dept: CARDIOLOGY | Facility: MEDICAL CENTER | Age: 68
End: 2023-05-15
Payer: MEDICARE

## 2023-05-15 NOTE — TELEPHONE ENCOUNTER
VR          Caller: Joanie Arenas      Topic/issue: Patient was calling about her appointment that she has coming up and about blood that she needed to do before the appointment and she was asking for a call back      Callback Number: [phone]      Thank you    -Willy KEENAN

## 2023-05-16 NOTE — TELEPHONE ENCOUNTER
Phone Number Called: [phone]    Call outcome: Spoke to patient regarding message below.    Message: Called to discuss patient concerns with labs. Pt told that labs are up to date VR will likely order labs post appointment if necessary.    TO VR: Please advise if any labs needed urgently prior to appointment  F/U visit 05/19/2023 w/ you

## 2023-05-19 ENCOUNTER — OFFICE VISIT (OUTPATIENT)
Dept: CARDIOLOGY | Facility: MEDICAL CENTER | Age: 68
End: 2023-05-19
Attending: INTERNAL MEDICINE
Payer: MEDICARE

## 2023-05-19 VITALS
OXYGEN SATURATION: 95 % | SYSTOLIC BLOOD PRESSURE: 114 MMHG | HEART RATE: 64 BPM | DIASTOLIC BLOOD PRESSURE: 76 MMHG | RESPIRATION RATE: 18 BRPM | WEIGHT: 181 LBS | HEIGHT: 64 IN | BODY MASS INDEX: 30.9 KG/M2

## 2023-05-19 DIAGNOSIS — I10 ESSENTIAL HYPERTENSION, BENIGN: ICD-10-CM

## 2023-05-19 DIAGNOSIS — E78.49 FAMILIAL HYPERLIPIDEMIA: ICD-10-CM

## 2023-05-19 DIAGNOSIS — Z82.49 FAMILY HISTORY OF EARLY CAD: ICD-10-CM

## 2023-05-19 PROCEDURE — 99214 OFFICE O/P EST MOD 30 MIN: CPT | Performed by: INTERNAL MEDICINE

## 2023-05-19 PROCEDURE — 3074F SYST BP LT 130 MM HG: CPT | Performed by: INTERNAL MEDICINE

## 2023-05-19 PROCEDURE — 3078F DIAST BP <80 MM HG: CPT | Performed by: INTERNAL MEDICINE

## 2023-05-19 PROCEDURE — 99213 OFFICE O/P EST LOW 20 MIN: CPT | Performed by: INTERNAL MEDICINE

## 2023-05-19 ASSESSMENT — ENCOUNTER SYMPTOMS
PALPITATIONS: 0
COUGH: 0
CLAUDICATION: 0
IRREGULAR HEARTBEAT: 0
ALTERED MENTAL STATUS: 0
BACK PAIN: 0
SHORTNESS OF BREATH: 0
ABDOMINAL PAIN: 0
DEPRESSION: 0
NEAR-SYNCOPE: 0
ORTHOPNEA: 0
BLURRED VISION: 0
MUSCLE CRAMPS: 0
WEIGHT GAIN: 0
CONSTIPATION: 0
NAUSEA: 0
HEARTBURN: 0
DIARRHEA: 0
WEIGHT LOSS: 0
PND: 0
FEVER: 0
DECREASED APPETITE: 0
DYSPNEA ON EXERTION: 0
FLANK PAIN: 0
VOMITING: 0
DIZZINESS: 0
SYNCOPE: 0

## 2023-05-19 ASSESSMENT — FIBROSIS 4 INDEX: FIB4 SCORE: 1.72

## 2023-05-19 NOTE — PROGRESS NOTES
Cardiology Note    Chief Complaint   Patient presents with    Hyperlipidemia    Hypertension         History of Present Illness: Joanie Arenas is a 68 y.o. female PMH familial HLD ( in 2015), CAC on CT, HTN who presents for follow up.    Feeling well this visit. No active cardiac complaints. Tolerating carvedilol well. Lipids now controlled. Compliant with medications and denies adverse effects.     Review of Systems   Constitutional: Negative for decreased appetite, fever, malaise/fatigue, weight gain and weight loss.   HENT:  Negative for congestion and nosebleeds.    Eyes:  Negative for blurred vision.   Cardiovascular:  Negative for chest pain, claudication, dyspnea on exertion, irregular heartbeat, leg swelling, near-syncope, orthopnea, palpitations, paroxysmal nocturnal dyspnea and syncope.   Respiratory:  Negative for cough and shortness of breath.    Endocrine: Negative for cold intolerance and heat intolerance.   Skin:  Negative for rash.   Musculoskeletal:  Negative for back pain and muscle cramps.   Gastrointestinal:  Negative for abdominal pain, constipation, diarrhea, heartburn, melena, nausea and vomiting.   Genitourinary:  Negative for dysuria, flank pain and hematuria.   Neurological:  Negative for dizziness.   Psychiatric/Behavioral:  Negative for altered mental status and depression.          Past Medical History:   Diagnosis Date    Arthritis 2014    hands and back    Benign hypertension 2014    well controlled on meds pt states increased with anxiety    Cold 2014 01/20 end of cold symptoms    Hypercholesteremia     Pain 2014    6/10         Past Surgical History:   Procedure Laterality Date    FUSION, SPINE, LUMBAR, PLIF  1/29/2014    Performed by Maci Babb M.D. at SURGERY [facility]    LUMBAR LAMINECTOMY DISKECTOMY  1/29/2014    Performed by Maci Babb M.D. at SURGERY [facility]    LUMBAR DECOMPRESSION  1/29/2014    Performed by Maci Babb M.D. at  SURGERY [facility]    HYSTERECTOMY, TOTAL ABDOMINAL      TONSILLECTOMY           Current Outpatient Medications   Medication Sig Dispense Refill    amLODIPine (NORVASC) 5 MG Tab Take 1 Tablet by mouth every evening. 90 Tablet 0    lisinopril (PRINIVIL) 40 MG tablet Take 1 Tablet by mouth every evening. 90 Tablet 0    Bempedoic Acid-Ezetimibe (NEXLIZET) 180-10 MG Tab Take 1 Tablet by mouth every day. 30 Tablet 6    carvedilol (COREG) 6.25 MG Tab Take 1 Tablet by mouth 2 times a day with meals. 180 Tablet 3    Evolocumab with Infusor (REPATHA PUSHTRONEX SYSTEM) 420 MG/3.5ML Solution Cartridge Inject 420mg under the skin every two weeks (Patient taking differently: 420 mg. Inject 420mg under the skin once a month) 3.5 mL 6    Vitamin D, Ergocalciferol, 50 MCG (2000 UT) Cap Vitamin D      coenzyme Q-10 30 MG capsule Take 300 mg by mouth every day.      aspirin EC (ECOTRIN) 81 MG Tablet Delayed Response Take 1 tablet by mouth every day. 90 tablet 3    nitrofurantoin (MACRODANTIN) 50 MG Cap Take 50 mg by mouth 4 times a day. Per pt takes it about every 2 weeks      ascorbic acid (ASCORBIC ACID) 500 MG Tab Take 500 mg by mouth every day.      ibuprofen (MOTRIN) 200 MG Tab Take 200 mg by mouth every 6 hours as needed.      Biotin 5 MG Cap Take  by mouth.       No current facility-administered medications for this visit.         No Known Allergies      Family History   Problem Relation Age of Onset    Heart Disease Father          from MI at age 32    Hyperlipidemia Father     GI Disease Mother         GERD    Hypertension Mother     Hyperlipidemia Daughter     Hyperlipidemia Grandchild          Social History     Socioeconomic History    Marital status:      Spouse name: Not on file    Number of children: Not on file    Years of education: Not on file    Highest education level: Not on file   Occupational History    Not on file   Tobacco Use    Smoking status: Never    Smokeless tobacco: Never   Vaping Use  "   Vaping Use: Never used   Substance and Sexual Activity    Alcohol use: Yes     Alcohol/week: 1.8 oz     Types: 3 Cans of beer per week     Comment: 2-3 per week    Drug use: No    Sexual activity: Yes     Partners: Male   Other Topics Concern    Not on file   Social History Narrative    Not on file     Social Determinants of Health     Financial Resource Strain: Not on file   Food Insecurity: Not on file   Transportation Needs: Not on file   Physical Activity: Not on file   Stress: Not on file   Social Connections: Not on file   Intimate Partner Violence: Not on file   Housing Stability: Not on file         Physical Exam:  Ambulatory Vitals  /76 (BP Location: Left arm, Patient Position: Sitting, BP Cuff Size: Adult)   Pulse 64   Resp 18   Ht 1.626 m (5' 4\")   Wt 82.1 kg (181 lb)   SpO2 95%    BP Readings from Last 4 Encounters:   05/19/23 114/76   01/23/23 (!) 147/85   01/03/23 139/75   11/23/22 110/74     Weight/BMI:   Vitals:    05/19/23 0855   BP: 114/76   Weight: 82.1 kg (181 lb)   Height: 1.626 m (5' 4\")    Body mass index is 31.07 kg/m².  Wt Readings from Last 4 Encounters:   05/19/23 82.1 kg (181 lb)   01/23/23 82.5 kg (181 lb 14.1 oz)   11/23/22 82.1 kg (181 lb)   06/15/22 82.2 kg (181 lb 3.5 oz)       Physical Exam  Constitutional:       General: She is not in acute distress.  HENT:      Head: Normocephalic and atraumatic.   Eyes:      Conjunctiva/sclera: Conjunctivae normal.      Pupils: Pupils are equal, round, and reactive to light.   Neck:      Vascular: No JVD.   Cardiovascular:      Rate and Rhythm: Normal rate and regular rhythm.      Heart sounds: Normal heart sounds. No murmur heard.     No friction rub. No gallop.   Pulmonary:      Effort: Pulmonary effort is normal. No respiratory distress.      Breath sounds: Normal breath sounds. No wheezing or rales.   Chest:      Chest wall: No tenderness.   Abdominal:      General: Bowel sounds are normal. There is no distension.      " Palpations: Abdomen is soft.   Musculoskeletal:      Cervical back: Normal range of motion and neck supple.   Skin:     General: Skin is warm and dry.   Neurological:      Mental Status: She is alert and oriented to person, place, and time.   Psychiatric:         Mood and Affect: Affect normal.         Judgment: Judgment normal.         Lab Data Review:  Lab Results   Component Value Date/Time    CHOLSTRLTOT 145 03/06/2023 07:06 AM    LDL 64 03/06/2023 07:06 AM    HDL 50 03/06/2023 07:06 AM    TRIGLYCERIDE 155 (H) 03/06/2023 07:06 AM       Lab Results   Component Value Date/Time    SODIUM 142 01/23/2023 03:40 PM    POTASSIUM 4.2 01/23/2023 03:40 PM    CHLORIDE 103 01/23/2023 03:40 PM    CO2 25 01/23/2023 03:40 PM    GLUCOSE 90 01/23/2023 03:40 PM    BUN 15 01/23/2023 03:40 PM    CREATININE 0.70 01/23/2023 03:40 PM    CREATININE 0.7 05/04/2007 10:30 AM    BUNCREATRAT 21 05/08/2017 08:27 AM     CrCl cannot be calculated (Patient's most recent lab result is older than the maximum 7 days allowed.).  Lab Results   Component Value Date/Time    ALKPHOSPHAT 85 01/23/2023 03:40 PM    ASTSGOT 38 01/23/2023 03:40 PM    ALTSGPT 30 01/23/2023 03:40 PM    TBILIRUBIN 0.3 01/23/2023 03:40 PM      Lab Results   Component Value Date/Time    WBC 5.9 01/23/2023 03:40 PM     Lab Results   Component Value Date/Time    HBA1C 5.6 03/10/2022 08:25 AM     No components found for: TROP      Cardiac Imaging and Procedures Review:      TTE 7/2012  Left Ventricle   Normal left ventricular size and function. Normal left ventricular wall   thickness. Normal left ventricular systolic function. Grade II   diastolic dysfunction is suggested by inflow Doppler and TDI but LA   dimension is normal. Left ventricular ejection fraction is 60% to 65%.   Normal regional wall motion.   CONCLUSIONS   Normal left ventricular size and function.   Very mild aortic valve leaflet thickening without significant stenosis   or regurgitation.   Trace mitral  regurgitation.     Stress echo 2012  CONCLUSIONS   Normal stress echocardiogram.     CAC CT 2017  Coronary calcification:  LMA - 0.0  LCX - 0.0  LAD - 17.4  RCA - 9.6  PDA - 0.0  Calcium score:  27.0    CAC CT 12/2022  Coronary calcification:  LMA - 26.3  LCX - 17.9  LAD - 96.0  RCA - 82.4  PDA - 0.0  Total Calcium Score: 222.7    Stress echo 2/2023  CONCLUSIONS  Negative stress echocardiogram for ischemia with adequate stress   achieved by heart rate criteria.   The global left ventricular systolic function improved with stress.   Occasional PVCs increased with stress improved with rest. No sustained   arrhythmias.  Hypertensive blood pressure response to exercise    Medical Decision Making:  Problem List Items Addressed This Visit       Essential hypertension, benign    Familial hyperlipidemia    Family history of early CAD     HTN - goal 120/80. Controlled. Continue regimen.    Familial HLD - controlled. Consider increasing pcsk9i frequency as currently still taking monthly. Could then dc nexlizet if cost or muscle aches a concern. Continue following with lipid clinic.    CAC on CT - agatston 222. Continue aspirin and pcsk9i.    It was my pleasure to meet with Ms. Arenas.

## 2023-07-14 DIAGNOSIS — I10 ESSENTIAL HYPERTENSION, BENIGN: ICD-10-CM

## 2023-07-14 RX ORDER — AMLODIPINE BESYLATE 5 MG/1
TABLET ORAL
Qty: 90 TABLET | Refills: 3 | Status: SHIPPED | OUTPATIENT
Start: 2023-07-14

## 2023-07-14 RX ORDER — LISINOPRIL 40 MG/1
TABLET ORAL
Qty: 90 TABLET | Refills: 3 | Status: SHIPPED | OUTPATIENT
Start: 2023-07-14

## 2023-07-14 NOTE — TELEPHONE ENCOUNTER
Is the patient due for a refill? Yes    Was the patient seen the past year? Yes    Date of last office visit: 5/19/2023    Does the patient have an upcoming appointment?  No      Provider to refill:VR    Does the patients insurance require a 100 day supply?  No

## 2023-07-21 ENCOUNTER — APPOINTMENT (RX ONLY)
Dept: URBAN - METROPOLITAN AREA CLINIC 15 | Facility: CLINIC | Age: 68
Setting detail: DERMATOLOGY
End: 2023-07-21

## 2023-07-21 DIAGNOSIS — D22 MELANOCYTIC NEVI: ICD-10-CM

## 2023-07-21 DIAGNOSIS — L98.8 OTHER SPECIFIED DISORDERS OF THE SKIN AND SUBCUTANEOUS TISSUE: ICD-10-CM

## 2023-07-21 DIAGNOSIS — D18.0 HEMANGIOMA: ICD-10-CM

## 2023-07-21 DIAGNOSIS — Z71.89 OTHER SPECIFIED COUNSELING: ICD-10-CM

## 2023-07-21 DIAGNOSIS — L57.3 POIKILODERMA OF CIVATTE: ICD-10-CM

## 2023-07-21 DIAGNOSIS — L82.1 OTHER SEBORRHEIC KERATOSIS: ICD-10-CM

## 2023-07-21 DIAGNOSIS — L81.4 OTHER MELANIN HYPERPIGMENTATION: ICD-10-CM

## 2023-07-21 PROBLEM — D22.112 MELANOCYTIC NEVI OF RIGHT LOWER EYELID, INCLUDING CANTHUS: Status: ACTIVE | Noted: 2023-07-21

## 2023-07-21 PROBLEM — D22.5 MELANOCYTIC NEVI OF TRUNK: Status: ACTIVE | Noted: 2023-07-21

## 2023-07-21 PROBLEM — D18.01 HEMANGIOMA OF SKIN AND SUBCUTANEOUS TISSUE: Status: ACTIVE | Noted: 2023-07-21

## 2023-07-21 PROCEDURE — ? SUNSCREEN TREATMENT REGIMEN

## 2023-07-21 PROCEDURE — ? COUNSELING

## 2023-07-21 PROCEDURE — 99213 OFFICE O/P EST LOW 20 MIN: CPT

## 2023-07-21 ASSESSMENT — LOCATION SIMPLE DESCRIPTION DERM
LOCATION SIMPLE: LEFT FOREARM
LOCATION SIMPLE: RIGHT INFERIOR EYELID
LOCATION SIMPLE: RIGHT THIGH
LOCATION SIMPLE: UPPER BACK
LOCATION SIMPLE: CHEST
LOCATION SIMPLE: LEFT ANTERIOR NECK
LOCATION SIMPLE: ABDOMEN
LOCATION SIMPLE: LEFT LIP
LOCATION SIMPLE: RIGHT SHOULDER
LOCATION SIMPLE: LEFT THIGH
LOCATION SIMPLE: RIGHT UPPER BACK
LOCATION SIMPLE: RIGHT FOREARM
LOCATION SIMPLE: RIGHT LIP
LOCATION SIMPLE: INFERIOR FOREHEAD

## 2023-07-21 ASSESSMENT — LOCATION DETAILED DESCRIPTION DERM
LOCATION DETAILED: INFERIOR THORACIC SPINE
LOCATION DETAILED: RIGHT MEDIAL INFERIOR EYELID
LOCATION DETAILED: INFERIOR MID FOREHEAD
LOCATION DETAILED: RIGHT INFERIOR VERMILION LIP
LOCATION DETAILED: RIGHT ANTERIOR DISTAL THIGH
LOCATION DETAILED: LEFT VENTRAL PROXIMAL FOREARM
LOCATION DETAILED: SUPERIOR THORACIC SPINE
LOCATION DETAILED: LEFT INFERIOR ANTERIOR NECK
LOCATION DETAILED: RIGHT VENTRAL PROXIMAL FOREARM
LOCATION DETAILED: RIGHT INFERIOR MEDIAL UPPER BACK
LOCATION DETAILED: RIGHT POSTERIOR SHOULDER
LOCATION DETAILED: LEFT ANTERIOR DISTAL THIGH
LOCATION DETAILED: UPPER STERNUM
LOCATION DETAILED: LEFT INFERIOR VERMILION LIP
LOCATION DETAILED: EPIGASTRIC SKIN

## 2023-07-21 ASSESSMENT — LOCATION ZONE DERM
LOCATION ZONE: ARM
LOCATION ZONE: LIP
LOCATION ZONE: NECK
LOCATION ZONE: FACE
LOCATION ZONE: TRUNK
LOCATION ZONE: EYELID
LOCATION ZONE: LEG

## 2023-09-01 ENCOUNTER — TELEPHONE (OUTPATIENT)
Dept: VASCULAR LAB | Facility: MEDICAL CENTER | Age: 68
End: 2023-09-01
Payer: MEDICARE

## 2023-09-01 DIAGNOSIS — E78.49 FAMILIAL HYPERLIPIDEMIA: ICD-10-CM

## 2023-09-01 DIAGNOSIS — Z82.49 FAMILY HISTORY OF EARLY CAD: ICD-10-CM

## 2023-09-01 DIAGNOSIS — I25.10 CORONARY ARTERY CALCIFICATION SEEN ON COMPUTED TOMOGRAPHY: ICD-10-CM

## 2023-09-01 NOTE — TELEPHONE ENCOUNTER
Pt called asking about labs required prior to her lipid pharmacotherapy f/u appt - placed orders for CMP & lipid panel.    Edis Shirely, Chris, BCACP

## 2023-09-05 ENCOUNTER — HOSPITAL ENCOUNTER (OUTPATIENT)
Dept: LAB | Facility: MEDICAL CENTER | Age: 68
End: 2023-09-05
Attending: INTERNAL MEDICINE
Payer: MEDICARE

## 2023-09-05 DIAGNOSIS — E78.49 FAMILIAL HYPERLIPIDEMIA: ICD-10-CM

## 2023-09-05 DIAGNOSIS — I25.10 CORONARY ARTERY CALCIFICATION SEEN ON COMPUTED TOMOGRAPHY: ICD-10-CM

## 2023-09-05 DIAGNOSIS — Z82.49 FAMILY HISTORY OF EARLY CAD: ICD-10-CM

## 2023-09-05 LAB
ALBUMIN SERPL BCP-MCNC: 4.7 G/DL (ref 3.2–4.9)
ALBUMIN/GLOB SERPL: 2 G/DL
ALP SERPL-CCNC: 83 U/L (ref 30–99)
ALT SERPL-CCNC: 31 U/L (ref 2–50)
ANION GAP SERPL CALC-SCNC: 11 MMOL/L (ref 7–16)
AST SERPL-CCNC: 31 U/L (ref 12–45)
BILIRUB SERPL-MCNC: 0.5 MG/DL (ref 0.1–1.5)
BUN SERPL-MCNC: 16 MG/DL (ref 8–22)
CALCIUM ALBUM COR SERPL-MCNC: 8.8 MG/DL (ref 8.5–10.5)
CALCIUM SERPL-MCNC: 9.4 MG/DL (ref 8.5–10.5)
CHLORIDE SERPL-SCNC: 106 MMOL/L (ref 96–112)
CHOLEST SERPL-MCNC: 139 MG/DL (ref 100–199)
CO2 SERPL-SCNC: 25 MMOL/L (ref 20–33)
CREAT SERPL-MCNC: 0.72 MG/DL (ref 0.5–1.4)
FASTING STATUS PATIENT QL REPORTED: NORMAL
GFR SERPLBLD CREATININE-BSD FMLA CKD-EPI: 90 ML/MIN/1.73 M 2
GLOBULIN SER CALC-MCNC: 2.3 G/DL (ref 1.9–3.5)
GLUCOSE SERPL-MCNC: 89 MG/DL (ref 65–99)
HDLC SERPL-MCNC: 35 MG/DL
LDLC SERPL CALC-MCNC: 65 MG/DL
POTASSIUM SERPL-SCNC: 4.6 MMOL/L (ref 3.6–5.5)
PROT SERPL-MCNC: 7 G/DL (ref 6–8.2)
SODIUM SERPL-SCNC: 142 MMOL/L (ref 135–145)
TRIGL SERPL-MCNC: 196 MG/DL (ref 0–149)

## 2023-09-05 PROCEDURE — 36415 COLL VENOUS BLD VENIPUNCTURE: CPT

## 2023-09-05 PROCEDURE — 80053 COMPREHEN METABOLIC PANEL: CPT

## 2023-09-05 PROCEDURE — 80061 LIPID PANEL: CPT

## 2023-09-06 ENCOUNTER — NON-PROVIDER VISIT (OUTPATIENT)
Dept: VASCULAR LAB | Facility: MEDICAL CENTER | Age: 68
End: 2023-09-06
Attending: INTERNAL MEDICINE
Payer: MEDICARE

## 2023-09-06 VITALS — SYSTOLIC BLOOD PRESSURE: 130 MMHG | DIASTOLIC BLOOD PRESSURE: 82 MMHG | HEART RATE: 59 BPM

## 2023-09-06 DIAGNOSIS — E78.49 FAMILIAL HYPERLIPIDEMIA: Primary | ICD-10-CM

## 2023-09-06 PROCEDURE — 99212 OFFICE O/P EST SF 10 MIN: CPT

## 2023-09-06 NOTE — PROGRESS NOTES
[facility] - FollowUp Visit  Date of Service: 09/06/23    Joanie Arenas is here for follow up of dyslipidemia.    Subjective    HPI  Pertinent Interval History since last visit:   None  Current Prescription Lipid Lowering Medications - including dose:   Statin: None  Non-Statin: Repatha 420mg SQ once per month, Nexlezet 180/10mg QD  Current Lipid Lowering and Related Supplements:   Vitamin D  Any Current Side Effects Potentially Related to Lipid Lowering therapy?   No  Current Adherence to Lipid Lowering Therapies:  Complete  Previously Attempted Interventions for Lipids - including outcome  Statin: Rosuvastatin, Atorvastatin, Pravastatin- she thinks she has tried them all since she has been on statin for at least 30+ years              Outcome:  having leg cramps, and taken off statins, but leg cramps persisted. Not true intolerance? Unsure, but CK values elevated so she remained off of statin  Non-Statin: None              Outcome: N/A  Any Previous History of Statin Intolerance?   Yes, Details: patient has been on statins for 30+ years, but   Baseline Lipids Prior to Treatment on Statin alone (on record, but Dr. Meeks states her peak LDL    Latest Reference Range & Units 06/20/18 10:19   Cholesterol,Tot 100 - 199 mg/dL 274 (H)   Triglycerides 0 - 149 mg/dL 133   HDL >=40 mg/dL 52   LDL <100 mg/dL 195 (H)   However, the patient has been on Repatha 140mg Q14d and Ezetimibe 10mg QD since Sept 2021 and those labs are below:     Latest Reference Range & Units 09/08/21 08:04   Cholesterol,Tot 100 - 199 mg/dL 238 (H)   Triglycerides 0 - 149 mg/dL 214 (H)   HDL >=40 mg/dL 47   LDL <100 mg/dL 148 (H)   (H): Data is abnormally high     Other Pertinent History & CV risk factors: Patient has elevated CAC score   Coronary calcification:  (previous CAC score 27 in 2017)  LMA - 26.3  LCX - 17.9  LAD - 96.0  RCA - 82.4  PDA - 0.0     Total Calcium Score: 222.7    SOCIAL HISTORY  Social History     Tobacco Use    Smoking Status Never   Smokeless Tobacco Never      Change in weight: Stable  Exercise habits: minimal exercise- pt walks every other day about 2 miles with her dogs (weather permiting)  Diet: common adult  Patient enjoys meat and sweets      Objective    Vitals:    09/06/23 0927 09/06/23 0928   BP: (!) 157/97 130/82   Pulse: 60 (!) 59      Physical Exam    DATA REVIEW  Most Recent Lipid Panel:   Lab Results   Component Value Date    CHOLSTRLTOT 139 09/05/2023    TRIGLYCERIDE 196 (H) 09/05/2023    HDL 35 (A) 09/05/2023    LDL 65 09/05/2023       Other Pertinent Blood Work:   Lab Results   Component Value Date    SODIUM 142 09/05/2023    POTASSIUM 4.6 09/05/2023    CHLORIDE 106 09/05/2023    CO2 25 09/05/2023    ANION 11.0 09/05/2023    GLUCOSE 89 09/05/2023    BUN 16 09/05/2023    CREATININE 0.72 09/05/2023    CALCIUM 9.4 09/05/2023    ASTSGOT 31 09/05/2023    ALTSGPT 31 09/05/2023    ALKPHOSPHAT 83 09/05/2023    TBILIRUBIN 0.5 09/05/2023    ALBUMIN 4.7 09/05/2023    AGRATIO 2.0 09/05/2023    LIPOPROTA 9 01/04/2023    TSHULTRASEN 2.450 03/10/2022    CPKTOTAL 198 (H) 10/17/2022       Other:  NA    Recent Imaging Studies:    Recent imaging studies, including CAC, were available in EMR and reviewed with patient at today's visit          ASSESSMENT AND PLAN  Patient Type, check all that apply:   Primary Prevention  Established Atherosclerotic Cardiovascular Disease (ASCVD)  Yes, Details: pt has elevated CAC score of 222.7  and HTN  Other Established (non-atherosclerotic) Vascular Disease, if Present:  None  Evidence of Heterozygous Familial Hypercholesterolemia (FH):   Yes,   ACC/AHA Indication for Statin Therapy, alma all that apply:  Established ASCVD: Indication for High intensity statin   Calculated Risk for ASCVD, if applicable    N/A  Other Significant Risk Markers, if any, alma all that apply   elevated coronary calcium score and Family history of premature ASCVD in first degree relative  Goal LDL-C and  nonHDL-C based on Clinic Protocol  LDL-C <100 or could be argued to be <70.  Lifestyle Recommendations From Today’s Visit:    Eating Plan: Concentrate on  Low sat/Trans fat, Low simple carb, and DASH/Med Style diet and Exercise: continue walking and increase time and intensity as tolerated  Statin Therapy Recommendations from Today’s Visit:   None  Non-Statin Medications Recommendations from Today’s Visit:   Continue Nexlizet 180/10mg QD  Indication for PCSK9 Inhibitor, if applicable:  FH with suboptimal control of LDL-C despite maximally tolerated statin - Continue with Repatha 420mg SQ once per month  Supplements Recommended at this visit:   Continue with Vit D 2000 units   Recommendations for Other Cardiovascular Risk Factors, alma all that apply:   Hypertension- 157/97 and 130/82  - continue to keep BP well controlled <130/70      Patient seen today for follow up.  Latest lipid panel shows LDL-C and non-HDL-C at goal.  Triglycerides remain elevated, discussed initiation of Vascepa to target TG <150. Patient would like to try improving diet and lifestyle before adding another medication to her regimen.    Patient has been dosing Repatha ONCE per month.  Tolerating Nexletol at this time.      Studies Ordered at Todays Visit:  None   Blood Work Ordered At Today’s visit:   Lipid panel   CMP  Follow-Up:   4 months    Aminta Hickman, ShanaeD    CC:  Jason K Wong, D.O. Radulescu, Vlad, M.D. Michael Bloch MD

## 2023-09-07 ENCOUNTER — OFFICE VISIT (OUTPATIENT)
Dept: MEDICAL GROUP | Facility: MEDICAL CENTER | Age: 68
End: 2023-09-07
Payer: MEDICARE

## 2023-09-07 VITALS
HEART RATE: 62 BPM | TEMPERATURE: 98.7 F | BODY MASS INDEX: 30.7 KG/M2 | HEIGHT: 64 IN | SYSTOLIC BLOOD PRESSURE: 110 MMHG | WEIGHT: 179.8 LBS | DIASTOLIC BLOOD PRESSURE: 70 MMHG | OXYGEN SATURATION: 96 %

## 2023-09-07 DIAGNOSIS — I25.10 CORONARY ARTERY CALCIFICATION SEEN ON COMPUTED TOMOGRAPHY: ICD-10-CM

## 2023-09-07 DIAGNOSIS — H91.93 BILATERAL HEARING LOSS, UNSPECIFIED HEARING LOSS TYPE: ICD-10-CM

## 2023-09-07 DIAGNOSIS — Z00.00 MEDICARE ANNUAL WELLNESS VISIT, SUBSEQUENT: ICD-10-CM

## 2023-09-07 DIAGNOSIS — E66.9 OBESITY (BMI 30-39.9): ICD-10-CM

## 2023-09-07 DIAGNOSIS — I10 ESSENTIAL HYPERTENSION, BENIGN: ICD-10-CM

## 2023-09-07 DIAGNOSIS — M85.89 OSTEOPENIA OF MULTIPLE SITES: ICD-10-CM

## 2023-09-07 DIAGNOSIS — E78.49 FAMILIAL HYPERLIPIDEMIA: ICD-10-CM

## 2023-09-07 PROCEDURE — G0439 PPPS, SUBSEQ VISIT: HCPCS | Performed by: STUDENT IN AN ORGANIZED HEALTH CARE EDUCATION/TRAINING PROGRAM

## 2023-09-07 PROCEDURE — 3074F SYST BP LT 130 MM HG: CPT | Performed by: STUDENT IN AN ORGANIZED HEALTH CARE EDUCATION/TRAINING PROGRAM

## 2023-09-07 PROCEDURE — 3078F DIAST BP <80 MM HG: CPT | Performed by: STUDENT IN AN ORGANIZED HEALTH CARE EDUCATION/TRAINING PROGRAM

## 2023-09-07 RX ORDER — AMLODIPINE BESYLATE 5 MG/1
1 TABLET ORAL EVERY EVENING
COMMUNITY
End: 2023-09-07

## 2023-09-07 RX ORDER — EVOLOCUMAB 140 MG/ML
INJECTION, SOLUTION SUBCUTANEOUS
COMMUNITY
End: 2023-09-07

## 2023-09-07 RX ORDER — EZETIMIBE 10 MG/1
TABLET ORAL
COMMUNITY
End: 2023-09-07

## 2023-09-07 RX ORDER — ESTRADIOL 0.1 MG/G
CREAM VAGINAL
COMMUNITY
Start: 2023-08-12 | End: 2023-09-07

## 2023-09-07 RX ORDER — SULFAMETHOXAZOLE AND TRIMETHOPRIM 800; 160 MG/1; MG/1
TABLET ORAL
COMMUNITY
End: 2023-09-07

## 2023-09-07 RX ORDER — ESTRADIOL 0.1 MG/G
CREAM VAGINAL
COMMUNITY
Start: 2023-05-17

## 2023-09-07 RX ORDER — LISINOPRIL 40 MG/1
1 TABLET ORAL EVERY EVENING
COMMUNITY
End: 2023-09-07

## 2023-09-07 ASSESSMENT — ENCOUNTER SYMPTOMS: GENERAL WELL-BEING: GOOD

## 2023-09-07 ASSESSMENT — ACTIVITIES OF DAILY LIVING (ADL): BATHING_REQUIRES_ASSISTANCE: 0

## 2023-09-07 ASSESSMENT — PATIENT HEALTH QUESTIONNAIRE - PHQ9: CLINICAL INTERPRETATION OF PHQ2 SCORE: 0

## 2023-09-07 ASSESSMENT — FIBROSIS 4 INDEX: FIB4 SCORE: 1.38

## 2023-09-07 NOTE — PROGRESS NOTES
Chief Complaint   Patient presents with    Annual Exam     AWV       HPI:  Joanie is a 68 y.o. here for Medicare Annual Wellness Visit    Problem   Medicare Annual Wellness Visit, Subsequent    Patient is here for Medicare annual wellness visit. Feels well overall.     Osteopenia of Multiple Sites    Chronic condition. Taking vitamin D supplement.     Coronary Artery Calcification Seen On Computed Tomography    Chronic condition. Calcium score 27 in 2017. Followed by cardiology.    Current regimen: aspirin 81mg daily, Zetia 10mg daily, Repatha 420mg monthly     Obesity (Bmi 30-39.9)    Chronic condition. She tries to eat healthy in general. She does regularly exercise with walking 2-4 miles daily.     Bilateral Hearing Loss    Chronic condition. Would like to get hearing test.     Essential Hypertension, Benign    Chronic condition.    Current medication regimen: lisinopril 40mg qd, amlodipine 5mg qd  Patient tolerating and reports compliant with medications. She does not regularly monitor blood pressure at home. Does not need refill of medications today. Denies headache, dizziness, vision changes, chest pain, dyspnea, edema.     Familial Hyperlipidemia    Chronic condition.     Current regimen: Nexlizet 180-10mg daily, Repatha 420mg monthly  She reports compliance and tolerating medication well. Denies musculoskeletal pain, headache, diarrhea. She does not need medication refill today. No acute concerns at this time.           Patient Active Problem List    Diagnosis Date Noted    Medicare annual wellness visit, subsequent 09/07/2023    Osteopenia of multiple sites 09/07/2023    Preventative health care 06/15/2022    Impaired fasting glucose 09/13/2021    Coronary artery calcification seen on computed tomography 07/02/2021    Numbness and tingling of right arm 06/10/2021    Fecal smearing 06/10/2021    Obesity (BMI 30-39.9) 06/10/2021    Gastroesophageal reflux disease without esophagitis 06/10/2021    Chronic  right shoulder pain 05/10/2017    Digital mucous cyst of toe of right foot 05/10/2017    Elevated CPK 05/11/2016    Bilateral hearing loss 12/29/2015    Pain with urination 12/01/2015    Muscle cramps 07/01/2015    Primary osteoarthritis of both knees 07/01/2015    Left foot pain 06/18/2014    Spinal stenosis, lumbar region, without neurogenic claudication 01/29/2014    Family history of early CAD 12/16/2013    Chronic lower back pain 12/16/2013    Vitamin D insufficiency 12/16/2013    Essential hypertension, benign 07/13/2012    Familial hyperlipidemia 07/13/2012       Current Outpatient Medications   Medication Sig Dispense Refill    estradiol (ESTRACE) 0.1 MG/GM vaginal cream       lisinopril (PRINIVIL) 40 MG tablet TAKE ONE TABLET BY MOUTH IN THE EVENING 90 Tablet 3    amLODIPine (NORVASC) 5 MG Tab TAKE ONE TABLET BY MOUTH IN THE EVENING 90 Tablet 3    Bempedoic Acid-Ezetimibe (NEXLIZET) 180-10 MG Tab Take 1 Tablet by mouth every day. 30 Tablet 6    carvedilol (COREG) 6.25 MG Tab Take 1 Tablet by mouth 2 times a day with meals. 180 Tablet 3    Evolocumab with Infusor (REPATHA PUSHTRONEX SYSTEM) 420 MG/3.5ML Solution Cartridge Inject 420mg under the skin every two weeks (Patient taking differently: 420 mg. Inject 420mg under the skin once a month) 3.5 mL 6    Vitamin D, Ergocalciferol, 50 MCG (2000 UT) Cap Vitamin D      aspirin EC (ECOTRIN) 81 MG Tablet Delayed Response Take 1 tablet by mouth every day. 90 tablet 3    nitrofurantoin (MACRODANTIN) 50 MG Cap Take 50 mg by mouth 4 times a day. Per pt takes it about every 2 weeks      ibuprofen (MOTRIN) 200 MG Tab Take 200 mg by mouth every 6 hours as needed.       No current facility-administered medications for this visit.        Patient is taking medications as noted in medication list.  Current supplements as per medication list.     Allergies: Patient has no known allergies.    Current social contact/activities: camping with friends, sees grandchildren  often    Is patient current with immunizations? Yes.    She  reports that she has never smoked. She has never used smokeless tobacco. She reports current alcohol use of about 1.8 oz of alcohol per week. She reports that she does not use drugs.  Counseling given: Not Answered      ROS:    Gait: Uses no assistive device   Ostomy: No   Other tubes: No   Amputations: No  Chronic oxygen use No   Last eye exam 2023  Wears hearing aids: No   : Denies any urinary leakage during the last 6 months    Screening:    Depression Screening  Little interest or pleasure in doing things?  0 - not at all  Feeling down, depressed, or hopeless? 0 - not at all  Trouble falling or staying asleep, or sleeping too much?     Feeling tired or having little energy?     Poor appetite or overeating?     Feeling bad about yourself - or that you are a failure or have let yourself or your family down?    Trouble concentrating on things, such as reading the newspaper or watching television?    Moving or speaking so slowly that other people could have noticed.  Or the opposite - being so fidgety or restless that you have been moving around a lot more than usual?     Thoughts that you would be better off dead, or of hurting yourself?     Patient Health Questionnaire Score:      If depressive symptoms identified deferred to follow up visit unless specifically addressed in assessment and plan.    Interpretation of PHQ-9 Total Score   Score Severity   1-4 No Depression   5-9 Mild Depression   10-14 Moderate Depression   15-19 Moderately Severe Depression   20-27 Severe Depression    Screening for Cognitive Impairment  Three Minute Recall (Banana, Sunrise, Chair)  3/3    Draw clock face with all 12 numbers and set the hands to show 20 past 8.  Yes    If cognitive concerns identified, deferred for follow up unless specifically addressed in assessment and plan.    Fall Risk Assessment  Has the patient had two or more falls in the last year or any fall  with injury in the last year?  No  If fall risk identified, deferred for follow up unless specifically addressed in assessment and plan.    Safety Assessment  Throw rugs on floor.   No  Handrails on all stairs.   No stairs  Good lighting in all hallways.   Yes  Difficulty hearing.  Yes  Patient counseled about all safety risks that were identified.    Functional Assessment ADLs  Are there any barriers preventing you from cooking for yourself or meeting nutritional needs?  No.    Are there any barriers preventing you from driving safely or obtaining transportation?  No.    Are there any barriers preventing you from using a telephone or calling for help?  No.    Are there any barriers preventing you from shopping?  No.    Are there any barriers preventing you from taking care of your own finances?  No.    Are there any barriers preventing you from managing your medications?  No.    Are there any barriers preventing you from showering, bathing or dressing yourself?  No.    Are you currently engaging in any exercise or physical activity?  Yes.  Walk dog everyday for 2 miles  What is your perception of your health?  Good.    Advance Care Planning  Do you have an Advance Directive, Living Will, Durable Power of , or POLST? Yes  Advance Directive       is on file    Health Maintenance Summary            Overdue - COVID-19 Vaccine (4 - Moderna series) Overdue since 12/30/2021 11/04/2021  Imm Admin: MODERNA SARS-COV-2 VACCINE (12+)    03/30/2021  Imm Admin: MODERNA SARS-COV-2 VACCINE (12+)    03/02/2021  Imm Admin: MODERNA SARS-COV-2 VACCINE (12+)              Ordered - Bone Density Scan (Every 5 Years) Ordered on 9/7/2023 08/08/2018  DS-BONE DENSITY STUDY (DEXA)              Overdue - Influenza Vaccine (1) Overdue since 9/1/2023 11/07/2022  Imm Admin: Influenza, Unspecified - HISTORICAL DATA    10/28/2021  Imm Admin: Influenza, Unspecified - HISTORICAL DATA    09/21/2020  Imm Admin: Influenza Vaccine  Adult HD    09/28/2019  Imm Admin: Influenza Vaccine Adult HD    09/25/2018  Imm Admin: Influenza Vaccine Adult HD    Only the first 5 history entries have been loaded, but more history exists.              Annual Wellness Visit (Every 366 Days) Next due on 9/7/2024 09/07/2023  Prob Dx: Medicare annual wellness visit, subsequent    09/07/2023  Visit Dx: Medicare annual wellness visit, subsequent    07/07/2020  Initial Annual Wellness Visit - Includes PPPS ()    07/07/2020  Visit Dx: Medicare annual wellness visit, initial              Mammogram (Every 2 Years) Next due on 3/6/2025      03/06/2023  MA-SCREENING MAMMO BILAT W/TOMOSYNTHESIS W/CAD    01/04/2021  MA-SCREENING MAMMO BILAT W/TOMOSYNTHESIS W/CAD    06/19/2019  MA-SCREENING MAMMO BILAT W/TOMOSYNTHESIS W/CAD    12/15/2017  MA-SCREEN MAMMO W/CAD-BILAT    11/07/2016  MA-SCREEN MAMMO W/CAD-BILAT    Only the first 5 history entries have been loaded, but more history exists.              IMM DTaP/Tdap/Td Vaccine (2 - Td or Tdap) Next due on 7/1/2025 07/01/2015  Imm Admin: Tdap Vaccine    05/11/2001  Imm Admin: TD Vaccine    10/06/1986  Imm Admin: TD Vaccine              Colorectal Cancer Screening (Colonoscopy - Preferred) Next due on 7/16/2031 07/16/2021  REFERRAL TO GI FOR COLONOSCOPY    07/16/2021  REFERRAL TO GI FOR COLONOSCOPY    12/09/2013  AMB REFERRAL TO GI FOR COLONOSCOPY    07/02/2012  OCCULT BLOOD FECES IMMUNOASSAY              Polio Vaccine (Inactivated Polio) (Series Information) Aged Out      10/06/1986  Imm Admin: OPV TRIVALENT - HISTORICAL DATA (GIVEN PRIOR TO MAY 2016)              Pneumococcal Vaccine: 65+ Years (Series Information) Completed      07/07/2020  Imm Admin: Pneumococcal polysaccharide vaccine (PPSV-23)    07/06/2018  Imm Admin: Pneumococcal Conjugate Vaccine (Prevnar/PCV-13)    11/26/2002  Imm Admin: Pneumococcal polysaccharide vaccine (PPSV-23)              Hepatitis C Screening  Tentatively Complete       2021  Hepatitis C Antibody component of HEP C VIRUS ANTIBODY              Zoster (Shingles) Vaccines (Series Information) Completed      2022  Imm Admin: Zoster Vaccine Recombinant (RZV) (SHINGRIX)    2022  Imm Admin: Zoster Vaccine Recombinant (RZV) (SHINGRIX)    2013  Imm Admin: Zoster Vaccine Live (ZVL) (Zostavax) - HISTORICAL DATA              Hepatitis A Vaccine (Hep A) (Series Information) Aged Out      No completion history exists for this topic.              Hepatitis B Vaccine (Hep B) (Series Information) Aged Out      No completion history exists for this topic.              HPV Vaccines (Series Information) Aged Out      No completion history exists for this topic.              IMM MENINGOCOCCAL ACWY VACCINE (Series Information) Aged Out      No completion history exists for this topic.                    Patient Care Team:  Nicolás Hannah D.O. as PCP - General (Family Medicine)    Social History     Tobacco Use    Smoking status: Never    Smokeless tobacco: Never   Vaping Use    Vaping Use: Never used   Substance Use Topics    Alcohol use: Yes     Alcohol/week: 1.8 oz     Types: 3 Cans of beer per week     Comment: 2-3 per week    Drug use: Never     Family History   Problem Relation Age of Onset    Heart Disease Father          from MI at age 32    Hyperlipidemia Father     GI Disease Mother         GERD    Hypertension Mother     Hyperlipidemia Daughter     Hyperlipidemia Grandchild      She  has a past medical history of Arthritis (), Benign hypertension (), Cold (), Hypercholesteremia, and Pain ().   Past Surgical History:   Procedure Laterality Date    FUSION, SPINE, LUMBAR, PLIF  2014    Performed by Maci Babb M.D. at SURGERY [facility] ORS    LUMBAR LAMINECTOMY DISKECTOMY  2014    Performed by Maci Babb M.D. at SURGERY [facility] ORS    LUMBAR DECOMPRESSION  2014    Performed by Maci Babb M.D. at SURGERY [facility]  "ORS    HYSTERECTOMY, TOTAL ABDOMINAL      TONSILLECTOMY         Exam:   /70 (BP Location: Left arm, Patient Position: Sitting, BP Cuff Size: Adult)   Pulse 62   Temp 37.1 °C (98.7 °F) (Temporal)   Ht 1.626 m (5' 4\")   Wt 81.6 kg (179 lb 12.8 oz)   SpO2 96%  Body mass index is 30.86 kg/m².    Hearing fair.    Dentition good  Alert, oriented in no acute distress  Eye contact is good, speech goal directed, affect calm    Assessment and Plan. The following treatment and monitoring plan is recommended:    1. Medicare annual wellness visit, subsequent  - Recommended age appropriate vaccinations and cancer screening  - Labs per orders.  - Vaccinations reviewed and discussed.  - Counseling about diet, supplements, exercise, skin care.  - Follow up 1 year for annual wellness    2. Essential hypertension, benign  Chronic condition, stable.  - cont current regimen: lisinopril 40mg qhs, amlodipine 5mg qhs    3. Familial hyperlipidemia  Chronic condition, stable. Followed and managed by cardiology/vascular medicine.  - cont current regimen: Nexlizet 180-10mg daily, Repatha 420mg monthly  - TSH; Future    4. Coronary artery calcification seen on computed tomography  Chronic condition, stable. Followed by cardiology.  - cont current regimen: aspirin 81mg daily, Nexlizet 180-10mg daily, Repatha 420mg monthly    5. Obesity (BMI 30-39.9)  - Advised healthy diet/weight loss, regular exercise    6. Osteopenia of multiple sites  Chronic condition. Plan to reassess.  - DS-BONE DENSITY STUDY (DEXA); Future  - VITAMIN D,25 HYDROXY (DEFICIENCY); Future    7. Bilateral hearing loss, unspecified hearing loss type  - Referral to Audiology    Services suggested: No services needed at this time  Health Care Screening recommendations as per orders if indicated.  Referrals offered: PT/OT/Nutrition counseling/Behavioral Health/Smoking cessation as per orders if indicated.    Discussion today about general wellness and lifestyle habits:  "   Prevent falls and reduce trip hazards; Cautioned about securing or removing rugs.  Have a working fire alarm and carbon monoxide detector;   Engage in regular physical activity and social activities.     Follow-up: Return in about 2 months (around 11/7/2023) for establish care.

## 2023-09-28 ENCOUNTER — HOSPITAL ENCOUNTER (OUTPATIENT)
Dept: RADIOLOGY | Facility: MEDICAL CENTER | Age: 68
End: 2023-09-28
Attending: STUDENT IN AN ORGANIZED HEALTH CARE EDUCATION/TRAINING PROGRAM
Payer: MEDICARE

## 2023-09-28 DIAGNOSIS — M85.89 OSTEOPENIA OF MULTIPLE SITES: ICD-10-CM

## 2023-09-28 PROCEDURE — 77080 DXA BONE DENSITY AXIAL: CPT

## 2023-10-27 ENCOUNTER — DOCUMENTATION (OUTPATIENT)
Dept: HEALTH INFORMATION MANAGEMENT | Facility: OTHER | Age: 68
End: 2023-10-27
Payer: MEDICARE

## 2023-11-10 ENCOUNTER — OFFICE VISIT (OUTPATIENT)
Dept: MEDICAL GROUP | Facility: IMAGING CENTER | Age: 68
End: 2023-11-10
Payer: MEDICARE

## 2023-11-10 VITALS
TEMPERATURE: 97.3 F | WEIGHT: 182 LBS | OXYGEN SATURATION: 97 % | DIASTOLIC BLOOD PRESSURE: 78 MMHG | SYSTOLIC BLOOD PRESSURE: 128 MMHG | HEART RATE: 64 BPM | BODY MASS INDEX: 31.07 KG/M2 | HEIGHT: 64 IN

## 2023-11-10 DIAGNOSIS — E78.49 FAMILIAL HYPERLIPIDEMIA: ICD-10-CM

## 2023-11-10 DIAGNOSIS — M85.89 OSTEOPENIA OF MULTIPLE SITES: ICD-10-CM

## 2023-11-10 DIAGNOSIS — I10 ESSENTIAL HYPERTENSION, BENIGN: ICD-10-CM

## 2023-11-10 DIAGNOSIS — E55.9 VITAMIN D INSUFFICIENCY: ICD-10-CM

## 2023-11-10 DIAGNOSIS — J34.2 DEVIATED NASAL SEPTUM: ICD-10-CM

## 2023-11-10 DIAGNOSIS — R06.83 SNORINGS: ICD-10-CM

## 2023-11-10 DIAGNOSIS — I25.10 CORONARY ARTERY CALCIFICATION SEEN ON COMPUTED TOMOGRAPHY: ICD-10-CM

## 2023-11-10 PROBLEM — N95.2 ATROPHIC VAGINITIS: Status: ACTIVE | Noted: 2019-09-26

## 2023-11-10 PROBLEM — Z87.440 HISTORY OF URINARY TRACT INFECTION: Status: ACTIVE | Noted: 2019-09-26

## 2023-11-10 PROBLEM — R12 HEARTBURN: Status: ACTIVE | Noted: 2021-11-29

## 2023-11-10 PROCEDURE — 99214 OFFICE O/P EST MOD 30 MIN: CPT | Performed by: INTERNAL MEDICINE

## 2023-11-10 PROCEDURE — 3078F DIAST BP <80 MM HG: CPT | Performed by: INTERNAL MEDICINE

## 2023-11-10 PROCEDURE — 3074F SYST BP LT 130 MM HG: CPT | Performed by: INTERNAL MEDICINE

## 2023-11-10 RX ORDER — CEPHALEXIN 250 MG/1
CAPSULE ORAL
COMMUNITY
Start: 2023-09-18

## 2023-11-10 ASSESSMENT — FIBROSIS 4 INDEX: FIB4 SCORE: 1.38

## 2023-11-10 NOTE — ASSESSMENT & PLAN NOTE
Chronic condition. Calcium score 227 in 2022. Followed by cardiology.    Current regimen: aspirin 81mg daily, Nexlizet 180-10 mg daily, Repatha 420mg monthly

## 2023-11-10 NOTE — ASSESSMENT & PLAN NOTE
Chronic condition.    Current medication regimen: lisinopril 40mg qd, amlodipine 5mg qd  Patient tolerating and reports compliant with medications. She does not regularly monitor blood pressure at home. Does not need refill of medications today. Denies headache, dizziness, vision changes, chest pain, dyspnea, edema.

## 2023-11-10 NOTE — ASSESSMENT & PLAN NOTE
Chronic condition. Taking vitamin D supplement.  9/28/2023 DEXA  According to the World Health Organization classification, bone mineral density of this patient is osteopenic for the proximal left femur and normal for the lumbar spine.     10-year Probability of Fracture:  Major Osteoporotic     19.8%  Hip     6.1%  Population      [place] ()    We discussed about pharmacotherapy, she declines and would like to recheck DEXA scan in one year.  calcium 1200 mg daily from all sources, vitamin D supplement       I personally evaluated the patient. I reviewed the Resident’s or Physician Assistant’s note (as assigned above), and agree with the findings and plan except as documented in my note. 11 yr old female presents to the ED for evaluation of left sided flank pain.  Denies trauma, heavy lifting, fever, dysuria, hematuria, vomiting.  Physical Exam: VS reviewed. Pt is well appearing, in no respiratory distress. MMM. Cap refill <2 seconds. Skin with no obvious rash noted.  Chest with no retractions, no distress. No CVA tenderness.  + pain with twisting. Neuro exam grossly intact.  Plan: Bedside US, UA, Toradol.  PMD follow up advised as needed.

## 2023-11-10 NOTE — PROGRESS NOTES
Established Patient    Joanie Arenas is a 68 y.o. female who presents today with the following:    CC:   Chief Complaint   Patient presents with    Establish Care    Seasonal Allergies       HPI:       Problem   Deviated Nasal Septum   Snorings    She has been snoring. She also has deviated septum. She would like to see ENT first     Osteopenia of Multiple Sites    Chronic condition. Taking vitamin D supplement.  9/28/2023 DEXA  According to the World Health Organization classification, bone mineral density of this patient is osteopenic for the proximal left femur and normal for the lumbar spine.     10-year Probability of Fracture:  Major Osteoporotic     19.8%  Hip     6.1%  Population      [place] ()    We discussed about pharmacotherapy, she declines and would like to recheck DEXA scan in one year.  calcium 1200 mg daily from all sources, vitamin D supplement       Heartburn   Coronary Artery Calcification Seen On Computed Tomography    Chronic condition. Calcium score 227 in 2022. Followed by cardiology.    Current regimen: aspirin 81mg daily, Nexlizet 180-10 mg daily, Repatha 420mg monthly     History of Urinary Tract Infection   Atrophic Vaginitis   Essential Hypertension, Benign    Chronic condition.    Current medication regimen: lisinopril 40mg qd, amlodipine 5mg qd  Patient tolerating and reports compliant with medications. She does not regularly monitor blood pressure at home. Does not need refill of medications today. Denies headache, dizziness, vision changes, chest pain, dyspnea, edema.     Familial Hyperlipidemia    Chronic condition.     Current regimen: Nexlizet 180-10mg daily, Repatha 420mg monthly  She reports compliance and tolerating medication well. Denies musculoskeletal pain, headache, diarrhea. She does not need medication refill today. No acute concerns at this time.            Current Outpatient Medications   Medication Sig    cephALEXin (KEFLEX) 250 MG Cap 1 PO post-coital PRN  "   NEXLIZET 180-10 MG Tab TAKE ONE TABLET BY MOUTH ONE TIME DAILY    estradiol (ESTRACE) 0.1 MG/GM vaginal cream     lisinopril (PRINIVIL) 40 MG tablet TAKE ONE TABLET BY MOUTH IN THE EVENING    amLODIPine (NORVASC) 5 MG Tab TAKE ONE TABLET BY MOUTH IN THE EVENING    carvedilol (COREG) 6.25 MG Tab Take 1 Tablet by mouth 2 times a day with meals.    Evolocumab with Infusor (REPATHA PUSHTRONEX SYSTEM) 420 MG/3.5ML Solution Cartridge Inject 420mg under the skin every two weeks (Patient taking differently: 420 mg. Inject 420mg under the skin once a month)    Vitamin D, Ergocalciferol, 50 MCG (2000 UT) Cap Vitamin D    aspirin EC (ECOTRIN) 81 MG Tablet Delayed Response Take 1 tablet by mouth every day.    ibuprofen (MOTRIN) 200 MG Tab Take 200 mg by mouth every 6 hours as needed.     No Known Allergies    Allergies, past medical history, past surgical history, medications, family history, social history reviewed and updated.    ROS Please see HPI    Physical Exam  Vitals: /78 (BP Location: Right arm, Patient Position: Sitting, BP Cuff Size: Adult long)   Pulse 64   Temp 36.3 °C (97.3 °F) (Temporal)   Ht 1.626 m (5' 4\")   Wt 82.6 kg (182 lb)   SpO2 97%   BMI 31.24 kg/m²   Physical Exam  Constitutional:       Appearance: Normal appearance.   HENT:      Head: Normocephalic and atraumatic.      Comments: Deviated nasal septum     Right Ear: External ear normal.      Left Ear: External ear normal.      Nose: Nose normal.      Mouth/Throat:      Pharynx: Oropharynx is clear.   Cardiovascular:      Rate and Rhythm: Normal rate and regular rhythm.      Pulses: Normal pulses.   Pulmonary:      Effort: Pulmonary effort is normal.      Breath sounds: Normal breath sounds.   Abdominal:      General: Bowel sounds are normal.      Palpations: Abdomen is soft.      Tenderness: There is no abdominal tenderness.   Musculoskeletal:      Cervical back: Neck supple.      Right lower leg: No edema.      Left lower leg: No edema. "   Skin:     General: Skin is warm and dry.   Neurological:      General: No focal deficit present.      Mental Status: She is alert.   Psychiatric:         Mood and Affect: Mood normal.         Behavior: Behavior normal.         Thought Content: Thought content normal.         Judgment: Judgment normal.             Assessment and Plan    1. Deviated nasal septum  - Referral to ENT    2. Snorings  - Referral to ENT  She would like to see ENT first, instead of sleep study    3. Familial hyperlipidemia  - TSH WITH REFLEX TO FT4; Future  Nexlizet 180-10mg daily, Repatha 420mg monthly  Follow with lipid clinic    4. Essential hypertension, benign  Chronic condition.  Continue lisinopril 40mg qd, amlodipine 5mg qd    5. Vitamin D insufficiency  - VITAMIN D,25 HYDROXY (DEFICIENCY); Future  vitamin D supplement    6. Osteopenia of multiple sites  Chronic condition.   9/28/2023 DEXA  According to the World Health Organization classification, bone mineral density of this patient is osteopenic for the proximal left femur and normal for the lumbar spine.     10-year Probability of Fracture:  Major Osteoporotic     19.8%  Hip     6.1%  Population      [place] ()    We discussed about pharmacotherapy, she declines and would like to recheck DEXA scan in one year.  calcium 1200 mg daily from all sources, vitamin D supplement    7. Coronary artery calcification seen on computed tomography  Chronic condition. Calcium score 227 in 2022. Followed by cardiology.    Current regimen: aspirin 81mg daily, Nexlizet 180-10 mg daily, Repatha 420mg monthly  Follow with cardiology      Follow-up:Return in about 1 year (around 11/10/2024), or if symptoms worsen or fail to improve.    This note was created using voice recognition software. There may be unintended errors in spelling, grammar or content.

## 2023-11-10 NOTE — ASSESSMENT & PLAN NOTE
Chronic condition.     Current regimen: Nexlizet 180-10mg daily, Repatha 420mg monthly  She reports compliance and tolerating medication well. Denies musculoskeletal pain, headache, diarrhea. She does not need medication refill today. No acute concerns at this time.

## 2023-11-27 DIAGNOSIS — E78.49 FAMILIAL HYPERLIPIDEMIA: ICD-10-CM

## 2023-11-28 RX ORDER — EVOLOCUMAB 420 MG/3.5
420 KIT SUBCUTANEOUS
Qty: 10.5 ML | Refills: 1 | Status: SHIPPED | OUTPATIENT
Start: 2023-11-28

## 2023-12-08 ENCOUNTER — APPOINTMENT (OUTPATIENT)
Dept: RADIOLOGY | Facility: MEDICAL CENTER | Age: 68
End: 2023-12-08
Attending: EMERGENCY MEDICINE
Payer: MEDICARE

## 2023-12-08 ENCOUNTER — HOSPITAL ENCOUNTER (EMERGENCY)
Facility: MEDICAL CENTER | Age: 68
End: 2023-12-08
Attending: EMERGENCY MEDICINE
Payer: MEDICARE

## 2023-12-08 VITALS
TEMPERATURE: 98.9 F | OXYGEN SATURATION: 97 % | HEART RATE: 68 BPM | HEIGHT: 64 IN | SYSTOLIC BLOOD PRESSURE: 154 MMHG | DIASTOLIC BLOOD PRESSURE: 99 MMHG | RESPIRATION RATE: 18 BRPM | WEIGHT: 182.32 LBS | BODY MASS INDEX: 31.13 KG/M2

## 2023-12-08 DIAGNOSIS — M71.21 BAKER'S CYST OF KNEE, RIGHT: ICD-10-CM

## 2023-12-08 LAB
ALBUMIN SERPL BCP-MCNC: 4.8 G/DL (ref 3.2–4.9)
ALBUMIN/GLOB SERPL: 1.8 G/DL
ALP SERPL-CCNC: 84 U/L (ref 30–99)
ALT SERPL-CCNC: 23 U/L (ref 2–50)
ANION GAP SERPL CALC-SCNC: 12 MMOL/L (ref 7–16)
AST SERPL-CCNC: 22 U/L (ref 12–45)
BASOPHILS # BLD AUTO: 0.6 % (ref 0–1.8)
BASOPHILS # BLD: 0.07 K/UL (ref 0–0.12)
BILIRUB SERPL-MCNC: 0.3 MG/DL (ref 0.1–1.5)
BUN SERPL-MCNC: 18 MG/DL (ref 8–22)
CALCIUM ALBUM COR SERPL-MCNC: 9.1 MG/DL (ref 8.5–10.5)
CALCIUM SERPL-MCNC: 9.7 MG/DL (ref 8.4–10.2)
CHLORIDE SERPL-SCNC: 104 MMOL/L (ref 96–112)
CK SERPL-CCNC: 150 U/L (ref 0–154)
CO2 SERPL-SCNC: 24 MMOL/L (ref 20–33)
CREAT SERPL-MCNC: 0.74 MG/DL (ref 0.5–1.4)
EOSINOPHIL # BLD AUTO: 0.01 K/UL (ref 0–0.51)
EOSINOPHIL NFR BLD: 0.1 % (ref 0–6.9)
ERYTHROCYTE [DISTWIDTH] IN BLOOD BY AUTOMATED COUNT: 43 FL (ref 35.9–50)
GFR SERPLBLD CREATININE-BSD FMLA CKD-EPI: 87 ML/MIN/1.73 M 2
GLOBULIN SER CALC-MCNC: 2.6 G/DL (ref 1.9–3.5)
GLUCOSE SERPL-MCNC: 104 MG/DL (ref 65–99)
HCT VFR BLD AUTO: 44.1 % (ref 37–47)
HGB BLD-MCNC: 14.6 G/DL (ref 12–16)
IMM GRANULOCYTES # BLD AUTO: 0.12 K/UL (ref 0–0.11)
IMM GRANULOCYTES NFR BLD AUTO: 1.1 % (ref 0–0.9)
LYMPHOCYTES # BLD AUTO: 2.42 K/UL (ref 1–4.8)
LYMPHOCYTES NFR BLD: 21.6 % (ref 22–41)
MCH RBC QN AUTO: 29.6 PG (ref 27–33)
MCHC RBC AUTO-ENTMCNC: 33.1 G/DL (ref 32.2–35.5)
MCV RBC AUTO: 89.5 FL (ref 81.4–97.8)
MONOCYTES # BLD AUTO: 0.79 K/UL (ref 0–0.85)
MONOCYTES NFR BLD AUTO: 7.1 % (ref 0–13.4)
NEUTROPHILS # BLD AUTO: 7.78 K/UL (ref 1.82–7.42)
NEUTROPHILS NFR BLD: 69.5 % (ref 44–72)
NRBC # BLD AUTO: 0 K/UL
NRBC BLD-RTO: 0 /100 WBC (ref 0–0.2)
PLATELET # BLD AUTO: 397 K/UL (ref 164–446)
PMV BLD AUTO: 9.6 FL (ref 9–12.9)
POTASSIUM SERPL-SCNC: 4.1 MMOL/L (ref 3.6–5.5)
PROT SERPL-MCNC: 7.4 G/DL (ref 6–8.2)
RBC # BLD AUTO: 4.93 M/UL (ref 4.2–5.4)
SODIUM SERPL-SCNC: 140 MMOL/L (ref 135–145)
WBC # BLD AUTO: 11.2 K/UL (ref 4.8–10.8)

## 2023-12-08 PROCEDURE — 85025 COMPLETE CBC W/AUTO DIFF WBC: CPT

## 2023-12-08 PROCEDURE — 82550 ASSAY OF CK (CPK): CPT

## 2023-12-08 PROCEDURE — 93971 EXTREMITY STUDY: CPT | Mod: RT

## 2023-12-08 PROCEDURE — 99283 EMERGENCY DEPT VISIT LOW MDM: CPT

## 2023-12-08 PROCEDURE — 80053 COMPREHEN METABOLIC PANEL: CPT

## 2023-12-08 PROCEDURE — 36415 COLL VENOUS BLD VENIPUNCTURE: CPT

## 2023-12-08 ASSESSMENT — FIBROSIS 4 INDEX: FIB4 SCORE: 1.38

## 2023-12-08 NOTE — ED PROVIDER NOTES
ED Provider Note    CHIEF COMPLAINT  Chief Complaint   Patient presents with    Leg Cramps     67 yo female ambulates to triage with reports of bilateral leg cramps behind both knees.  Patient reports this has gotten worse over a long period of time.  Patient reports this week she cannot get her right leg to feel better.  Seen by her doctor and they have not been able to find the cause.  Denies SOB.  Reports she has noticed her right foot to have some numbness as well.  Patient reports today getting into her car the pain got worse and she has noticed some swelling. No fever        EXTERNAL RECORDS REVIEWED  Outpatient Notes   and Outpatient labs & studies patient with mild renal sufficiency.  Follow-up with her primary doctor today    HPI/ROS  LIMITATION TO HISTORY   Select: : None  OUTSIDE HISTORIAN(S):  Significant other  points out this is worsening symptoms admitted for ulcerations and foot numbness    Joanie Arenas is a 68 y.o. female who presents complaining of right leg pain.  She has had cramps deep behind her knees for years.  She has seen a variety of doctors for this and no one can figure it out.  At one point they thought it was a statin, however after stopping that did not change anything.  She intermittently gets what she describes as almost like intermittent cramping.  1 week ago however she had the same symptom but the discomfort never really got better.  This is on the right side.  This is unusual.  Today, she was getting out of her car and felt sudden worsening of pain in the back of her leg.  There are some numbness over the top of her foot associated with this.  It hurts to walk.  She is not sure whether she had a muscle or something.  She does point out that her ENT doctor put her on prednisone and things in general are feeling pretty good right now.    PAST MEDICAL HISTORY   has a past medical history of Arthritis (2014), Benign hypertension (2014), Cold (2014), Hypercholesteremia,  "and Pain ().    SURGICAL HISTORY   has a past surgical history that includes hysterectomy, total abdominal; tonsillectomy; fusion, spine, lumbar, plif (2014); lumbar laminectomy diskectomy (2014); and lumbar decompression (2014).    FAMILY HISTORY  Family History   Problem Relation Age of Onset    Heart Disease Father          from MI at age 32    Hyperlipidemia Father     GI Disease Mother         GERD    Hypertension Mother     Hyperlipidemia Daughter     Hyperlipidemia Grandchild        SOCIAL HISTORY  Social History     Tobacco Use    Smoking status: Never    Smokeless tobacco: Never   Vaping Use    Vaping Use: Never used   Substance and Sexual Activity    Alcohol use: Yes     Alcohol/week: 1.8 oz     Types: 3 Cans of beer per week     Comment: 2-3 per week    Drug use: Never    Sexual activity: Yes     Partners: Male       CURRENT MEDICATIONS  Home Medications       Reviewed by Kate Arriola R.N. (Registered Nurse) on 23 at 1506  Med List Status: Not Addressed     Medication Last Dose Status   amLODIPine (NORVASC) 5 MG Tab  Active   aspirin EC (ECOTRIN) 81 MG Tablet Delayed Response  Active   carvedilol (COREG) 6.25 MG Tab  Active   cephALEXin (KEFLEX) 250 MG Cap  Active   estradiol (ESTRACE) 0.1 MG/GM vaginal cream  Active   Evolocumab with Infusor (REPATHA PUSHTRONEX SYSTEM) 420 MG/3.5ML On the body infusor injection  Active   ibuprofen (MOTRIN) 200 MG Tab  Active   lisinopril (PRINIVIL) 40 MG tablet  Active   NEXLIZET 180-10 MG Tab  Active   Vitamin D, Ergocalciferol, 50 MCG ( UT) Cap  Active                    ALLERGIES  No Known Allergies    PHYSICAL EXAM  VITAL SIGNS: BP (!) 169/107   Pulse 61   Temp 37.4 °C (99.4 °F) (Temporal)   Resp 16   Ht 1.626 m (5' 4\")   Wt 82.7 kg (182 lb 5.1 oz)   SpO2 96%   BMI 31.30 kg/m²    Constitutional: Well appearing patient in no acute distress.  HENT: Head is without trauma.  Mucous membranes are moist.  Eyes: Sclerae are " nonicteric, pupils are equally round.    Skin: No apparent rash.  I do not see petechiae or purpura.  Extremities: No evidence of acute trauma.  There is no tenderness over the calf.  No tenderness with range of motion of the ankle.  Neurologic: Alert. Moving all extremities.  Intact sensation and strength throughout.    Psychiatric: Normal for situation      DIAGNOSTIC STUDIES / PROCEDURES      LABS  Labs Reviewed   CBC WITH DIFFERENTIAL - Abnormal; Notable for the following components:       Result Value    WBC 11.2 (*)     Lymphocytes 21.60 (*)     Immature Granulocytes 1.10 (*)     Neutrophils (Absolute) 7.78 (*)     Immature Granulocytes (abs) 0.12 (*)     All other components within normal limits   COMP METABOLIC PANEL - Abnormal; Notable for the following components:    Glucose 104 (*)     All other components within normal limits   CREATINE KINASE   ESTIMATED GFR         RADIOLOGY    Radiologist interpretation:   US-EXTREMITY VENOUS LOWER UNILAT RIGHT   Final Result            COURSE & MEDICAL DECISION MAKING    ED Observation Status?  No    INITIAL ASSESSMENT, COURSE AND PLAN  Care Narrative: This patient presents with a long history of leg cramping of uncertain etiology.  However she has had persistent pain since an episode of cramping a week ago.  This was followed by a worsening pain today when getting out of the car.      Currently, this does not look like a gastrocnemius tear.  Also, I have a low suspicion for DVT.  However, we proceeded with an ultrasound.  This shows no evidence of a DVT.  Interesting there is a popliteal cyst here.  I wonder if this could be part of her symptomatology.  Consideration for NSAIDs, however she is already on a course of prednisone and have encouraged her to continue this and we will hsve her follow up with orthopaedics.    I did check laboratories which reveal a minimal leukocytosis but no shift or bandemia is reported by the lab.  Her chemistries are notable for normal  liver function test, normal renal function.  Also,  CK is in the normal range argue against rhabdomyolysis.    Instructions on Baker cyst      ADDITIONAL PROBLEM LIST      FINAL DIAGNOSIS  1. Baker's cyst of knee, right           Electronically signed by: Dax Schaefer M.D., 12/8/2023 3:49 PM

## 2023-12-08 NOTE — ED NOTES
ERP at bedside. Pt agrees with plan of care discussed by ERP. Elvia in low position, side rail up for pt safety. Call light within reach. Plan of care on-going

## 2023-12-08 NOTE — ED TRIAGE NOTES
"Chief Complaint   Patient presents with    Leg Cramps     69 yo female ambulates to triage with reports of bilateral leg cramps behind both knees.  Patient reports this has gotten worse over a long period of time.  Patient reports this week she cannot get her right leg to feel better.  Seen by her doctor and they have not been able to find the cause.  Denies SOB.  Reports she has noticed her right foot to have some numbness as well.  Patient reports today getting into her car the pain got worse and she has noticed some swelling. No fever     BP (!) 169/107   Pulse 61   Temp 37.4 °C (99.4 °F) (Temporal)   Resp 16   Ht 1.626 m (5' 4\")   Wt 82.7 kg (182 lb 5.1 oz)   SpO2 96%   BMI 31.30 kg/m²     "

## 2023-12-12 ENCOUNTER — APPOINTMENT (OUTPATIENT)
Dept: MEDICAL GROUP | Facility: IMAGING CENTER | Age: 68
End: 2023-12-12
Payer: MEDICARE

## 2023-12-14 ENCOUNTER — TELEPHONE (OUTPATIENT)
Dept: PHARMACY | Facility: MEDICAL CENTER | Age: 68
End: 2023-12-14
Payer: MEDICARE

## 2023-12-14 NOTE — TELEPHONE ENCOUNTER
NEXLIZET 180-10 MG Tab    PA Renewal      Initiated pa in cmm Key: [id number]    Per CMM: Authorization already on file for this request. Authorization starting on 01/01/2023 and ending on 12/31/2024.      Prior Authorization for Nexlizet has been approved for a quantity of 30 , day supply 30    Prior Authorization reference number: N/A  Insurance: [employer]  Effective dates: 01/01/2023 - 12/31/2024  Copay: $N/A not contracted with [employer]     Is patient eligible to fill with [facility]? No, test claim rejected stating not contracted with [employer].

## 2024-01-12 ENCOUNTER — HOSPITAL ENCOUNTER (OUTPATIENT)
Dept: LAB | Facility: MEDICAL CENTER | Age: 69
End: 2024-01-12
Attending: INTERNAL MEDICINE
Payer: MEDICARE

## 2024-01-12 DIAGNOSIS — E78.49 FAMILIAL HYPERLIPIDEMIA: ICD-10-CM

## 2024-01-12 DIAGNOSIS — E55.9 VITAMIN D INSUFFICIENCY: ICD-10-CM

## 2024-01-12 LAB
25(OH)D3 SERPL-MCNC: 58 NG/ML (ref 30–100)
ALBUMIN SERPL BCP-MCNC: 4.7 G/DL (ref 3.2–4.9)
ALBUMIN/GLOB SERPL: 2.1 G/DL
ALP SERPL-CCNC: 65 U/L (ref 30–99)
ALT SERPL-CCNC: 23 U/L (ref 2–50)
ANION GAP SERPL CALC-SCNC: 13 MMOL/L (ref 7–16)
AST SERPL-CCNC: 27 U/L (ref 12–45)
BILIRUB SERPL-MCNC: 0.5 MG/DL (ref 0.1–1.5)
BUN SERPL-MCNC: 15 MG/DL (ref 8–22)
CALCIUM ALBUM COR SERPL-MCNC: 8.8 MG/DL (ref 8.5–10.5)
CALCIUM SERPL-MCNC: 9.4 MG/DL (ref 8.4–10.2)
CHLORIDE SERPL-SCNC: 102 MMOL/L (ref 96–112)
CHOLEST SERPL-MCNC: 196 MG/DL (ref 100–199)
CO2 SERPL-SCNC: 26 MMOL/L (ref 20–33)
CREAT SERPL-MCNC: 0.61 MG/DL (ref 0.5–1.4)
FASTING STATUS PATIENT QL REPORTED: NORMAL
GFR SERPLBLD CREATININE-BSD FMLA CKD-EPI: 96 ML/MIN/1.73 M 2
GLOBULIN SER CALC-MCNC: 2.2 G/DL (ref 1.9–3.5)
GLUCOSE SERPL-MCNC: 93 MG/DL (ref 65–99)
HDLC SERPL-MCNC: 54 MG/DL
LDLC SERPL CALC-MCNC: 123 MG/DL
POTASSIUM SERPL-SCNC: 4.2 MMOL/L (ref 3.6–5.5)
PROT SERPL-MCNC: 6.9 G/DL (ref 6–8.2)
SODIUM SERPL-SCNC: 141 MMOL/L (ref 135–145)
TRIGL SERPL-MCNC: 96 MG/DL (ref 0–149)
TSH SERPL DL<=0.005 MIU/L-ACNC: 2.43 UIU/ML (ref 0.38–5.33)

## 2024-01-12 PROCEDURE — 80061 LIPID PANEL: CPT

## 2024-01-12 PROCEDURE — 84443 ASSAY THYROID STIM HORMONE: CPT

## 2024-01-12 PROCEDURE — 36415 COLL VENOUS BLD VENIPUNCTURE: CPT

## 2024-01-12 PROCEDURE — 82306 VITAMIN D 25 HYDROXY: CPT

## 2024-01-12 PROCEDURE — 80053 COMPREHEN METABOLIC PANEL: CPT

## 2024-01-17 ENCOUNTER — NON-PROVIDER VISIT (OUTPATIENT)
Dept: VASCULAR LAB | Facility: MEDICAL CENTER | Age: 69
End: 2024-01-17
Attending: INTERNAL MEDICINE
Payer: MEDICARE

## 2024-01-17 ENCOUNTER — PHARMACY VISIT (OUTPATIENT)
Dept: PHARMACY | Facility: MEDICAL CENTER | Age: 69
End: 2024-01-17
Payer: COMMERCIAL

## 2024-01-17 DIAGNOSIS — E78.49 FAMILIAL HYPERLIPIDEMIA: ICD-10-CM

## 2024-01-17 PROCEDURE — 99212 OFFICE O/P EST SF 10 MIN: CPT | Performed by: PHARMACIST

## 2024-01-17 PROCEDURE — RXMED WILLOW AMBULATORY MEDICATION CHARGE

## 2024-01-17 RX ORDER — BEMPEDOIC ACID AND EZETIMIBE 180; 10 MG/1; MG/1
1 TABLET, FILM COATED ORAL DAILY
Qty: 28 TABLET | Refills: 0 | Status: SHIPPED | OUTPATIENT
Start: 2024-01-17 | End: 2024-02-01

## 2024-01-17 NOTE — PROGRESS NOTES
[facility] - FollowUp Visit  Date of Service: 1/17/24    Joanie Arenas is here for follow up of dyslipidemia.    Subjective    HPI  Pertinent Interval History since last visit:   Price of Nexlizet increased with beginning of the year deductible, was without supply for last couple weeks.  Current Prescription Lipid Lowering Medications - including dose:   Statin: None  Non-Statin: Repatha 420mg SQ once per month  Nexlizet 180/10mg QD  Current Lipid Lowering and Related Supplements:   Vitamin D  Any Current Side Effects Potentially Related to Lipid Lowering therapy?   No  Current Adherence to Lipid Lowering Therapies:  Partial compliance  Previously Attempted Interventions for Lipids - including outcome  Statin: Rosuvastatin, Atorvastatin, Pravastatin- she thinks she has tried them all since she has been on statin for at least 30+ years              Outcome:  having leg cramps, and taken off statins, but leg cramps persisted. Not true intolerance? Unsure, but CK values elevated so she remained off of statin  Non-Statin: None              Outcome: N/A  Any Previous History of Statin Intolerance?   Yes, Details: patient has been on statins for 30+ years, but   Baseline Lipids Prior to Treatment on Statin alone     Latest Reference Range & Units 06/20/18 10:19   Cholesterol,Tot 100 - 199 mg/dL 274 (H)   Triglycerides 0 - 149 mg/dL 133   HDL >=40 mg/dL 52   LDL <100 mg/dL 195 (H)   However, the patient has been on Repatha 140mg Q14d and Ezetimibe 10mg QD since Sept 2021 and those labs are below:     Latest Reference Range & Units 09/08/21 08:04   Cholesterol,Tot 100 - 199 mg/dL 238 (H)   Triglycerides 0 - 149 mg/dL 214 (H)   HDL >=40 mg/dL 47   LDL <100 mg/dL 148 (H)   (H): Data is abnormally high     Other Pertinent History & CV risk factors: Patient has elevated CAC score   Coronary calcification:  (previous CAC score 27 in 2017)  LMA - 26.3  LCX - 17.9  LAD - 96.0  RCA - 82.4  PDA - 0.0     Total Calcium  Score: 222.7    SOCIAL HISTORY  Social History     Tobacco Use   Smoking Status Never   Smokeless Tobacco Never      Change in weight: Has lost ~12 lbs since Xmas time  Exercise habits: minimal exercise- injured her knee in Dec, having surgery to repair torn meniscus beginning of Feb.  Diet: has started Dash Diet plan since first of the year.      Objective    There were no vitals filed for this visit.       DATA REVIEW  Most Recent Lipid Panel:   Lab Results   Component Value Date    CHOLSTRLTOT 196 01/12/2024    TRIGLYCERIDE 96 01/12/2024    HDL 54 01/12/2024     (H) 01/12/2024       Other Pertinent Blood Work:   Lab Results   Component Value Date    SODIUM 141 01/12/2024    POTASSIUM 4.2 01/12/2024    CHLORIDE 102 01/12/2024    CO2 26 01/12/2024    ANION 13.0 01/12/2024    GLUCOSE 93 01/12/2024    BUN 15 01/12/2024    CREATININE 0.61 01/12/2024    CALCIUM 9.4 01/12/2024    ASTSGOT 27 01/12/2024    ALTSGPT 23 01/12/2024    ALKPHOSPHAT 65 01/12/2024    TBILIRUBIN 0.5 01/12/2024    ALBUMIN 4.7 01/12/2024    AGRATIO 2.1 01/12/2024    LIPOPROTA 9 01/04/2023    TSHULTRASEN 2.430 01/12/2024    CPKTOTAL 150 12/08/2023       Other:  NA    Recent Imaging Studies:    Recent imaging studies, including CAC, were available in EMR and reviewed with patient at today's visit          ASSESSMENT AND PLAN  Patient Type, check all that apply:   Primary Prevention  Established Atherosclerotic Cardiovascular Disease (ASCVD)  Yes, Details: pt has elevated CAC score of 222.7  and HTN  Other Established (non-atherosclerotic) Vascular Disease, if Present:  None  Evidence of Heterozygous Familial 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  Lifestyle Recommendations From Today’s Visit:    Eating Plan: Concentrate on  Low sat/Trans fat, Low simple carb, and DASH/Med Style diet and Exercise: continue walking and increase time and intensity as tolerated  Statin Therapy Recommendations from Today’s Visit:   None  Non-Statin Medications Recommendations from Today’s Visit:   Continue Nexlizet 180/10mg QD  Indication for PCSK9 Inhibitor, if applicable:  FH with suboptimal control of LDL-C despite maximally tolerated statin - Continue with Repatha 420mg SQ once per month  Supplements Recommended at this visit:   Continue with Vit D 2000 units   Recommendations for Other Cardiovascular Risk Factors, alma all that apply:   Hypertension- 157/97 and 130/82  - continue to keep BP well controlled <130/70      Patient seen today for follow up.  LDL-C and non-HDL- C have increased from last visit.  This can be attributed to her missing doses of Nexlizet d/t cost with deductible.  Has been actively improving nutrition plan, changed to Dash plan at first of the year and has lost ~12lbs since then.    Will restart Nexlizet at this time, sample for one month sent to [facility].  She is comfortable with cost past that time.  Will continue all other medications at this time.    Studies Ordered at Todays Visit:  None   Blood Work Ordered At Today’s visit:   Lipid panel   CMP  Follow-Up:   8 weeks.    Efren Higuera, PharmD, BCACP    CC:  Jason K Wong, D.O. Radulescu, Vlad, M.D. Michael Bloch MD

## 2024-02-01 ENCOUNTER — OFFICE VISIT (OUTPATIENT)
Dept: MEDICAL GROUP | Facility: IMAGING CENTER | Age: 69
End: 2024-02-01
Payer: MEDICARE

## 2024-02-01 ENCOUNTER — HOSPITAL ENCOUNTER (OUTPATIENT)
Dept: LAB | Facility: MEDICAL CENTER | Age: 69
End: 2024-02-01
Attending: INTERNAL MEDICINE
Payer: MEDICARE

## 2024-02-01 VITALS
HEIGHT: 64 IN | WEIGHT: 168 LBS | TEMPERATURE: 97.2 F | HEART RATE: 88 BPM | BODY MASS INDEX: 28.68 KG/M2 | SYSTOLIC BLOOD PRESSURE: 130 MMHG | OXYGEN SATURATION: 96 % | DIASTOLIC BLOOD PRESSURE: 72 MMHG

## 2024-02-01 DIAGNOSIS — Z01.818 PREOP GENERAL PHYSICAL EXAM: ICD-10-CM

## 2024-02-01 LAB
BASOPHILS # BLD AUTO: 0.9 % (ref 0–1.8)
BASOPHILS # BLD: 0.06 K/UL (ref 0–0.12)
EOSINOPHIL # BLD AUTO: 0.09 K/UL (ref 0–0.51)
EOSINOPHIL NFR BLD: 1.3 % (ref 0–6.9)
ERYTHROCYTE [DISTWIDTH] IN BLOOD BY AUTOMATED COUNT: 43.6 FL (ref 35.9–50)
HCT VFR BLD AUTO: 43 % (ref 37–47)
HGB BLD-MCNC: 14.1 G/DL (ref 12–16)
IMM GRANULOCYTES # BLD AUTO: 0.02 K/UL (ref 0–0.11)
IMM GRANULOCYTES NFR BLD AUTO: 0.3 % (ref 0–0.9)
LYMPHOCYTES # BLD AUTO: 2.29 K/UL (ref 1–4.8)
LYMPHOCYTES NFR BLD: 33.9 % (ref 22–41)
MCH RBC QN AUTO: 29.4 PG (ref 27–33)
MCHC RBC AUTO-ENTMCNC: 32.8 G/DL (ref 32.2–35.5)
MCV RBC AUTO: 89.8 FL (ref 81.4–97.8)
MONOCYTES # BLD AUTO: 0.63 K/UL (ref 0–0.85)
MONOCYTES NFR BLD AUTO: 9.3 % (ref 0–13.4)
NEUTROPHILS # BLD AUTO: 3.67 K/UL (ref 1.82–7.42)
NEUTROPHILS NFR BLD: 54.3 % (ref 44–72)
NRBC # BLD AUTO: 0 K/UL
NRBC BLD-RTO: 0 /100 WBC (ref 0–0.2)
PLATELET # BLD AUTO: 282 K/UL (ref 164–446)
PMV BLD AUTO: 10.4 FL (ref 9–12.9)
RBC # BLD AUTO: 4.79 M/UL (ref 4.2–5.4)
WBC # BLD AUTO: 6.8 K/UL (ref 4.8–10.8)

## 2024-02-01 PROCEDURE — 36415 COLL VENOUS BLD VENIPUNCTURE: CPT

## 2024-02-01 PROCEDURE — 99213 OFFICE O/P EST LOW 20 MIN: CPT | Performed by: INTERNAL MEDICINE

## 2024-02-01 PROCEDURE — 93000 ELECTROCARDIOGRAM COMPLETE: CPT | Performed by: INTERNAL MEDICINE

## 2024-02-01 PROCEDURE — 3078F DIAST BP <80 MM HG: CPT | Performed by: INTERNAL MEDICINE

## 2024-02-01 PROCEDURE — 85025 COMPLETE CBC W/AUTO DIFF WBC: CPT

## 2024-02-01 PROCEDURE — 3075F SYST BP GE 130 - 139MM HG: CPT | Performed by: INTERNAL MEDICINE

## 2024-02-01 ASSESSMENT — PATIENT HEALTH QUESTIONNAIRE - PHQ9: CLINICAL INTERPRETATION OF PHQ2 SCORE: 0

## 2024-02-01 ASSESSMENT — FIBROSIS 4 INDEX: FIB4 SCORE: 1.01

## 2024-02-01 NOTE — PROGRESS NOTES
PREOPERATIVE EVALUATION    Name of surgeon requesting this evaluation: Marques Torres M.D.  Phone: [phone] Fax [phone]  Planned surgery: Right knee arthroscopic procedure  Planned anesthesia: General  Planned date of surgery: Feb 8,2024  Location: [facility]    12/31/2023 MRI right knee   Small complex tear through the posterior horn the medial meniscus at the root  Patient Active Problem List    Diagnosis Date Noted    Deviated nasal septum 11/10/2023    Snorings 11/10/2023    Medicare annual wellness visit, subsequent 09/07/2023    Osteopenia of multiple sites 09/07/2023    Preventative health care 06/15/2022    Heartburn 11/29/2021    Impaired fasting glucose 09/13/2021    Coronary artery calcification seen on computed tomography 07/02/2021    Numbness and tingling of right arm 06/10/2021    Fecal smearing 06/10/2021    Obesity (BMI 30-39.9) 06/10/2021    Gastroesophageal reflux disease without esophagitis 06/10/2021    History of urinary tract infection 09/26/2019    Atrophic vaginitis 09/26/2019    Chronic right shoulder pain 05/10/2017    Digital mucous cyst of toe of right foot 05/10/2017    Elevated CPK 05/11/2016    Bilateral hearing loss 12/29/2015    Pain with urination 12/01/2015    Muscle cramps 07/01/2015    Primary osteoarthritis of both knees 07/01/2015    Left foot pain 06/18/2014    Spinal stenosis, lumbar region, without neurogenic claudication 01/29/2014    Family history of early CAD 12/16/2013    Chronic lower back pain 12/16/2013    Vitamin D insufficiency 12/16/2013    Essential hypertension, benign 07/13/2012    Familial hyperlipidemia 07/13/2012       Past Medical History:   Diagnosis Date    Arthritis 2014    hands and back    Benign hypertension 2014    well controlled on meds pt states increased with anxiety    Cold 2014 01/20 end of cold symptoms    Hypercholesteremia     Pain 2014    6/10       Past Surgical History:   Procedure Laterality Date     FUSION, SPINE, LUMBAR, PLIF  2014    Performed by Maci Babb M.D. at SURGERY [facility]    LUMBAR LAMINECTOMY DISKECTOMY  2014    Performed by Maci Babb M.D. at SURGERY [facility]    LUMBAR DECOMPRESSION  2014    Performed by Maci Babb M.D. at SURGERY [facility]    HYSTERECTOMY, TOTAL ABDOMINAL      TONSILLECTOMY         Family History   Problem Relation Age of Onset    Heart Disease Father          from MI at age 32    Hyperlipidemia Father     GI Disease Mother         GERD    Hypertension Mother     Hyperlipidemia Daughter     Hyperlipidemia Grandchild        Social History     Socioeconomic History    Marital status:      Spouse name: Not on file    Number of children: Not on file    Years of education: Not on file    Highest education level: Not on file   Occupational History    Not on file   Tobacco Use    Smoking status: Never    Smokeless tobacco: Never   Vaping Use    Vaping Use: Never used   Substance and Sexual Activity    Alcohol use: Yes     Alcohol/week: 1.8 oz     Types: 3 Cans of beer per week     Comment: 2-3 per week    Drug use: Never    Sexual activity: Yes     Partners: Male   Other Topics Concern    Not on file   Social History Narrative    Not on file     Social Determinants of Health     Financial Resource Strain: Not on file   Food Insecurity: Not on file   Transportation Needs: Not on file   Physical Activity: Not on file   Stress: Not on file   Social Connections: Not on file   Intimate Partner Violence: Not on file   Housing Stability: Not on file       No Known Allergies  No known allergy to latex  No known allergy to tape adhesive  No known allergy to iodine      Current Outpatient Medications:     Evolocumab with Infusor (REPATHA PUSHTRONEX SYSTEM) 420 MG/3.5ML On the body infusor injection, Inject 3.5 mL under the skin every 28 days. Inject 420mg under the skin once every 28 days, Disp: 10.5 mL, Rfl: 1    cephALEXin (KEFLEX)  250 MG Cap, 1 PO post-coital PRN, Disp: , Rfl:     NEXLIZET 180-10 MG Tab, TAKE ONE TABLET BY MOUTH ONE TIME DAILY, Disp: 90 Tablet, Rfl: 3    estradiol (ESTRACE) 0.1 MG/GM vaginal cream, , Disp: , Rfl:     lisinopril (PRINIVIL) 40 MG tablet, TAKE ONE TABLET BY MOUTH IN THE EVENING, Disp: 90 Tablet, Rfl: 3    amLODIPine (NORVASC) 5 MG Tab, TAKE ONE TABLET BY MOUTH IN THE EVENING, Disp: 90 Tablet, Rfl: 3    carvedilol (COREG) 6.25 MG Tab, Take 1 Tablet by mouth 2 times a day with meals., Disp: 180 Tablet, Rfl: 3    Vitamin D, Ergocalciferol, 50 MCG (2000 UT) Cap, Vitamin D, Disp: , Rfl:     aspirin EC (ECOTRIN) 81 MG Tablet Delayed Response, Take 1 tablet by mouth every day., Disp: 90 tablet, Rfl: 3    ibuprofen (MOTRIN) 200 MG Tab, Take 200 mg by mouth every 6 hours as needed., Disp: , Rfl:       REVIEW OF SYSTEMS:    ANESTHESIA ISSUES:  No known problem    BLEEDING RISK: no recent abnormal bleeding    CONSTITUTIONAL: Denies, fever, chills, night sweats    NEUROLOGIC: Denies, syncope, paresthesias, aphasia, involuntary movements    OPHTHALMIC: Denies, any vision problems    ENT: Decreased hearing at baseline. Minimal epistaxis      RESPIRATORY: Denies, dyspnea on exertion, dyspnea at rest, cough, wheeze, known nighttime apneas    CARDIOVASCULAR: Denies, chest pain, palpitations.  Is able to perform greater than 4 METs. Can walk for a mile and she has to stop due to her right knee pain      GI: Denies, nausea, vomiting, abdominal pain, diarrhea, constipation    GENITOURINARY: Denies, hematuria, dysuria    INTEGUMENTARY: a little bruising. Denies, skin rash, itching    RHEUMATOLOGIC/MSK:chronic right knee pain and swelling.     ENDOCRINE: Denies, polyuria, polydipsia, heat intolerance, cold intolerance    Any objection to receiving blood products if needed? no      PHYSICAL EXAM:  /72 (BP Location: Right arm, Patient Position: Sitting, BP Cuff Size: Adult long)   Pulse 88   Temp 36.2 °C (97.2 °F) (Temporal)    "Ht 1.626 m (5' 4\")   Wt 76.2 kg (168 lb)   SpO2 96%   BMI 28.84 kg/m²     General: Normal appearance.  Well developed, well nourished, no acute distress.  HEENT: Normocephalic.  Extraocular motion intact. Pupils are equally round, reactive to light and accommodation, conjunctiva clear, no scleral icterus.  Ears: normal shape and contour, ear canals clear, tympanic membranes intact. Hearing intact.  Oropharynx clear, no erythema, edema or exudate noted.  NECK: Thyroid is not enlarged. No JVD.  No carotid bruits. No masses.  Cardiovascular: Regular rhythm and rate.  Respiratory: Normal respiratory effort, clear to auscultation bilaterally. No wheezing/rales/rhonchi.    Abdomen: Bowel sounds present, soft, nontender, nondistended, no rebound, no guarding. No hepatosplenomegaly.  : No suprapubic tenderness. No CVA tenderness.   EXT: Right knee swelling. no LE edema b/l. No cyanosis.  No clubbing.  Lymph: No cervical, supraclavicular or axillary lymph nodes are palpable  Skin: Warm and dry.    Neurologic: No focal deficits.    Psych: AAOx3,  Normal mood and affect, normal judgment and insight, memory within normal limits     Latest Reference Range & Units 02/01/24 14:38   WBC 4.8 - 10.8 K/uL 6.8   RBC 4.20 - 5.40 M/uL 4.79   Hemoglobin 12.0 - 16.0 g/dL 14.1   Hematocrit 37.0 - 47.0 % 43.0   MCV 81.4 - 97.8 fL 89.8   MCH 27.0 - 33.0 pg 29.4   MCHC 32.2 - 35.5 g/dL 32.8   RDW 35.9 - 50.0 fL 43.6   Platelet Count 164 - 446 K/uL 282   MPV 9.0 - 12.9 fL 10.4   Neutrophils-Polys 44.00 - 72.00 % 54.30   Neutrophils (Absolute) 1.82 - 7.42 K/uL 3.67   Lymphocytes 22.00 - 41.00 % 33.90   Lymphs (Absolute) 1.00 - 4.80 K/uL 2.29   Monocytes 0.00 - 13.40 % 9.30   Monos (Absolute) 0.00 - 0.85 K/uL 0.63   Eosinophils 0.00 - 6.90 % 1.30   Eos (Absolute) 0.00 - 0.51 K/uL 0.09   Basophils 0.00 - 1.80 % 0.90   Baso (Absolute) 0.00 - 0.12 K/uL 0.06   Immature Granulocytes 0.00 - 0.90 % 0.30   Immature Granulocytes (abs) 0.00 - 0.11 " K/uL 0.02   Nucleated RBC 0.00 - 0.20 /100 WBC 0.00   NRBC (Absolute) K/uL 0.00      Latest Reference Range & Units 01/12/24 09:06   Sodium 135 - 145 mmol/L 141   Potassium 3.6 - 5.5 mmol/L 4.2   Chloride 96 - 112 mmol/L 102   Co2 20 - 33 mmol/L 26   Anion Gap 7.0 - 16.0  13.0   Glucose 65 - 99 mg/dL 93   Bun 8 - 22 mg/dL 15   Creatinine 0.50 - 1.40 mg/dL 0.61   GFR (CKD-EPI) >60 mL/min/1.73 m 2 96   Calcium 8.4 - 10.2 mg/dL 9.4   Correct Calcium 8.5 - 10.5 mg/dL 8.8   AST(SGOT) 12 - 45 U/L 27   ALT(SGPT) 2 - 50 U/L 23   Alkaline Phosphatase 30 - 99 U/L 65   Total Bilirubin 0.1 - 1.5 mg/dL 0.5   Albumin 3.2 - 4.9 g/dL 4.7   Total Protein 6.0 - 8.2 g/dL 6.9   Globulin 1.9 - 3.5 g/dL 2.2   A-G Ratio g/dL 2.1         ASSESSMENT/PLAN:    Joanie was seen today for medical clearance.    Diagnoses and all orders for this visit:    Preop general physical exam  -     CBC WITH DIFFERENTIAL; Future  -     EKG - Clinic Performed    2/1/2024  1:44 PM   EKG Interpretation   To my reading today:  Rhythm: sinus rhythm  Rate: 61    Axis: P 69, QRS 5, T 26    Ectopy: none   Conduction: , QRSD 99, , QTc 432    ST Segments: unremarkable   T Waves: unremarkable  Q Waves: none   Clinical Impression: normal EKG    Enio Index for assessing perioperative cardiovascular risk (Revised Cardiac Risk Index) [Circulation 1999;100:1047]: 3.9% cardiovascular risks    ARISCAT (Canet) preoperative pulmonary risk index in adults    Total score: 3   Low risk: 1.6% pulmonary complication rate     ARISCAT risk index interpretation      0 to 25 points: Low risk: 1.6% pulmonary complication rate   26 to 44 points: Intermediate risk: 13.3% pulmonary complication rate   45 to 123 points: High risk: 42.1% pulmonary complication rate     She has low cardiovascular and pulmonary risk for her right knee arthroscopic procedure. She understands the risks and would like to proceed with the surgery.

## 2024-03-08 ENCOUNTER — PATIENT MESSAGE (OUTPATIENT)
Dept: CARDIOLOGY | Facility: MEDICAL CENTER | Age: 69
End: 2024-03-08
Payer: MEDICARE

## 2024-03-08 DIAGNOSIS — I10 ESSENTIAL HYPERTENSION, BENIGN: ICD-10-CM

## 2024-03-08 DIAGNOSIS — Z82.49 FAMILY HISTORY OF EARLY CAD: ICD-10-CM

## 2024-03-08 DIAGNOSIS — R07.9 CHEST PAIN, UNSPECIFIED TYPE: ICD-10-CM

## 2024-03-08 RX ORDER — CARVEDILOL 6.25 MG/1
6.25 TABLET ORAL 2 TIMES DAILY WITH MEALS
Qty: 180 TABLET | Refills: 0 | Status: SHIPPED | OUTPATIENT
Start: 2024-03-08

## 2024-03-14 ENCOUNTER — HOSPITAL ENCOUNTER (OUTPATIENT)
Dept: LAB | Facility: MEDICAL CENTER | Age: 69
End: 2024-03-14
Attending: INTERNAL MEDICINE
Payer: MEDICARE

## 2024-03-14 LAB
CHOLEST SERPL-MCNC: 169 MG/DL (ref 100–199)
FASTING STATUS PATIENT QL REPORTED: NORMAL
HDLC SERPL-MCNC: 50 MG/DL
LDLC SERPL CALC-MCNC: 93 MG/DL
TRIGL SERPL-MCNC: 130 MG/DL (ref 0–149)

## 2024-03-14 PROCEDURE — 80061 LIPID PANEL: CPT

## 2024-03-14 PROCEDURE — 36415 COLL VENOUS BLD VENIPUNCTURE: CPT

## 2024-03-20 ENCOUNTER — NON-PROVIDER VISIT (OUTPATIENT)
Dept: VASCULAR LAB | Facility: MEDICAL CENTER | Age: 69
End: 2024-03-20
Attending: INTERNAL MEDICINE
Payer: MEDICARE

## 2024-03-20 DIAGNOSIS — E78.49 FAMILIAL HYPERLIPIDEMIA: ICD-10-CM

## 2024-03-20 PROCEDURE — 99212 OFFICE O/P EST SF 10 MIN: CPT

## 2024-03-20 RX ORDER — MELOXICAM 15 MG/1
15 TABLET ORAL DAILY
COMMUNITY

## 2024-03-20 NOTE — PROGRESS NOTES
[facility] - FollowUp Visit    Joanie Arenas is here for follow up of dyslipidemia.    Subjective    HPI  Pertinent Interval History since last visit:   Medications have decreased in price  Current Prescription Lipid Lowering Medications - including dose:   Statin: None  Non-Statin: Repatha 420mg SQ once per month  Nexlizet 180/10mg QD  Current Lipid Lowering and Related Supplements:   Vitamin D  Any Current Side Effects Potentially Related to Lipid Lowering therapy?   No  Current Adherence to Lipid Lowering Therapies:  Partial compliance  Previously Attempted Interventions for Lipids - including outcome  Statin: Rosuvastatin, Atorvastatin, Pravastatin- she thinks she has tried them all since she has been on statin for at least 30+ years              Outcome:  having leg cramps, and taken off statins, but leg cramps persisted. Not true intolerance? Unsure, but CK values elevated so she remained off of statin  Non-Statin: None              Outcome: N/A  Any Previous History of Statin Intolerance?   Yes, Details: patient has been on statins for 30+ years, but   Baseline Lipids Prior to Treatment on Statin alone     Latest Reference Range & Units 06/20/18 10:19   Cholesterol,Tot 100 - 199 mg/dL 274 (H)   Triglycerides 0 - 149 mg/dL 133   HDL >=40 mg/dL 52   LDL <100 mg/dL 195 (H)   However, the patient has been on Repatha 140mg Q14d and Ezetimibe 10mg QD since Sept 2021 and those labs are below:     Latest Reference Range & Units 09/08/21 08:04   Cholesterol,Tot 100 - 199 mg/dL 238 (H)   Triglycerides 0 - 149 mg/dL 214 (H)   HDL >=40 mg/dL 47   LDL <100 mg/dL 148 (H)   (H): Data is abnormally high     Other Pertinent History & CV risk factors: Patient has elevated CAC score   Coronary calcification:  (previous CAC score 27 in 2017)  LMA - 26.3  LCX - 17.9  LAD - 96.0  RCA - 82.4  PDA - 0.0     Total Calcium Score: 222.7    SOCIAL HISTORY  Social History     Tobacco Use   Smoking Status Never   Smokeless  Tobacco Never      Change in weight: Has lost ~20 lbs since Xmas time  Exercise habits: minimal exercise-knee injury wanting to get back into it.  Diet: has started Dash Diet plan since first of the year, continues to lose weight      Objective    There were no vitals filed for this visit.       DATA REVIEW  Most Recent Lipid Panel:   Lab Results   Component Value Date    CHOLSTRLTOT 169 03/14/2024    TRIGLYCERIDE 130 03/14/2024    HDL 50 03/14/2024    LDL 93 03/14/2024       Other Pertinent Blood Work:   Lab Results   Component Value Date    SODIUM 141 01/12/2024    POTASSIUM 4.2 01/12/2024    CHLORIDE 102 01/12/2024    CO2 26 01/12/2024    ANION 13.0 01/12/2024    GLUCOSE 93 01/12/2024    BUN 15 01/12/2024    CREATININE 0.61 01/12/2024    CALCIUM 9.4 01/12/2024    ASTSGOT 27 01/12/2024    ALTSGPT 23 01/12/2024    ALKPHOSPHAT 65 01/12/2024    TBILIRUBIN 0.5 01/12/2024    ALBUMIN 4.7 01/12/2024    AGRATIO 2.1 01/12/2024    LIPOPROTA 9 01/04/2023    TSHULTRASEN 2.430 01/12/2024    CPKTOTAL 150 12/08/2023       Other:  NA    Recent Imaging Studies:    None          ASSESSMENT AND PLAN  Patient Type, check all that apply:   Primary Prevention  Established Atherosclerotic Cardiovascular Disease (ASCVD)  Yes, Details: pt has elevated CAC score of 222.7  and HTN  Other Established (non-atherosclerotic) Vascular Disease, if Present:  None  Evidence of Heterozygous Familial Hypercholesterolemia (FH):   Yes,   ACC/AHA Indication for Statin Therapy, alma all that apply:  Established ASCVD: Indication for High intensity statin   Calculated Risk for ASCVD, if applicable    N/A  Other Significant Risk Markers, if any, alma all that apply   elevated coronary calcium score and Family history of premature ASCVD in first degree relative  Goal LDL-C and nonHDL-C based on Clinic Protocol  LDL-C <100 or could be argued to be <70.  Lifestyle Recommendations From Today’s Visit:    Eating Plan: Concentrate on  Low sat/Trans fat, Low  simple carb, and DASH/Med Style diet and Exercise: continue walking and increase time and intensity as tolerated  Statin Therapy Recommendations from Today’s Visit:   None  Non-Statin Medications Recommendations from Today’s Visit:   Continue Nexlizet 180/10mg QD  Indication for PCSK9 Inhibitor, if applicable:  FH with suboptimal control of LDL-C despite maximally tolerated statin - Continue with Repatha 420mg SQ once per month  Supplements Recommended at this visit:   Continue with Vit D 2000 units   Recommendations for Other Cardiovascular Risk Factors, alma all that apply:   Hypertension- well controlled    Patient seen today for follow up.  LDL-C and non-HDL- C are within goal, patient has no issues obtaining medications at this time. Plans to meet with Dr Jose in May. Tolerating all medications without issue. Plans to increase exercise as her knee heals.     Studies Ordered at Todays Visit:  None   Blood Work Ordered At Today’s visit:   Lipid panel   CMP  Follow-Up:   4 months    Aminta Hickman, ShanaeD      CC:  Jason K Wong, D.O. Radulescu, Vlad, M.D. Michael Bloch MD

## 2024-06-05 DIAGNOSIS — I10 ESSENTIAL HYPERTENSION, BENIGN: ICD-10-CM

## 2024-06-05 DIAGNOSIS — Z82.49 FAMILY HISTORY OF EARLY CAD: ICD-10-CM

## 2024-06-05 DIAGNOSIS — R07.9 CHEST PAIN, UNSPECIFIED TYPE: ICD-10-CM

## 2024-06-05 NOTE — TELEPHONE ENCOUNTER
Is the patient due for a refill? Yes    Was the patient seen the past year? No    Date of last office visit: 5.19.2023    Does the patient have an upcoming appointment?  Yes   If yes, When? 6.27.24    Provider to refill:VR    Does the patients insurance require a 100 day supply?  No

## 2024-06-06 RX ORDER — CARVEDILOL 6.25 MG/1
6.25 TABLET ORAL 2 TIMES DAILY WITH MEALS
Qty: 46 TABLET | Refills: 0 | Status: SHIPPED | OUTPATIENT
Start: 2024-06-06

## 2024-06-12 DIAGNOSIS — E78.49 FAMILIAL HYPERLIPIDEMIA: ICD-10-CM

## 2024-06-13 ENCOUNTER — TELEPHONE (OUTPATIENT)
Dept: CARDIOLOGY | Facility: MEDICAL CENTER | Age: 69
End: 2024-06-13
Payer: MEDICARE

## 2024-06-13 DIAGNOSIS — E78.49 FAMILIAL HYPERLIPIDEMIA: ICD-10-CM

## 2024-06-13 RX ORDER — EVOLOCUMAB 140 MG/ML
140 INJECTION, SOLUTION SUBCUTANEOUS
Qty: 6 EACH | Refills: 3 | Status: SHIPPED | OUTPATIENT
Start: 2024-06-13

## 2024-06-13 RX ORDER — EVOLOCUMAB 420 MG/3.5
KIT SUBCUTANEOUS
Qty: 10.5 ML | Refills: 3 | Status: SHIPPED
Start: 2024-06-13 | End: 2024-06-13

## 2024-06-13 NOTE — TELEPHONE ENCOUNTER
Gary Israel,    I received an Erx renewal for this patient's Repatha Pushtronex 420, and I wanted to make sure you were aware that the Repatha Pushtronex has been discontinued by the  as of this month, and pharmacies will no longer be able to order or dispense it anymore.     The pre-filled syringes will be next for discontinuation in 2025, and the Repatha Sureclick 140 will be the only option for continued Repatha therapy going forward.     Please let me know if there is anything I can do to assist with this patient going forward.     Thank you, and I hope you have a great day!    Tess MACIEL  Rx Coordinator

## 2024-06-13 NOTE — TELEPHONE ENCOUNTER
Notified by [facility] that Pushtronex is no longer available.  Sent RX for Repatha Sureclick 140mg q 14 days to [facility].  LVM with pt and sent MCM.  Lindy Mejia, PharmD  CC [facility]

## 2024-06-14 ENCOUNTER — TELEPHONE (OUTPATIENT)
Dept: VASCULAR LAB | Facility: MEDICAL CENTER | Age: 69
End: 2024-06-14
Payer: MEDICARE

## 2024-06-14 NOTE — TELEPHONE ENCOUNTER
[facility]    Pt called back regarding pushtronix system.  Pt aware she will move to the repatha pen and is agreeable to the plan. F/U in 1.5 months for lipid control.    Nicolás Sofia, PharmD

## 2024-06-21 DIAGNOSIS — I10 ESSENTIAL HYPERTENSION, BENIGN: ICD-10-CM

## 2024-06-21 RX ORDER — AMLODIPINE BESYLATE 5 MG/1
TABLET ORAL
Qty: 90 TABLET | Refills: 0 | Status: SHIPPED | OUTPATIENT
Start: 2024-06-21

## 2024-06-21 RX ORDER — LISINOPRIL 40 MG/1
TABLET ORAL
Qty: 90 TABLET | Refills: 0 | Status: SHIPPED | OUTPATIENT
Start: 2024-06-21

## 2024-06-24 DIAGNOSIS — T75.3XXA MOTION SICKNESS, INITIAL ENCOUNTER: ICD-10-CM

## 2024-06-24 RX ORDER — SCOLOPAMINE TRANSDERMAL SYSTEM 1 MG/1
1 PATCH, EXTENDED RELEASE TRANSDERMAL
Qty: 4 PATCH | Refills: 0 | Status: SHIPPED | OUTPATIENT
Start: 2024-06-24

## 2024-06-25 NOTE — PROGRESS NOTES
Motion sickness, initial encounter  -     scopolamine (TRANSDERM SCOP, 1.5 MG,) 1 mg/72hr PATCH 72 HR; Place 1 Patch on the skin every 72 hours as needed (Motion sickness prevention. at least 4 hours prior to required antiemetic effect).

## 2024-06-27 ENCOUNTER — OFFICE VISIT (OUTPATIENT)
Dept: CARDIOLOGY | Facility: MEDICAL CENTER | Age: 69
End: 2024-06-27
Attending: INTERNAL MEDICINE
Payer: MEDICARE

## 2024-06-27 VITALS
RESPIRATION RATE: 12 BRPM | WEIGHT: 169 LBS | SYSTOLIC BLOOD PRESSURE: 116 MMHG | OXYGEN SATURATION: 96 % | BODY MASS INDEX: 28.85 KG/M2 | HEART RATE: 60 BPM | DIASTOLIC BLOOD PRESSURE: 66 MMHG | HEIGHT: 64 IN

## 2024-06-27 DIAGNOSIS — I10 ESSENTIAL HYPERTENSION, BENIGN: ICD-10-CM

## 2024-06-27 DIAGNOSIS — I25.10 CORONARY ARTERY CALCIFICATION SEEN ON COMPUTED TOMOGRAPHY: ICD-10-CM

## 2024-06-27 DIAGNOSIS — E78.49 FAMILIAL HYPERLIPIDEMIA: ICD-10-CM

## 2024-06-27 PROCEDURE — 99213 OFFICE O/P EST LOW 20 MIN: CPT | Performed by: INTERNAL MEDICINE

## 2024-06-27 ASSESSMENT — ENCOUNTER SYMPTOMS
CONSTIPATION: 0
ABDOMINAL PAIN: 0
FEVER: 0
DIZZINESS: 0
ORTHOPNEA: 0
FLANK PAIN: 0
PALPITATIONS: 0
HEARTBURN: 0
CLAUDICATION: 0
PND: 0
DYSPNEA ON EXERTION: 0
DEPRESSION: 0
WEIGHT GAIN: 0
NEAR-SYNCOPE: 0
WEIGHT LOSS: 0
ALTERED MENTAL STATUS: 0
MUSCLE CRAMPS: 0
SYNCOPE: 0
BLURRED VISION: 0
NAUSEA: 0
IRREGULAR HEARTBEAT: 0
SHORTNESS OF BREATH: 0
VOMITING: 0
DECREASED APPETITE: 0
COUGH: 0
BACK PAIN: 0
DIARRHEA: 0

## 2024-06-27 ASSESSMENT — FIBROSIS 4 INDEX: FIB4 SCORE: 1.42

## 2024-06-27 NOTE — PROGRESS NOTES
Cardiology Note    Chief Complaint   Patient presents with    Hyperlipidemia     F/V Dx: Familial hyperlipidemia    Hypertension     F/V Dx: Essential hypertension, benign         History of Present Illness: Joanie Arenas is a 69 y.o. female PMH familial HLD ( in 2015), CAC on CT, HTN who presents for follow up.    Continues to do well. No active cardiac complaints. Compliant with medications and denies adverse effects. Follows with pharmacotherapy for lipid control. Patient states getting her repatha dosage changed due to discontinuation of prior.    Review of Systems   Constitutional: Negative for decreased appetite, fever, malaise/fatigue, weight gain and weight loss.   HENT:  Negative for congestion and nosebleeds.    Eyes:  Negative for blurred vision.   Cardiovascular:  Negative for chest pain, claudication, dyspnea on exertion, irregular heartbeat, leg swelling, near-syncope, orthopnea, palpitations, paroxysmal nocturnal dyspnea and syncope.   Respiratory:  Negative for cough and shortness of breath.    Endocrine: Negative for cold intolerance and heat intolerance.   Skin:  Negative for rash.   Musculoskeletal:  Negative for back pain and muscle cramps.   Gastrointestinal:  Negative for abdominal pain, constipation, diarrhea, heartburn, melena, nausea and vomiting.   Genitourinary:  Negative for dysuria, flank pain and hematuria.   Neurological:  Negative for dizziness.   Psychiatric/Behavioral:  Negative for altered mental status and depression.          Past Medical History:   Diagnosis Date    Arthritis 2014    hands and back    Benign hypertension 2014    well controlled on meds pt states increased with anxiety    Cold 2014 01/20 end of cold symptoms    Hypercholesteremia     Pain 2014    6/10         Past Surgical History:   Procedure Laterality Date    FUSION, SPINE, LUMBAR, PLIF  1/29/2014    Performed by Maci Babb M.D. at SUR[facility]ERY [facility]    LUMBAR LAMINECTOMY  DISKECTOMY  2014    Performed by Maci Babb M.D. at SURGERY [facility]    LUMBAR DECOMPRESSION  2014    Performed by Maci Babb M.D. at SURGERY [facility]    HYSTERECTOMY, TOTAL ABDOMINAL      TONSILLECTOMY           Current Outpatient Medications   Medication Sig Dispense Refill    CALCIUM PO Take  by mouth.      scopolamine (TRANSDERM SCOP, 1.5 MG,) 1 mg/72hr PATCH 72 HR Place 1 Patch on the skin every 72 hours as needed (Motion sickness prevention. at least 4 hours prior to required antiemetic effect). 4 Patch 0    amLODIPine (NORVASC) 5 MG Tab TAKE ONE TABLET BY MOUTH IN THE EVENING 90 Tablet 0    lisinopril (PRINIVIL) 40 MG tablet TAKE ONE TABLET BY MOUTH IN THE EVENING 90 Tablet 0    Evolocumab (REPATHA SURECLICK) 140 MG/ML Solution Auto-injector SubQ injection pen Inject 1 mL under the skin every 14 days. 6 Each 3    carvedilol (COREG) 6.25 MG Tab TAKE ONE TABLET BY MOUTH TWICE DAILY WITH MEALS 46 Tablet 0    cephALEXin (KEFLEX) 250 MG Cap 1 PO post-coital PRN      NEXLIZET 180-10 MG Tab TAKE ONE TABLET BY MOUTH ONE TIME DAILY 90 Tablet 3    estradiol (ESTRACE) 0.1 MG/GM vaginal cream       Vitamin D, Ergocalciferol, 50 MCG (2000 UT) Cap Vitamin D      aspirin EC (ECOTRIN) 81 MG Tablet Delayed Response Take 1 tablet by mouth every day. 90 tablet 3     No current facility-administered medications for this visit.         No Known Allergies      Family History   Problem Relation Age of Onset    Heart Disease Father          from MI at age 32    Hyperlipidemia Father     GI Disease Mother         GERD    Hypertension Mother     Hyperlipidemia Daughter     Hyperlipidemia Grandchild          Social History     Socioeconomic History    Marital status:      Spouse name: Not on file    Number of children: Not on file    Years of education: Not on file    Highest education level: Not on file   Occupational History    Not on file   Tobacco Use    Smoking status: Never     "Smokeless tobacco: Never   Vaping Use    Vaping status: Never Used   Substance and Sexual Activity    Alcohol use: Yes     Alcohol/week: 1.8 oz     Types: 3 Cans of beer per week     Comment: 2-3 per week    Drug use: Never    Sexual activity: Yes     Partners: Male   Other Topics Concern    Not on file   Social History Narrative    Not on file     Social Determinants of Health     Financial Resource Strain: Not on file   Food Insecurity: Not on file   Transportation Needs: Not on file   Physical Activity: Not on file   Stress: Not on file   Social Connections: Not on file   Intimate Partner Violence: Not on file   Housing Stability: Not on file         Physical Exam:  Ambulatory Vitals  /66 (BP Location: Left arm, Patient Position: Sitting, BP Cuff Size: Adult)   Pulse 60   Resp 12   Ht 1.626 m (5' 4\")   Wt 76.7 kg (169 lb)   SpO2 96%    BP Readings from Last 4 Encounters:   06/27/24 116/66   02/01/24 130/72   12/08/23 (!) 154/99   11/10/23 128/78     Weight/BMI:   Vitals:    06/27/24 0904   BP: 116/66   Weight: 76.7 kg (169 lb)   Height: 1.626 m (5' 4\")    Body mass index is 29.01 kg/m².  Wt Readings from Last 4 Encounters:   06/27/24 76.7 kg (169 lb)   02/01/24 76.2 kg (168 lb)   12/08/23 82.7 kg (182 lb 5.1 oz)   11/10/23 82.6 kg (182 lb)       Physical Exam  Constitutional:       General: She is not in acute distress.  HENT:      Head: Normocephalic and atraumatic.   Eyes:      Conjunctiva/sclera: Conjunctivae normal.      Pupils: Pupils are equal, round, and reactive to light.   Neck:      Vascular: No JVD.   Cardiovascular:      Rate and Rhythm: Normal rate and regular rhythm.      Heart sounds: Normal heart sounds. No murmur heard.     No friction rub. No gallop.   Pulmonary:      Effort: Pulmonary effort is normal. No respiratory distress.      Breath sounds: Normal breath sounds. No wheezing or rales.   Chest:      Chest wall: No tenderness.   Abdominal:      General: Bowel sounds are normal. " "There is no distension.      Palpations: Abdomen is soft.   Musculoskeletal:      Cervical back: Normal range of motion and neck supple.   Skin:     General: Skin is warm and dry.   Neurological:      Mental Status: She is alert and oriented to person, place, and time.   Psychiatric:         Mood and Affect: Affect normal.         Judgment: Judgment normal.         Lab Data Review:  Lab Results   Component Value Date/Time    CHOLSTRLTOT 169 03/14/2024 08:27 AM    LDL 93 03/14/2024 08:27 AM    HDL 50 03/14/2024 08:27 AM    TRIGLYCERIDE 130 03/14/2024 08:27 AM       Lab Results   Component Value Date/Time    SODIUM 141 01/12/2024 09:06 AM    POTASSIUM 4.2 01/12/2024 09:06 AM    CHLORIDE 102 01/12/2024 09:06 AM    CO2 26 01/12/2024 09:06 AM    GLUCOSE 93 01/12/2024 09:06 AM    BUN 15 01/12/2024 09:06 AM    CREATININE 0.61 01/12/2024 09:06 AM    CREATININE 0.7 05/04/2007 10:30 AM    BUNCREATRAT 21 05/08/2017 08:27 AM     CrCl cannot be calculated (Patient's most recent lab result is older than the maximum 7 days allowed.).  Lab Results   Component Value Date/Time    ALKPHOSPHAT 65 01/12/2024 09:06 AM    ASTSGOT 27 01/12/2024 09:06 AM    ALTSGPT 23 01/12/2024 09:06 AM    TBILIRUBIN 0.5 01/12/2024 09:06 AM      Lab Results   Component Value Date/Time    WBC 6.8 02/01/2024 02:38 PM     Lab Results   Component Value Date/Time    HBA1C 5.6 03/10/2022 08:25 AM     No components found for: \"TROP\"      Cardiac Imaging and Procedures Review:      TTE 7/2012  Left Ventricle   Normal left ventricular size and function. Normal left ventricular wall   thickness. Normal left ventricular systolic function. Grade II   diastolic dysfunction is suggested by inflow Doppler and TDI but LA   dimension is normal. Left ventricular ejection fraction is 60% to 65%.   Normal regional wall motion.   CONCLUSIONS   Normal left ventricular size and function.   Very mild aortic valve leaflet thickening without significant stenosis   or regurgitation. "   Trace mitral regurgitation.     Stress echo 2012  CONCLUSIONS   Normal stress echocardiogram.     CAC CT 2017  Coronary calcification:  LMA - 0.0  LCX - 0.0  LAD - 17.4  RCA - 9.6  PDA - 0.0  Calcium score:  27.0    CAC CT 12/2022  Coronary calcification:  LMA - 26.3  LCX - 17.9  LAD - 96.0  RCA - 82.4  PDA - 0.0  Total Calcium Score: 222.7    Stress echo 2/2023  CONCLUSIONS  Negative stress echocardiogram for ischemia with adequate stress   achieved by heart rate criteria.   The global left ventricular systolic function improved with stress.   Occasional PVCs increased with stress improved with rest. No sustained   arrhythmias.  Hypertensive blood pressure response to exercise    Medical Decision Making:  Problem List Items Addressed This Visit       Essential hypertension, benign    Familial hyperlipidemia    Coronary artery calcification seen on computed tomography       HTN - goal <130/80. Controlled. Continue regimen.    Familial HLD - controlled. Continue pcsk9i. Continue following pharmacotherapy.    CAC on CT - agatston 222. Continue aspirin and pcsk9i.    It was my pleasure to meet with Ms. Arenas.

## 2024-06-30 DIAGNOSIS — Z82.49 FAMILY HISTORY OF EARLY CAD: ICD-10-CM

## 2024-06-30 DIAGNOSIS — I10 ESSENTIAL HYPERTENSION, BENIGN: ICD-10-CM

## 2024-06-30 DIAGNOSIS — R07.9 CHEST PAIN, UNSPECIFIED TYPE: ICD-10-CM

## 2024-07-03 RX ORDER — CARVEDILOL 6.25 MG/1
6.25 TABLET ORAL 2 TIMES DAILY WITH MEALS
Qty: 180 TABLET | Refills: 3 | Status: SHIPPED | OUTPATIENT
Start: 2024-07-03

## 2024-07-12 ENCOUNTER — HOSPITAL ENCOUNTER (OUTPATIENT)
Dept: LAB | Facility: MEDICAL CENTER | Age: 69
End: 2024-07-12
Payer: MEDICARE

## 2024-07-12 DIAGNOSIS — E78.49 FAMILIAL HYPERLIPIDEMIA: ICD-10-CM

## 2024-07-12 LAB
ALBUMIN SERPL BCP-MCNC: 4.7 G/DL (ref 3.2–4.9)
ALBUMIN/GLOB SERPL: 1.7 G/DL
ALP SERPL-CCNC: 77 U/L (ref 30–99)
ALT SERPL-CCNC: 16 U/L (ref 2–50)
ANION GAP SERPL CALC-SCNC: 10 MMOL/L (ref 7–16)
AST SERPL-CCNC: 22 U/L (ref 12–45)
BILIRUB SERPL-MCNC: 0.5 MG/DL (ref 0.1–1.5)
BUN SERPL-MCNC: 17 MG/DL (ref 8–22)
CALCIUM ALBUM COR SERPL-MCNC: 8.8 MG/DL (ref 8.5–10.5)
CALCIUM SERPL-MCNC: 9.4 MG/DL (ref 8.4–10.2)
CHLORIDE SERPL-SCNC: 106 MMOL/L (ref 96–112)
CHOLEST SERPL-MCNC: 149 MG/DL (ref 100–199)
CO2 SERPL-SCNC: 27 MMOL/L (ref 20–33)
CREAT SERPL-MCNC: 0.72 MG/DL (ref 0.5–1.4)
FASTING STATUS PATIENT QL REPORTED: NORMAL
GFR SERPLBLD CREATININE-BSD FMLA CKD-EPI: 89 ML/MIN/1.73 M 2
GLOBULIN SER CALC-MCNC: 2.7 G/DL (ref 1.9–3.5)
GLUCOSE SERPL-MCNC: 95 MG/DL (ref 65–99)
HDLC SERPL-MCNC: 48 MG/DL
LDLC SERPL CALC-MCNC: 75 MG/DL
POTASSIUM SERPL-SCNC: 4.5 MMOL/L (ref 3.6–5.5)
PROT SERPL-MCNC: 7.4 G/DL (ref 6–8.2)
SODIUM SERPL-SCNC: 143 MMOL/L (ref 135–145)
TRIGL SERPL-MCNC: 128 MG/DL (ref 0–149)

## 2024-07-12 PROCEDURE — 36415 COLL VENOUS BLD VENIPUNCTURE: CPT

## 2024-07-12 PROCEDURE — 80053 COMPREHEN METABOLIC PANEL: CPT

## 2024-07-12 PROCEDURE — 80061 LIPID PANEL: CPT

## 2024-07-25 ENCOUNTER — NON-PROVIDER VISIT (OUTPATIENT)
Dept: VASCULAR LAB | Facility: MEDICAL CENTER | Age: 69
End: 2024-07-25
Attending: INTERNAL MEDICINE
Payer: MEDICARE

## 2024-07-25 DIAGNOSIS — E78.49 FAMILIAL HYPERLIPIDEMIA: Primary | ICD-10-CM

## 2024-07-25 PROCEDURE — 99212 OFFICE O/P EST SF 10 MIN: CPT

## 2024-09-04 ENCOUNTER — APPOINTMENT (RX ONLY)
Dept: URBAN - METROPOLITAN AREA CLINIC 15 | Facility: CLINIC | Age: 69
Setting detail: DERMATOLOGY
End: 2024-09-04

## 2024-09-04 DIAGNOSIS — D22 MELANOCYTIC NEVI: ICD-10-CM

## 2024-09-04 DIAGNOSIS — L30.8 OTHER SPECIFIED DERMATITIS: ICD-10-CM | Status: INADEQUATELY CONTROLLED

## 2024-09-04 DIAGNOSIS — D18.0 HEMANGIOMA: ICD-10-CM

## 2024-09-04 DIAGNOSIS — L82.1 OTHER SEBORRHEIC KERATOSIS: ICD-10-CM

## 2024-09-04 DIAGNOSIS — L57.3 POIKILODERMA OF CIVATTE: ICD-10-CM

## 2024-09-04 DIAGNOSIS — L81.4 OTHER MELANIN HYPERPIGMENTATION: ICD-10-CM

## 2024-09-04 DIAGNOSIS — Z71.89 OTHER SPECIFIED COUNSELING: ICD-10-CM

## 2024-09-04 PROBLEM — D18.01 HEMANGIOMA OF SKIN AND SUBCUTANEOUS TISSUE: Status: ACTIVE | Noted: 2024-09-04

## 2024-09-04 PROBLEM — D22.5 MELANOCYTIC NEVI OF TRUNK: Status: ACTIVE | Noted: 2024-09-04

## 2024-09-04 PROCEDURE — ? COUNSELING

## 2024-09-04 PROCEDURE — 99214 OFFICE O/P EST MOD 30 MIN: CPT

## 2024-09-04 PROCEDURE — ? PRESCRIPTION

## 2024-09-04 RX ORDER — BETAMETHASONE DIPROPIONATE 0.5 MG/G
OINTMENT TOPICAL
Qty: 45 | Refills: 2 | Status: ERX | COMMUNITY
Start: 2024-09-04

## 2024-09-04 RX ADMIN — BETAMETHASONE DIPROPIONATE: 0.5 OINTMENT TOPICAL at 00:00

## 2024-09-04 ASSESSMENT — LOCATION ZONE DERM
LOCATION ZONE: HAND
LOCATION ZONE: NECK
LOCATION ZONE: FACE
LOCATION ZONE: TRUNK

## 2024-09-04 ASSESSMENT — ITCH NUMERIC RATING SCALE: HOW SEVERE IS YOUR ITCHING?: 2

## 2024-09-04 ASSESSMENT — LOCATION SIMPLE DESCRIPTION DERM
LOCATION SIMPLE: LEFT UPPER BACK
LOCATION SIMPLE: LEFT ANTERIOR NECK
LOCATION SIMPLE: LEFT CHEEK
LOCATION SIMPLE: RIGHT LOWER BACK
LOCATION SIMPLE: CHEST
LOCATION SIMPLE: LEFT HAND

## 2024-09-04 ASSESSMENT — LOCATION DETAILED DESCRIPTION DERM
LOCATION DETAILED: LEFT THENAR EMINENCE
LOCATION DETAILED: RIGHT SUPERIOR LATERAL MIDBACK
LOCATION DETAILED: LEFT INFERIOR ANTERIOR NECK
LOCATION DETAILED: LEFT CENTRAL MALAR CHEEK
LOCATION DETAILED: LEFT INFERIOR UPPER BACK
LOCATION DETAILED: RIGHT MEDIAL INFERIOR CHEST

## 2024-09-04 ASSESSMENT — SEVERITY ASSESSMENT: SEVERITY: MODERATE

## 2024-09-04 ASSESSMENT — BSA RASH: BSA RASH: 1

## 2024-09-18 DIAGNOSIS — I10 ESSENTIAL HYPERTENSION, BENIGN: ICD-10-CM

## 2024-09-18 RX ORDER — LISINOPRIL 40 MG/1
TABLET ORAL
Qty: 90 TABLET | Refills: 3 | Status: SHIPPED | OUTPATIENT
Start: 2024-09-18

## 2024-09-18 NOTE — TELEPHONE ENCOUNTER
Is the patient due for a refill? Yes    Was the patient seen the past year? Yes    Date of last office visit: 06.27.2024    Does the patient have an upcoming appointment?  No    Provider to refill:VR    Does the patient have senior living [facility] and need 100-day supply? (This applies to ALL medications) Patient does not have SCP

## 2024-09-26 DIAGNOSIS — I10 ESSENTIAL HYPERTENSION, BENIGN: ICD-10-CM

## 2024-09-26 NOTE — TELEPHONE ENCOUNTER
Is the patient due for a refill? Yes    Was the patient seen the past year? Yes    Date of last office visit: 6.27.2024    Does the patient have an upcoming appointment?  No     Provider to refill:VR    Does the patient have [facility] living [facility] and need 100-day supply? (This applies to ALL medications) Patient does not have SCP

## 2024-09-27 RX ORDER — AMLODIPINE BESYLATE 5 MG/1
TABLET ORAL
Qty: 90 TABLET | Refills: 2 | Status: SHIPPED | OUTPATIENT
Start: 2024-09-27

## 2024-11-05 DIAGNOSIS — E78.49 FAMILIAL HYPERLIPIDEMIA: ICD-10-CM

## 2024-11-05 RX ORDER — BEMPEDOIC ACID AND EZETIMIBE 180; 10 MG/1; MG/1
1 TABLET, FILM COATED ORAL DAILY
Qty: 90 TABLET | Refills: 1 | Status: SHIPPED | OUTPATIENT
Start: 2024-11-05 | End: 2024-11-08

## 2024-11-08 DIAGNOSIS — E78.49 FAMILIAL HYPERLIPIDEMIA: ICD-10-CM

## 2024-11-08 RX ORDER — BEMPEDOIC ACID AND EZETIMIBE 180; 10 MG/1; MG/1
1 TABLET, FILM COATED ORAL DAILY
Qty: 30 TABLET | Refills: 0 | Status: SHIPPED | OUTPATIENT
Start: 2024-11-08 | End: 2024-11-12

## 2024-11-11 DIAGNOSIS — E78.49 FAMILIAL HYPERLIPIDEMIA: ICD-10-CM

## 2024-11-12 ENCOUNTER — APPOINTMENT (OUTPATIENT)
Dept: MEDICAL GROUP | Facility: IMAGING CENTER | Age: 69
End: 2024-11-12
Payer: MEDICARE

## 2024-11-12 VITALS
HEIGHT: 64 IN | TEMPERATURE: 98.2 F | SYSTOLIC BLOOD PRESSURE: 124 MMHG | WEIGHT: 179 LBS | RESPIRATION RATE: 16 BRPM | DIASTOLIC BLOOD PRESSURE: 78 MMHG | HEART RATE: 62 BPM | BODY MASS INDEX: 30.56 KG/M2 | OXYGEN SATURATION: 94 %

## 2024-11-12 DIAGNOSIS — E66.9 OBESITY (BMI 30-39.9): ICD-10-CM

## 2024-11-12 DIAGNOSIS — Z12.31 ENCOUNTER FOR SCREENING MAMMOGRAM FOR BREAST CANCER: ICD-10-CM

## 2024-11-12 DIAGNOSIS — I10 ESSENTIAL HYPERTENSION, BENIGN: ICD-10-CM

## 2024-11-12 DIAGNOSIS — Z78.0 OSTEOPENIA AFTER MENOPAUSE: ICD-10-CM

## 2024-11-12 DIAGNOSIS — Z00.00 MEDICARE ANNUAL WELLNESS VISIT, SUBSEQUENT: ICD-10-CM

## 2024-11-12 DIAGNOSIS — M85.80 OSTEOPENIA AFTER MENOPAUSE: ICD-10-CM

## 2024-11-12 DIAGNOSIS — M79.605 BILATERAL LEG PAIN: ICD-10-CM

## 2024-11-12 DIAGNOSIS — E55.9 VITAMIN D INSUFFICIENCY: ICD-10-CM

## 2024-11-12 DIAGNOSIS — R73.01 IMPAIRED FASTING GLUCOSE: ICD-10-CM

## 2024-11-12 DIAGNOSIS — M79.604 BILATERAL LEG PAIN: ICD-10-CM

## 2024-11-12 DIAGNOSIS — M71.21 BAKER'S CYST OF KNEE, RIGHT: ICD-10-CM

## 2024-11-12 PROCEDURE — 3078F DIAST BP <80 MM HG: CPT | Performed by: INTERNAL MEDICINE

## 2024-11-12 PROCEDURE — 3074F SYST BP LT 130 MM HG: CPT | Performed by: INTERNAL MEDICINE

## 2024-11-12 PROCEDURE — G0439 PPPS, SUBSEQ VISIT: HCPCS | Performed by: INTERNAL MEDICINE

## 2024-11-12 RX ORDER — AMLODIPINE BESYLATE 5 MG/1
5 TABLET ORAL EVERY EVENING
Qty: 90 TABLET | Refills: 3 | Status: SHIPPED | OUTPATIENT
Start: 2024-11-12

## 2024-11-12 RX ORDER — CARVEDILOL 6.25 MG/1
6.25 TABLET ORAL 2 TIMES DAILY WITH MEALS
Qty: 180 TABLET | Refills: 3 | Status: SHIPPED | OUTPATIENT
Start: 2024-11-12

## 2024-11-12 RX ORDER — BEMPEDOIC ACID AND EZETIMIBE 180; 10 MG/1; MG/1
1 TABLET, FILM COATED ORAL DAILY
Qty: 90 TABLET | Refills: 3 | Status: SHIPPED | OUTPATIENT
Start: 2024-11-12

## 2024-11-12 RX ORDER — LISINOPRIL 40 MG/1
40 TABLET ORAL EVERY EVENING
Qty: 90 TABLET | Refills: 3 | Status: SHIPPED | OUTPATIENT
Start: 2024-11-12

## 2024-11-12 RX ORDER — ALENDRONATE SODIUM 70 MG/1
70 TABLET ORAL
Qty: 12 TABLET | Refills: 3 | Status: SHIPPED | OUTPATIENT
Start: 2024-11-12

## 2024-11-12 ASSESSMENT — FIBROSIS 4 INDEX: FIB4 SCORE: 1.38

## 2024-11-12 ASSESSMENT — ENCOUNTER SYMPTOMS: GENERAL WELL-BEING: GOOD

## 2024-11-12 ASSESSMENT — PATIENT HEALTH QUESTIONNAIRE - PHQ9: CLINICAL INTERPRETATION OF PHQ2 SCORE: 0

## 2024-11-12 ASSESSMENT — ACTIVITIES OF DAILY LIVING (ADL): BATHING_REQUIRES_ASSISTANCE: 0

## 2024-11-12 NOTE — PROGRESS NOTES
Chief Complaint   Patient presents with    Annual Exam       HPI:  Joanie Arenas is a 69 y.o. here for Medicare Annual Wellness Visit     Patient Active Problem List    Diagnosis Date Noted    Baker's cyst of knee, right 11/12/2024    Deviated nasal septum 11/10/2023    Snorings 11/10/2023    Medicare annual wellness visit, subsequent 09/07/2023    Osteopenia of multiple sites 09/07/2023    Preventative health care 06/15/2022    Heartburn 11/29/2021    Impaired fasting glucose 09/13/2021    Coronary artery calcification seen on computed tomography 07/02/2021    Numbness and tingling of right arm 06/10/2021    Fecal smearing 06/10/2021    Obesity (BMI 30-39.9) 06/10/2021    Gastroesophageal reflux disease without esophagitis 06/10/2021    History of urinary tract infection 09/26/2019    Atrophic vaginitis 09/26/2019    Chronic right shoulder pain 05/10/2017    Digital mucous cyst of toe of right foot 05/10/2017    Elevated CPK 05/11/2016    Bilateral hearing loss 12/29/2015    Pain with urination 12/01/2015    Muscle cramps 07/01/2015    Primary osteoarthritis of both knees 07/01/2015    Left foot pain 06/18/2014    Spinal stenosis, lumbar region, without neurogenic claudication 01/29/2014    Family history of early CAD 12/16/2013    Chronic lower back pain 12/16/2013    Vitamin D insufficiency 12/16/2013    Essential hypertension, benign 07/13/2012    Familial hyperlipidemia 07/13/2012       Current Outpatient Medications   Medication Sig Dispense Refill    Bempedoic Acid-Ezetimibe (NEXLIZET) 180-10 MG Tab TAKE ONE TABLET BY MOUTH ONE TIME DAILY 90 Tablet 3    amLODIPine (NORVASC) 5 MG Tab Take 1 Tablet by mouth every evening. 90 Tablet 3    carvedilol (COREG) 6.25 MG Tab Take 1 Tablet by mouth 2 times a day with meals. 180 Tablet 3    lisinopril (PRINIVIL) 40 MG tablet Take 1 Tablet by mouth every evening. 90 Tablet 3    alendronate (FOSAMAX) 70 MG Tab Take 1 Tablet by mouth every 7 days. 12 Tablet 3     CALCIUM PO Take  by mouth.      Evolocumab (REPATHA SURECLICK) 140 MG/ML Solution Auto-injector SubQ injection pen Inject 1 mL under the skin every 14 days. 6 Each 3    cephALEXin (KEFLEX) 250 MG Cap 1 PO post-coital PRN      estradiol (ESTRACE) 0.1 MG/GM vaginal cream       Vitamin D, Ergocalciferol, 50 MCG (2000 UT) Cap Vitamin D      aspirin EC (ECOTRIN) 81 MG Tablet Delayed Response Take 1 tablet by mouth every day. 90 tablet 3     No current facility-administered medications for this visit.          Current supplements as per medication list.     Allergies: Patient has no known allergies.    Current social contact/activities: Bible study, [employer], lunches with friends     She  reports that she has never smoked. She has never used smokeless tobacco. She reports current alcohol use of about 1.8 oz of alcohol per week. She reports that she does not use drugs.  Counseling given: Not Answered      ROS:    Gait: Uses no assistive device  Ostomy: No  Other tubes: No  Amputations: No  Chronic oxygen use: No  Last eye exam: 1 year ago  Wears hearing aids: Yes   : Reports urinary leakage during the last 6 months that has not interfered at all with their daily activities or sleep.    Screening:  Depression Screening  Little interest or pleasure in doing things?  0 - not at all  Feeling down, depressed , or hopeless? 0 - not at all  Patient Health Questionnaire Score: 0     If depressive symptoms identified deferred to follow up visit unless specifically addressed in assessment and plan.    Interpretation of PHQ-9 Total Score   Score Severity   1-4 No Depression   5-9 Mild Depression   10-14 Moderate Depression   15-19 Moderately Severe Depression   20-27 Severe Depression    Screening for Cognitive Impairment  Do you or any of your friends or family members have any concern about your memory? Yes  Three Minute Recall (Leader, Season, Table) 3/3    Duncan clock face with all 12 numbers and set the hands to show 10 minutes  after 11.  Yes 5/5  Cognitive concerns identified deferred for follow up unless specifically addressed in assessment and plan.    Fall Risk Assessment  Has the patient had two or more falls in the last year or any fall with injury in the last year?  No    Safety Assessment  Do you always wear your seatbelt?  Yes  Any changes to home needed to function safely? No  Difficulty hearing.  Yes  Patient counseled about all safety risks that were identified.    Functional Assessment ADLs  Are there any barriers preventing you from cooking for yourself or meeting nutritional needs?  No.    Are there any barriers preventing you from driving safely or obtaining transportation?  No.    Are there any barriers preventing you from using a telephone or calling for help?  No    Are there any barriers preventing you from shopping?  No.    Are there any barriers preventing you from taking care of your own finances?  No    Are there any barriers preventing you from managing your medications?  No    Are there any barriers preventing you from showering, bathing or dressing yourself? No    Are there any barriers preventing you from doing housework or laundry? No  Are there any barriers preventing you from using the toilet?No  Are you currently engaging in any exercise or physical activity?  Yes. Walks dogs 3-4 times a week    Self-Assessment of Health  What is your perception of your health? Good    Do you sleep more than six hours a night? Yes    In the past 7 days, how much did pain keep you from doing your normal work? None    Do you spend quality time with family or friends (virtually or in person)? Yes    Do you usually eat a heart healthy diet that constists of a variety of fruits, vegetables, whole grains and fiber? Yes    Do you eat foods high in fat and/or Fast Food more than three times per week? No    How concerned are you that your medical conditions are not being well managed? Not at all    Are you worried that in the next 2  months, you may not have stable housing that you own, rent, or stay in as part of a household? No      Advance Care Planning  Do you have an Advance Directive, Living Will, Durable Power of , or POLST? Yes  Advance Directive              Health Maintenance Summary            Ordered - Mammogram (Yearly) Ordered on 11/12/2024 03/06/2023  MA-SCREENING MAMMO BILAT W/TOMOSYNTHESIS W/CAD    01/04/2021  MA-SCREENING MAMMO BILAT W/TOMOSYNTHESIS W/CAD    06/19/2019  MA-SCREENING MAMMO BILAT W/TOMOSYNTHESIS W/CAD    12/15/2017  MA-SCREEN MAMMO W/CAD-BILAT    11/07/2016  MA-SCREEN MAMMO W/CAD-BILAT    Only the first 5 history entries have been loaded, but more history exists.              Overdue - COVID-19 Vaccine (4 - 2024-25 season) Overdue since 9/1/2024 11/04/2021  Imm Admin: MODERNA SARS-COV-2 VACCINE (12+)    03/30/2021  Imm Admin: MODERNA SARS-COV-2 VACCINE (12+)    03/02/2021  Imm Admin: MODERNA SARS-COV-2 VACCINE (12+)              IMM DTaP/Tdap/Td Vaccine (2 - Td or Tdap) Next due on 7/1/2025 07/01/2015  Imm Admin: Tdap Vaccine    05/11/2001  Imm Admin: TD Vaccine    10/06/1986  Imm Admin: TD Vaccine              Annual Wellness Visit (Yearly) Next due on 11/12/2025 11/12/2024  Visit Dx: Medicare annual wellness visit, subsequent    11/12/2024  Level of Service: [place] ANNUAL WELLNESS VISIT-INCLUDES PPPS SUBSEQUE*    09/07/2023  Prob Dx: Medicare annual wellness visit, subsequent    09/07/2023  Visit Dx: Medicare annual wellness visit, subsequent    07/07/2020  Initial Annual Wellness Visit - Includes PPPS ()    Only the first 5 history entries have been loaded, but more history exists.              Bone Density Scan (Every 5 Years) Next due on 9/28/2028 09/28/2023  DS-BONE DENSITY STUDY (DEXA)    08/08/2018  DS-BONE DENSITY STUDY (DEXA)              Colorectal Cancer Screening (Colonoscopy - Preferred) Next due on 7/16/2031 07/16/2021  REFERRAL TO GI FOR COLONOSCOPY     07/16/2021  REFERRAL TO GI FOR COLONOSCOPY    12/09/2013  AMB REFERRAL TO GI FOR COLONOSCOPY    07/02/2012  OCCULT BLOOD FECES IMMUNOASSAY              Pneumococcal Vaccine: 65+ Years (Series Information) Completed      07/07/2020  Imm Admin: Pneumococcal polysaccharide vaccine (PPSV-23)    07/06/2018  Imm Admin: Pneumococcal Conjugate Vaccine (Prevnar/PCV-13)    11/26/2002  Imm Admin: Pneumococcal polysaccharide vaccine (PPSV-23)              Hepatitis C Screening  Completed      06/25/2021  Hepatitis C Antibody component of HEP C VIRUS ANTIBODY    06/25/2021  Done              Zoster (Shingles) Vaccines (Series Information) Completed      05/09/2022  Imm Admin: Zoster Vaccine Recombinant (RZV) (SHINGRIX)    02/23/2022  Imm Admin: Zoster Vaccine Recombinant (RZV) (SHINGRIX)    09/24/2013  Imm Admin: Zoster Vaccine Live (ZVL) (Zostavax) - HISTORICAL DATA              Influenza Vaccine (Series Information) Completed      10/29/2024  Outside Immunization: Influenza Tri, Adj    10/19/2023  Imm Admin: Influenza Vaccine, Quadrivalent, Adjuvanted (Pf)    11/07/2022  Imm Admin: Influenza, Unspecified - HISTORICAL DATA    10/28/2021  Imm Admin: Influenza, Unspecified - HISTORICAL DATA    09/21/2020  Imm Admin: Influenza Vaccine Adult HD    Only the first 5 history entries have been loaded, but more history exists.              Hepatitis A Vaccine (Hep A) (Series Information) Aged Out      No completion history exists for this topic.              Hepatitis B Vaccine (Hep B) (Series Information) Aged Out      No completion history exists for this topic.              HPV Vaccines (Series Information) Aged Out      No completion history exists for this topic.              Meningococcal Immunization (Series Information) Aged Out      No completion history exists for this topic.              Discontinued - Polio Vaccine (Inactivated Polio)  Discontinued      10/06/1986  Imm Admin: OPV TRIVALENT - HISTORICAL DATA (GIVEN PRIOR TO  "MAY 2016)                    Patient Care Team:  Loren Salgado M.D. as PCP - General (Internal Medicine)        Social History     Tobacco Use    Smoking status: Never    Smokeless tobacco: Never   Vaping Use    Vaping status: Never Used   Substance Use Topics    Alcohol use: Yes     Alcohol/week: 1.8 oz     Types: 3 Cans of beer per week     Comment: 2-3 per week    Drug use: Never     Family History   Problem Relation Age of Onset    Heart Disease Father          from MI at age 32    Hyperlipidemia Father     GI Disease Mother         GERD    Hypertension Mother     Hyperlipidemia Daughter     Hyperlipidemia Grandchild      She  has a past medical history of Arthritis (), Benign hypertension (), Cold (), Hypercholesteremia, and Pain ().   Past Surgical History:   Procedure Laterality Date    FUSION, SPINE, LUMBAR, PLIF  2014    Performed by Maci Babb M.D. at SURGERY [facility]    LUMBAR LAMINECTOMY DISKECTOMY  2014    Performed by Maci Babb M.D. at SURGERY [facility]    LUMBAR DECOMPRESSION  2014    Performed by Maci Babb M.D. at SURGERY [facility]    HYSTERECTOMY, TOTAL ABDOMINAL      TONSILLECTOMY         Exam:   /78 (BP Location: Left arm, Patient Position: Sitting, BP Cuff Size: Adult)   Pulse 62   Temp 36.8 °C (98.2 °F) (Temporal)   Resp 16   Ht 1.626 m (5' 4\")   Wt 81.2 kg (179 lb)   SpO2 94%  Body mass index is 30.73 kg/m².    Hearing good.  wearing hearing aids  Dentition good  Alert, oriented in no acute distress.  Eye contact is good, speech goal directed, affect calm  Skin lesion on her right calf    Assessment and Plan. The following treatment and monitoring plan is recommended:    1. Medicare annual wellness visit, subsequent    2. Essential hypertension, benign  - CBC WITH DIFFERENTIAL; Future  - TSH WITH REFLEX TO FT4; Future  - URINALYSIS,CULTURE IF INDICATED; Future  - amLODIPine (NORVASC) 5 MG Tab; Take 1 " Tablet by mouth every evening.  Dispense: 90 Tablet; Refill: 3  - carvedilol (COREG) 6.25 MG Tab; Take 1 Tablet by mouth 2 times a day with meals.  Dispense: 180 Tablet; Refill: 3  - lisinopril (PRINIVIL) 40 MG tablet; Take 1 Tablet by mouth every evening.  Dispense: 90 Tablet; Refill: 3    3. Vitamin D insufficiency  Recommend vitamin D3 0915-6440 international units daily    4. Impaired fasting glucose  - HEMOGLOBIN A1C; Future  Healthful lifestyle measures    5. Obesity (BMI 30-39.9)  Healthful lifestyle measures    6. Baker's cyst of knee, right  She will go to see orthopedics at [facility]    8. Bilateral leg pain  Intermittent leg cramps/pain, right more than left  Has Baker's cyst on right side  - US-EXTREMITY VENOUS LOWER BILAT; Future    10. Osteopenia after menopause  - alendronate (FOSAMAX) 70 MG Tab; Take 1 Tablet by mouth every 7 days.  Dispense: 12 Tablet; Refill: 3  Benefits, risks, and adverse reactions of medication discussed. Patient is agreeable with initiating the medication.  Calcium: 1200 mg daily from all sources, along with a vitamin D supplement and regular weight-bearing exercise.    We reviewed a list of calcium-rich foods from [facility].    She will see her dermatologist for her skin lesion.    Services suggested: No services needed at this time  Health Care Screening: Age-appropriate preventive services recommended by USPTF and ACIP covered by Medicare were discussed today. Services ordered if indicated and agreed upon by the patient.  Referrals offered: Community-based lifestyle interventions to reduce health risks and promote self-management and wellness, fall prevention, nutrition, physical activity, tobacco-use cessation, weight loss, and mental health services as per orders if indicated.    Discussion today about general wellness and lifestyle habits:    Prevent falls and reduce trip hazards; Cautioned about securing or removing rugs.  Have a working fire alarm and carbon  monoxide detector;   Engage in regular physical activity and social activities     Follow-up: Return in about 3 months (around 2/12/2025), or if symptoms worsen or fail to improve.

## 2024-11-13 ENCOUNTER — HOSPITAL ENCOUNTER (OUTPATIENT)
Dept: RADIOLOGY | Facility: MEDICAL CENTER | Age: 69
End: 2024-11-13
Attending: INTERNAL MEDICINE
Payer: MEDICARE

## 2024-11-13 DIAGNOSIS — M79.604 BILATERAL LEG PAIN: ICD-10-CM

## 2024-11-13 DIAGNOSIS — M79.605 BILATERAL LEG PAIN: ICD-10-CM

## 2024-11-13 PROCEDURE — 93970 EXTREMITY STUDY: CPT

## 2024-11-26 ENCOUNTER — HOSPITAL ENCOUNTER (OUTPATIENT)
Dept: RADIOLOGY | Facility: MEDICAL CENTER | Age: 69
End: 2024-11-26
Attending: INTERNAL MEDICINE
Payer: MEDICARE

## 2024-11-26 ENCOUNTER — APPOINTMENT (RX ONLY)
Dept: URBAN - METROPOLITAN AREA CLINIC 15 | Facility: CLINIC | Age: 69
Setting detail: DERMATOLOGY
End: 2024-11-26

## 2024-11-26 DIAGNOSIS — Z12.31 ENCOUNTER FOR SCREENING MAMMOGRAM FOR BREAST CANCER: ICD-10-CM

## 2024-11-26 DIAGNOSIS — Z71.89 OTHER SPECIFIED COUNSELING: ICD-10-CM

## 2024-11-26 DIAGNOSIS — L82.0 INFLAMED SEBORRHEIC KERATOSIS: ICD-10-CM

## 2024-11-26 PROBLEM — D48.5 NEOPLASM OF UNCERTAIN BEHAVIOR OF SKIN: Status: ACTIVE | Noted: 2024-11-26

## 2024-11-26 PROCEDURE — ? COUNSELING

## 2024-11-26 PROCEDURE — ? BIOPSY BY SHAVE METHOD

## 2024-11-26 PROCEDURE — ? SUNSCREEN RECOMMENDATIONS

## 2024-11-26 PROCEDURE — 77067 SCR MAMMO BI INCL CAD: CPT

## 2024-11-26 PROCEDURE — 11102 TANGNTL BX SKIN SINGLE LES: CPT

## 2024-11-26 PROCEDURE — 99212 OFFICE O/P EST SF 10 MIN: CPT | Mod: 25

## 2024-11-26 ASSESSMENT — LOCATION ZONE DERM: LOCATION ZONE: LEG

## 2024-11-26 ASSESSMENT — LOCATION SIMPLE DESCRIPTION DERM: LOCATION SIMPLE: RIGHT LOWER LEG

## 2024-11-26 ASSESSMENT — LOCATION DETAILED DESCRIPTION DERM: LOCATION DETAILED: RIGHT LATERAL PROXIMAL CALF

## 2024-11-26 NOTE — PROCEDURE: BIOPSY BY SHAVE METHOD
Detail Level: Detailed
Depth Of Biopsy: dermis
Was A Bandage Applied: Yes
Size Of Lesion In Cm: 0.3
X Size Of Lesion In Cm: 0
Biopsy Type: H and E
Biopsy Method: Dermablade
Anesthesia Type: 0.5% lidocaine without epinephrine
Hemostasis: Electrocautery
Wound Care: Petrolatum
Dressing: bandage
Destruction After The Procedure: No
Type Of Destruction Used: Curettage
Curettage Text: The wound bed was treated with curettage after the biopsy was performed.
Cryotherapy Text: The wound bed was treated with cryotherapy after the biopsy was performed.
Electrodesiccation Text: The wound bed was treated with electrodesiccation after the biopsy was performed.
Electrodesiccation And Curettage Text: The wound bed was treated with electrodesiccation and curettage after the biopsy was performed.
Silver Nitrate Text: The wound bed was treated with silver nitrate after the biopsy was performed.
Lab: [id number]
Lab Facility: 
Consent: Written consent was obtained and risks were reviewed including but not limited to scarring, infection, bleeding, scabbing, incomplete removal, nerve damage and allergy to anesthesia.
Post-Care Instructions: I reviewed with the patient in detail post-care instructions. Patient is to keep the biopsy site dry overnight, and then apply bacitracin twice daily until healed. Patient may apply hydrogen peroxide soaks to remove any crusting.
Notification Instructions: Patient will be notified of biopsy results. However, patient instructed to call the office if not contacted within 2 weeks.
Billing Type: Third-Party Bill
Information: Selecting Yes will display possible errors in your note based on the variables you have selected. This validation is only offered as a suggestion for you. PLEASE NOTE THAT THE VALIDATION TEXT WILL BE REMOVED WHEN YOU FINALIZE YOUR NOTE. IF YOU WANT TO FAX A PRELIMINARY NOTE YOU WILL NEED TO TOGGLE THIS TO 'NO' IF YOU DO NOT WANT IT IN YOUR FAXED NOTE.

## 2024-12-19 ENCOUNTER — TELEPHONE (OUTPATIENT)
Dept: PHARMACY | Facility: MEDICAL CENTER | Age: 69
End: 2024-12-19
Payer: MEDICARE

## 2024-12-19 NOTE — TELEPHONE ENCOUNTER
"Prior Authorization for Nexlizet Tablet 180-10 MG  has been approved for a quantity of 90 , day supply 90    Prior Authorization reference number: [id number]/A  Insurance: [employer]  Effective dates: 10/01/2024 - 12/31/2025  Copay: Unsure     Is patient eligible to fill with [facility]? No, test claim rejected stating Unable to TC as we are NOT contracted.    Next Steps:  Upon CMM PA submittal returned \"Authorization already on file for this request. Authorization starting on 10/01/2024 and ending on 12/31/2025.\" patient must fill at [facility], will be able to obtain at next refill opportunity.   "

## 2025-01-03 ENCOUNTER — OFFICE VISIT (OUTPATIENT)
Dept: URGENT CARE | Facility: CLINIC | Age: 70
End: 2025-01-03
Payer: MEDICARE

## 2025-01-03 VITALS
OXYGEN SATURATION: 95 % | BODY MASS INDEX: 29.53 KG/M2 | WEIGHT: 173 LBS | TEMPERATURE: 98.1 F | HEIGHT: 64 IN | DIASTOLIC BLOOD PRESSURE: 62 MMHG | RESPIRATION RATE: 18 BRPM | HEART RATE: 74 BPM | SYSTOLIC BLOOD PRESSURE: 108 MMHG

## 2025-01-03 DIAGNOSIS — R05.1 ACUTE COUGH: ICD-10-CM

## 2025-01-03 DIAGNOSIS — H66.002 NON-RECURRENT ACUTE SUPPURATIVE OTITIS MEDIA OF LEFT EAR WITHOUT SPONTANEOUS RUPTURE OF TYMPANIC MEMBRANE: ICD-10-CM

## 2025-01-03 PROCEDURE — 3078F DIAST BP <80 MM HG: CPT | Performed by: REGISTERED NURSE

## 2025-01-03 PROCEDURE — 3074F SYST BP LT 130 MM HG: CPT | Performed by: REGISTERED NURSE

## 2025-01-03 PROCEDURE — 99214 OFFICE O/P EST MOD 30 MIN: CPT | Performed by: REGISTERED NURSE

## 2025-01-03 RX ORDER — DEXTROMETHORPHAN HYDROBROMIDE AND PROMETHAZINE HYDROCHLORIDE 15; 6.25 MG/5ML; MG/5ML
5 SYRUP ORAL EVERY 4 HOURS PRN
Qty: 120 ML | Refills: 0 | Status: SHIPPED | OUTPATIENT
Start: 2025-01-03

## 2025-01-03 ASSESSMENT — FIBROSIS 4 INDEX: FIB4 SCORE: 1.38

## 2025-01-03 ASSESSMENT — ENCOUNTER SYMPTOMS
DIZZINESS: 0
CHILLS: 1
SHORTNESS OF BREATH: 0
SORE THROAT: 1
COUGH: 1
FEVER: 0
ABDOMINAL PAIN: 0
MYALGIAS: 1

## 2025-01-03 NOTE — PROGRESS NOTES
Subjective:   Joanie Arenas is a 69 y.o. female who presents for Cough (X 6 days, continuous, ears tenderness, eyes achy, chest congestion, worse at nights, started with a sore throat and congestion, night sweats)      HPI  X 6 days of cold symptoms, cough is worsening, also ear pain. Hx of ear infections with spontaneous TM rupture. No chronic lung issues. Exposed to son-in-law who was sick. Using OTC medications with minimal relief. Tolerating PO.     Review of Systems   Constitutional:  Positive for chills. Negative for fever.   HENT:  Positive for congestion and sore throat.    Respiratory:  Positive for cough. Negative for shortness of breath.    Cardiovascular:  Negative for chest pain.   Gastrointestinal:  Negative for abdominal pain.   Musculoskeletal:  Positive for myalgias.   Skin:  Negative for rash.   Neurological:  Negative for dizziness.       No Known Allergies    Patient Active Problem List    Diagnosis Date Noted    Baker's cyst of knee, right 11/12/2024    Deviated nasal septum 11/10/2023    Snorings 11/10/2023    Medicare annual wellness visit, subsequent 09/07/2023    Osteopenia of multiple sites 09/07/2023    Preventative health care 06/15/2022    Heartburn 11/29/2021    Impaired fasting glucose 09/13/2021    Coronary artery calcification seen on computed tomography 07/02/2021    Numbness and tingling of right arm 06/10/2021    Fecal smearing 06/10/2021    Obesity (BMI 30-39.9) 06/10/2021    Gastroesophageal reflux disease without esophagitis 06/10/2021    History of urinary tract infection 09/26/2019    Atrophic vaginitis 09/26/2019    Chronic right shoulder pain 05/10/2017    Digital mucous cyst of toe of right foot 05/10/2017    Elevated CPK 05/11/2016    Bilateral hearing loss 12/29/2015    Pain with urination 12/01/2015    Muscle cramps 07/01/2015    Primary osteoarthritis of both knees 07/01/2015    Left foot pain 06/18/2014    Spinal stenosis, lumbar region, without neurogenic  claudication 01/29/2014    Family history of early CAD 12/16/2013    Chronic lower back pain 12/16/2013    Vitamin D insufficiency 12/16/2013    Essential hypertension, benign 07/13/2012    Familial hyperlipidemia 07/13/2012       Current Outpatient Medications Ordered in Epic   Medication Sig Dispense Refill    asa/apap/caffeine (EXCEDRIN) 250-250-65 MG Tab Take 1 Tablet by mouth every 6 hours as needed for Headache.      amoxicillin-clavulanate (AUGMENTIN) 875-125 MG Tab Take 1 Tablet by mouth 2 times a day for 7 days. 14 Tablet 0    promethazine-dextromethorphan (PROMETHAZINE-DM) 6.25-15 MG/5ML syrup Take 5 mL by mouth every four hours as needed for Cough. 120 mL 0    NEXLIZET 180-10 MG Tab TAKE ONE TABLET BY MOUTH ONE TIME DAILY 90 Tablet 3    amLODIPine (NORVASC) 5 MG Tab Take 1 Tablet by mouth every evening. 90 Tablet 3    carvedilol (COREG) 6.25 MG Tab Take 1 Tablet by mouth 2 times a day with meals. 180 Tablet 3    lisinopril (PRINIVIL) 40 MG tablet Take 1 Tablet by mouth every evening. 90 Tablet 3    alendronate (FOSAMAX) 70 MG Tab Take 1 Tablet by mouth every 7 days. 12 Tablet 3    CALCIUM PO Take  by mouth.      Evolocumab (REPATHA SURECLICK) 140 MG/ML Solution Auto-injector SubQ injection pen Inject 1 mL under the skin every 14 days. 6 Each 3    estradiol (ESTRACE) 0.1 MG/GM vaginal cream       Vitamin D, Ergocalciferol, 50 MCG (2000 UT) Cap Vitamin D      aspirin EC (ECOTRIN) 81 MG Tablet Delayed Response Take 1 tablet by mouth every day. 90 tablet 3     No current Epic-ordered facility-administered medications on file.       Past Surgical History:   Procedure Laterality Date    FUSION, SPINE, LUMBAR, PLIF  1/29/2014    Performed by Mcai Babb M.D. at SURGERY [facility]    LUMBAR LAMINECTOMY DISKECTOMY  1/29/2014    Performed by Maci Babb M.D. at SURGERY [facility]    LUMBAR DECOMPRESSION  1/29/2014    Performed by Maci Babb M.D. at SURGERY [facility]    HYSTERECTOMY,  "TOTAL ABDOMINAL      TONSILLECTOMY         Social History     Tobacco Use    Smoking status: Never    Smokeless tobacco: Never   Vaping Use    Vaping status: Never Used   Substance Use Topics    Alcohol use: Yes     Alcohol/week: 1.8 oz     Types: 3 Cans of beer per week     Comment: occ    Drug use: Never       family history includes GI Disease in her mother; Heart Disease in her father; Hyperlipidemia in her daughter, father, and grandchild; Hypertension in her mother.     Problem list, medications, and allergies reviewed by myself today in Epic.     Objective:   /62 (BP Location: Left arm, Patient Position: Sitting, BP Cuff Size: Adult)   Pulse 74   Temp 36.7 °C (98.1 °F) (Temporal)   Resp 18   Ht 1.626 m (5' 4\")   Wt 78.5 kg (173 lb)   SpO2 95%   BMI 29.70 kg/m²     Physical Exam  Vitals and nursing note reviewed.   Constitutional:       General: She is not in acute distress.     Appearance: Normal appearance. She is well-developed. She is not ill-appearing, toxic-appearing or diaphoretic.   HENT:      Head: Normocephalic and atraumatic.      Right Ear: Tympanic membrane, ear canal and external ear normal.      Left Ear: Ear canal and external ear normal.      Ears:      Comments: Left TM erythemic with mucopurulent effusion, no bulging     Nose: Congestion and rhinorrhea present.      Mouth/Throat:      Mouth: Mucous membranes are moist.      Pharynx: No oropharyngeal exudate or posterior oropharyngeal erythema.   Eyes:      General:         Right eye: No discharge.         Left eye: No discharge.      Conjunctiva/sclera: Conjunctivae normal.   Cardiovascular:      Rate and Rhythm: Normal rate and regular rhythm.      Pulses: Normal pulses.      Heart sounds: Normal heart sounds.   Pulmonary:      Effort: Pulmonary effort is normal. No respiratory distress.      Breath sounds: Normal breath sounds. No wheezing, rhonchi or rales.      Comments: Harsh congested cough  Musculoskeletal:      Cervical " back: Normal range of motion and neck supple.      Right lower leg: No edema.      Left lower leg: No edema.   Lymphadenopathy:      Cervical: No cervical adenopathy.   Skin:     General: Skin is warm and dry.   Neurological:      General: No focal deficit present.      Mental Status: She is alert and oriented to person, place, and time. Mental status is at baseline.   Psychiatric:         Mood and Affect: Mood normal.         Behavior: Behavior normal.         Thought Content: Thought content normal.         Judgment: Judgment normal.         Assessment/Plan:     I personally reviewed prior external notes and test results pertinent to today's visit as well as additional imaging and testing completed in clinic today.    I introduced myself as Leonardo Lopez a Nurse Practitioner.    1. Non-recurrent acute suppurative otitis media of left ear without spontaneous rupture of tympanic membrane  amoxicillin-clavulanate (AUGMENTIN) 875-125 MG Tab      2. Acute cough  promethazine-dextromethorphan (PROMETHAZINE-DM) 6.25-15 MG/5ML syrup        TESTING: none  Vital Signs: WNL  PLAN/MDM: No high fevers, no neck stiffness, no loss of hearing, no ear drainage, no rash. Non-toxic appearance, did note evidence of left middle ear infection, right ear findings normal.  Normal throat findings.  No adventitious lung sounds indicate secondary bacterial lung infection.  Given her history of spontaneous TM rupture with ear infections will treat with Augmentin, ER precautions given.  Additionally cough is persistent and keeping her awake at night so we will add Promethazine DM precautions given due to her age as well as sedative nature of medicine.  We discussed likely viral illness that led to acute otitis media.    Will start on Augmentin for ASOM  Promethazine DM for cough  OTC analgesics   OTC nonsedating allergy medication  Warm steam shower  Adequate hydration  Reviewed return precautions and ER indications  . Medication discussed  included indication for use and the potential benefits and side effects. The Patient was encouraged to monitor symptoms closely, and we reviewed the signs and symptoms that require a higher level of care through the emergency department. Patient verbalized understanding.    Please note that this dictation was created using voice recognition software. I have made every reasonable attempt to correct obvious errors, but I expect that there are errors of grammar and possibly content that I did not discover before finalizing the note.    This note was electronically signed by MILO Pastor

## 2025-01-27 ENCOUNTER — TELEPHONE (OUTPATIENT)
Dept: VASCULAR LAB | Facility: MEDICAL CENTER | Age: 70
End: 2025-01-27
Payer: MEDICARE

## 2025-01-27 NOTE — TELEPHONE ENCOUNTER
PA forms received from [facility].     Faxed and submitted to [facility] PA department @ [phone].     Will follow up within 24-48 business hours @ [phone].     ANEL Parker, PhT  Vascular Pharmacy Liaison (Rx Coordinator)  P: [phone]  1/27/2025 3:05 PM

## 2025-01-27 NOTE — TELEPHONE ENCOUNTER
Prior Authorization for NEXLIZET 180-10 MG TABLETS  (Quantity: 90, Days: 90) has been submitted via Cover My Meds: Key ([id number])    Insurance: [facility]    Will follow up in 24-48 business hours.     ANEL Parker, PhT  Vascular Pharmacy Liaison (Rx Coordinator)  P: [phone]  1/27/2025 10:20 AM

## 2025-01-30 ENCOUNTER — NON-PROVIDER VISIT (OUTPATIENT)
Dept: VASCULAR LAB | Facility: MEDICAL CENTER | Age: 70
End: 2025-01-30
Attending: INTERNAL MEDICINE
Payer: MEDICARE

## 2025-01-30 DIAGNOSIS — E78.49 FAMILIAL HYPERLIPIDEMIA: Primary | ICD-10-CM

## 2025-01-30 LAB
ALBUMIN SERPL-MCNC: 4.8 G/DL (ref 3.8–4.8)
ALP SERPL-CCNC: 76 IU/L (ref 44–121)
ALT SERPL-CCNC: 16 IU/L (ref 0–32)
APPEARANCE UR: CLEAR
AST SERPL-CCNC: 24 IU/L (ref 0–40)
BACTERIA #/AREA URNS HPF: NORMAL /[HPF]
BACTERIA UR CULT: NO GROWTH
BACTERIA UR CULT: NORMAL
BASOPHILS # BLD AUTO: 0.1 X10E3/UL (ref 0–0.2)
BASOPHILS NFR BLD AUTO: 1 %
BILIRUB SERPL-MCNC: 0.6 MG/DL (ref 0–1.2)
BILIRUB UR QL STRIP: NEGATIVE
BUN SERPL-MCNC: 13 MG/DL (ref 8–27)
BUN/CREAT SERPL: 20 (ref 12–28)
CALCIUM SERPL-MCNC: 9.4 MG/DL (ref 8.7–10.3)
CASTS URNS MICRO: NORMAL
CASTS URNS QL MICRO: NORMAL /LPF
CHLORIDE SERPL-SCNC: 103 MMOL/L (ref 96–106)
CHOLEST SERPL-MCNC: 156 MG/DL (ref 100–199)
CO2 SERPL-SCNC: 23 MMOL/L (ref 20–29)
COLOR UR: YELLOW
CREAT SERPL-MCNC: 0.66 MG/DL (ref 0.57–1)
CRYSTALS URNS MICRO: NORMAL
EGFRCR SERPLBLD CKD-EPI 2021: 94 ML/MIN/1.73
EOSINOPHIL # BLD AUTO: 0.1 X10E3/UL (ref 0–0.4)
EOSINOPHIL NFR BLD AUTO: 1 %
EPI CELLS #/AREA URNS HPF: NORMAL /HPF (ref 0–10)
ERYTHROCYTE [DISTWIDTH] IN BLOOD BY AUTOMATED COUNT: 12.8 % (ref 11.7–15.4)
GLOBULIN SER CALC-MCNC: 2.2 G/DL (ref 1.5–4.5)
GLUCOSE SERPL-MCNC: 87 MG/DL (ref 70–99)
GLUCOSE UR QL STRIP: NEGATIVE
HBA1C MFR BLD: 5.6 % (ref 4.8–5.6)
HCT VFR BLD AUTO: 45.1 % (ref 34–46.6)
HDLC SERPL-MCNC: 44 MG/DL
HGB BLD-MCNC: 14.9 G/DL (ref 11.1–15.9)
HGB UR QL STRIP: NEGATIVE
IMM GRANULOCYTES # BLD AUTO: 0 X10E3/UL (ref 0–0.1)
IMM GRANULOCYTES NFR BLD AUTO: 1 %
IMMATURE CELLS  115398: NORMAL
KETONES UR QL STRIP: NEGATIVE
LDL CALC COMMENT:: ABNORMAL
LDLC SERPL CALC-MCNC: 80 MG/DL (ref 0–99)
LEUKOCYTE ESTERASE UR QL STRIP: ABNORMAL
LYMPHOCYTES # BLD AUTO: 1.7 X10E3/UL (ref 0.7–3.1)
LYMPHOCYTES NFR BLD AUTO: 29 %
MCH RBC QN AUTO: 29.6 PG (ref 26.6–33)
MCHC RBC AUTO-ENTMCNC: 33 G/DL (ref 31.5–35.7)
MCV RBC AUTO: 90 FL (ref 79–97)
MICRO URNS: ABNORMAL
MICRO URNS: ABNORMAL
MONOCYTES # BLD AUTO: 0.6 X10E3/UL (ref 0.1–0.9)
MONOCYTES NFR BLD AUTO: 10 %
MORPHOLOGY BLD-IMP: NORMAL
MUCOUS THREADS URNS QL MICRO: NORMAL
NEUTROPHILS # BLD AUTO: 3.4 X10E3/UL (ref 1.4–7)
NEUTROPHILS NFR BLD AUTO: 58 %
NITRITE UR QL STRIP: NEGATIVE
NRBC BLD AUTO-RTO: NORMAL %
PH UR STRIP: 7.5 [PH] (ref 5–7.5)
PLATELET # BLD AUTO: 362 X10E3/UL (ref 150–450)
POTASSIUM SERPL-SCNC: 4.4 MMOL/L (ref 3.5–5.2)
PROT SERPL-MCNC: 7 G/DL (ref 6–8.5)
PROT UR QL STRIP: NEGATIVE
RBC # BLD AUTO: 5.03 X10E6/UL (ref 3.77–5.28)
RBC #/AREA URNS HPF: NORMAL /HPF (ref 0–2)
RENAL EPI CELLS #/AREA URNS HPF: NORMAL /[HPF]
SODIUM SERPL-SCNC: 141 MMOL/L (ref 134–144)
SP GR UR STRIP: 1.01 (ref 1–1.03)
T VAGINALIS URNS QL MICRO: NORMAL
TRIGL SERPL-MCNC: 186 MG/DL (ref 0–149)
TSH SERPL DL<=0.005 MIU/L-ACNC: 2.1 UIU/ML (ref 0.45–4.5)
UNIDENT CRYS URNS QL MICRO: NORMAL
URINALYSIS REFLEX  377202: ABNORMAL
URNS CMNT MICRO: NORMAL
UROBILINOGEN UR STRIP-MCNC: 0.2 MG/DL (ref 0.2–1)
VLDLC SERPL CALC-MCNC: 32 MG/DL (ref 5–40)
WBC # BLD AUTO: 5.9 X10E3/UL (ref 3.4–10.8)
WBC #/AREA URNS HPF: NORMAL /HPF (ref 0–5)
YEAST #/AREA URNS HPF: NORMAL /[HPF]

## 2025-01-30 PROCEDURE — 99212 OFFICE O/P EST SF 10 MIN: CPT

## 2025-01-30 NOTE — PROGRESS NOTES
[facility] - FollowUp Visit  Date of Service: 01/30/25    Joanie Arenas is here for follow up of dyslipidemia.    Subjective    HPI  Pertinent Interval History since last visit:   None  Current Prescription Lipid Lowering Medications - including dose:   Statin: None  Non-Statin: Repatha 140 q14 days, Nexlizet 180/10mg QD  Current Lipid Lowering and Related Supplements:   Vitamin D  Any Current Side Effects Potentially Related to Lipid Lowering therapy?   No  Current Adherence to Lipid Lowering Therapies:  Full compliance  Previously Attempted Interventions for Lipids - including outcome  Statin: Rosuvastatin, Atorvastatin, Pravastatin- she thinks she has tried them all since she has been on statin for at least 30+ years              Outcome:  having leg cramps, and taken off statins, but leg cramps persisted. Not true intolerance? Unsure, but CK values elevated so she remained off of statin  Non-Statin: None              Outcome: N/A  Any Previous History of Statin Intolerance?   Yes, Details: patient has been on statins for 30+ years, but   Baseline Lipids Prior to Treatment on Statin alone     Latest Reference Range & Units 06/20/18 10:19   Cholesterol,Tot 100 - 199 mg/dL 274 (H)   Triglycerides 0 - 149 mg/dL 133   HDL >=40 mg/dL 52   LDL <100 mg/dL 195 (H)   However, the patient has been on Repatha 140mg Q14d and Ezetimibe 10mg QD since Sept 2021 and those labs are below:     Latest Reference Range & Units 09/08/21 08:04   Cholesterol,Tot 100 - 199 mg/dL 238 (H)   Triglycerides 0 - 149 mg/dL 214 (H)   HDL >=40 mg/dL 47   LDL <100 mg/dL 148 (H)   (H): Data is abnormally high     Other Pertinent History & CV risk factors: Patient has elevated CAC score   Coronary calcification:  (previous CAC score 27 in 2017)  LMA - 26.3  LCX - 17.9  LAD - 96.0  RCA - 82.4  PDA - 0.0     Total Calcium Score: 222.7      SOCIAL HISTORY  Social History     Tobacco Use   Smoking Status Never   Smokeless Tobacco Never       Change in weight: stable  Exercise habits: walks 1-2 miles daily  Diet: Dash Diet      Objective    There were no vitals filed for this visit.   Physical Exam    DATA REVIEW  Most Recent Lipid Panel:   Lab Results   Component Value Date    CHOLSTRLTOT 149 07/12/2024    TRIGLYCERIDE 128 07/12/2024    HDL 48 07/12/2024    LDL 75 07/12/2024       Other Pertinent Blood Work:   Lab Results   Component Value Date    SODIUM 143 07/12/2024    POTASSIUM 4.5 07/12/2024    CHLORIDE 106 07/12/2024    CO2 27 07/12/2024    ANION 10.0 07/12/2024    GLUCOSE 95 07/12/2024    BUN 17 07/12/2024    CREATININE 0.72 07/12/2024    CALCIUM 9.4 07/12/2024    ASTSGOT 22 07/12/2024    ALTSGPT 16 07/12/2024    ALKPHOSPHAT 77 07/12/2024    TBILIRUBIN 0.5 07/12/2024    ALBUMIN 4.7 07/12/2024    AGRATIO 1.7 07/12/2024    TSHULTRASEN 2.430 01/12/2024    CPKTOTAL 150 12/08/2023             ASSESSMENT AND PLAN  Patient Type, check all that apply:   Primary Prevention  Established Atherosclerotic Cardiovascular Disease (ASCVD)  Yes, Details: pt has elevated CAC score of 222.7  and HTN  Other Established (non-atherosclerotic) Vascular Disease, if Present:  None  Evidence of Heterozygous Familial Hypercholesterolemia (FH):   Yes  ACC/AHA Indication for Statin Therapy, alma all that apply:  Established ASCVD: Indication for High intensity statin   Calculated Risk for ASCVD, if applicable    N/A  Other Significant Risk Markers, if any, alma all that apply   Elevated coronary calcium score and Family history of premature ASCVD in first degree relative  Goal LDL-C and nonHDL-C based on Clinic Protocol  LDL-C <100 or preferably <70 given significant risk markers  Lifestyle Recommendations From Today’s Visit:    Eating Plan: Concentrate on  Low sat/Trans fat, Low simple carb, and DASH/Med Style diet and Exercise: continue walking and increase time and intensity as tolerated  Statin Therapy Recommendations from Today’s Visit:   None  Non-Statin Medications  Recommendations from Today’s Visit:   Continue Nexlizet 180/10mg QD  Indication for PCSK9 Inhibitor, if applicable:  FH with suboptimal control of LDL-C despite maximally tolerated statin - Continue with Repatha 140 q14 days  Supplements Recommended at this visit:   Continue with Vit D 2000 units     Pt seen today for follow-up. She went to the lab on Mon to complete her lipid panel, but results are not available yet. Will f/u with [facility] then f/u with pt thereafter by phone.    Studies Ordered at Todays Visit:  None   Blood Work Ordered At Today’s visit:   None, but will plan to order repeat lipid panel and CMP for next visit  Follow-Up:   6 months    [place] Frances, ShanaeD    CC:  Tsung-Hsien Justin Lin, M.D. Michael Bloch, MD

## 2025-01-30 NOTE — TELEPHONE ENCOUNTER
PA submitted for NEXLIZET 180-10MG TABLETS has been approved for a quantity of 90 , day supply 90    PA reference number: [id number]  Insurance: [employer] [facility]   Effective dates: 01/27/2025-07/27/2025  Copay: $141     Is patient eligible to fill with [facility]? Yes    Next Steps:  called [employer] @ [phone] and s/w Anjana Pérez to obtain copay and  status. Per Beto pt just picked up today for $141 copay for a 3 month supply.     ANEL Parker, PhT  Vascular Pharmacy Liaison (Rx Coordinator)  P: [phone]  1/30/2025 3:53 PM

## 2025-02-05 ENCOUNTER — TELEPHONE (OUTPATIENT)
Dept: CARDIOLOGY | Facility: MEDICAL CENTER | Age: 70
End: 2025-02-05
Payer: MEDICARE

## 2025-02-05 NOTE — TELEPHONE ENCOUNTER
Prior Authorization for Repatha 140MG/ML syringes (Quantity: 6, Days: 84) has been submitted via Cover My Meds: Key ([id number])      Will follow up in 24-48 business hours.

## 2025-02-05 NOTE — TELEPHONE ENCOUNTER
Prior Authorization for Repatha 140MG/ML syringes has been approved for a quantity of 6 , day supply 84    Prior Authorization reference number:  [id number]  Effective dates: 2/4/25 until 8/04/25  Copay: $47     Is patient eligible to fill with [facility]? Yes    Next Steps:  PA request sent from pt preferred pharmacy [facility]. Will call pharmacy PA has been done.

## 2025-02-05 NOTE — TELEPHONE ENCOUNTER
Received Renewal PA request via  FAX   for Repatha 140MG/ML syringes. (Quantity:6, Day Supply:84)     Insurance: [facility]  Member ID:  [id number]  BIN: [id number]  PCN: [id number]  Group: [id number]     Ran Test claim via [place] & medication Rejects stating prior authorization is required.

## 2025-02-13 ENCOUNTER — APPOINTMENT (OUTPATIENT)
Dept: MEDICAL GROUP | Facility: IMAGING CENTER | Age: 70
End: 2025-02-13
Payer: MEDICARE

## 2025-02-13 VITALS
TEMPERATURE: 98.5 F | DIASTOLIC BLOOD PRESSURE: 70 MMHG | HEART RATE: 67 BPM | BODY MASS INDEX: 29.64 KG/M2 | RESPIRATION RATE: 16 BRPM | OXYGEN SATURATION: 95 % | WEIGHT: 173.6 LBS | SYSTOLIC BLOOD PRESSURE: 134 MMHG | HEIGHT: 64 IN

## 2025-02-13 DIAGNOSIS — J30.2 SEASONAL ALLERGIC RHINITIS, UNSPECIFIED TRIGGER: ICD-10-CM

## 2025-02-13 DIAGNOSIS — I10 ESSENTIAL HYPERTENSION, BENIGN: ICD-10-CM

## 2025-02-13 DIAGNOSIS — R73.01 IMPAIRED FASTING GLUCOSE: ICD-10-CM

## 2025-02-13 DIAGNOSIS — R09.82 POSTNASAL DRIP: ICD-10-CM

## 2025-02-13 PROCEDURE — 3078F DIAST BP <80 MM HG: CPT | Performed by: INTERNAL MEDICINE

## 2025-02-13 PROCEDURE — 99214 OFFICE O/P EST MOD 30 MIN: CPT | Performed by: INTERNAL MEDICINE

## 2025-02-13 PROCEDURE — 1126F AMNT PAIN NOTED NONE PRSNT: CPT | Performed by: INTERNAL MEDICINE

## 2025-02-13 PROCEDURE — 3075F SYST BP GE 130 - 139MM HG: CPT | Performed by: INTERNAL MEDICINE

## 2025-02-13 RX ORDER — AZELASTINE 1 MG/ML
1 SPRAY, METERED NASAL 2 TIMES DAILY
Qty: 30 ML | Refills: 3 | Status: SHIPPED | OUTPATIENT
Start: 2025-02-13

## 2025-02-13 ASSESSMENT — PATIENT HEALTH QUESTIONNAIRE - PHQ9: CLINICAL INTERPRETATION OF PHQ2 SCORE: 0

## 2025-02-13 ASSESSMENT — PAIN SCALES - GENERAL: PAINLEVEL_OUTOF10: NO PAIN

## 2025-02-13 ASSESSMENT — FIBROSIS 4 INDEX: FIB4 SCORE: 1.19

## 2025-02-13 NOTE — PROGRESS NOTES
Established Patient    Joanie Arenas is a 70 y.o. female who presents today with the following:    CC:   Chief Complaint   Patient presents with    Follow-Up     On lab results from 01/30/25    Cough     Lingering cough since December, post nasal drip        HPI:     Verbal consent was acquired by the patient to use [employer] ambient listening note generation during this visit Yes     History of Present Illness  The patient presents for evaluation of blood pressure management, elevated A1c, elevated LDL, postnasal drip, weight gain, and heartburn.    Blood Pressure Management  She monitors her blood pressure at home, noting that her second and third readings are often worse than the initial. She consumes coffee and observes normal blood pressure during doctor's visits. Today, she had 1 cup of coffee before her appointment.  - Onset: Not specified.  - Character: Second and third readings often worse than the initial.  - Alleviating/Aggravating Factors: Consumes coffee; normal readings during doctor's visits.  - Timing: Monitors at home; had 1 cup of coffee before today's appointment.    Postnasal Drip and Respiratory Symptoms  Post-Christmas, she experienced influenza symptoms, including coughing and postnasal drip. She reports ear tenderness during respiratory illnesses, requiring careful ear cleaning. She does not use antihistamines and reports no constant congestion. Currently, she has mild ear tenderness and continues to cough with mucus drainage. Warm salt water gargles and sinus rinses have been ineffective. She has no sore throat but increased rhinorrhea in winter and spring. Humidifiers and hot showers provide some relief. She was exposed to secondhand smoke until age 19. She received a flu vaccine and antibiotics at urgent care, which did not help.  - Onset: Post-[place].  - Location: Ears, nasal passages.  - Duration: Ongoing since post-Christmas.  - Character: Coughing, postnasal drip, mild ear  tenderness, mucus drainage, increased rhinorrhea in winter and spring.  - Alleviating/Aggravating Factors: Warm salt water gargles, sinus rinses ineffective; humidifiers and hot showers provide some relief.  - Severity: Mild ear tenderness; ongoing symptoms despite treatment.    Weight Gain and Elevated LDL  She has gained weight and is interested in weight loss medications. Advised by the lipid clinic to lower LDL below 70. Currently on Repatha and Nexium, she eats processed foods.  - Onset: Not specified.  - Character: Weight gain, elevated LDL.  - Alleviating/Aggravating Factors: Interested in weight loss medications; advised to lower LDL; currently on Repatha and Nexium; eats processed foods.    Heartburn  Occasional heartburn with spicy foods is managed with Tums. Advised to eat smaller portions and stay upright after meals.  - Onset: Not specified.  - Character: Occasional heartburn with spicy foods.  - Alleviating/Aggravating Factors: Managed with Tums; advised to eat smaller portions and stay upright after meals.    Other Information  She has been on bone medication for 3 months without adverse effects and plans to check bone density after a year.    A benign bump on her leg was removed by a dermatologist but seems to be returning. Follow-up with the dermatologist in July.    SOCIAL HISTORY  - Does not smoke  - Exposed to secondhand smoke until age 19    FAMILY HISTORY  - Mother had COPD    MEDICATIONS  - Current: Repatha  - Current: Nexium    IMMUNIZATIONS  - Flu shot received    Problem   Seasonal Allergic Rhinitis   Postnasal Drip        Current Outpatient Medications   Medication Sig    azelastine (ASTELIN) 137 MCG/SPRAY nasal spray Administer 1 Spray into affected nostril(S) 2 times a day.    asa/apap/caffeine (EXCEDRIN) 250-250-65 MG Tab Take 1 Tablet by mouth every 6 hours as needed for Headache.    NEXLIZET 180-10 MG Tab TAKE ONE TABLET BY MOUTH ONE TIME DAILY    amLODIPine (NORVASC) 5 MG Tab Take 1  "Tablet by mouth every evening.    carvedilol (COREG) 6.25 MG Tab Take 1 Tablet by mouth 2 times a day with meals.    lisinopril (PRINIVIL) 40 MG tablet Take 1 Tablet by mouth every evening.    alendronate (FOSAMAX) 70 MG Tab Take 1 Tablet by mouth every 7 days.    CALCIUM PO Take  by mouth.    Evolocumab (REPATHA SURECLICK) 140 MG/ML Solution Auto-injector SubQ injection pen Inject 1 mL under the skin every 14 days.    estradiol (ESTRACE) 0.1 MG/GM vaginal cream     Vitamin D, Ergocalciferol, 50 MCG (2000 UT) Cap Vitamin D    aspirin EC (ECOTRIN) 81 MG Tablet Delayed Response Take 1 tablet by mouth every day.    promethazine-dextromethorphan (PROMETHAZINE-DM) 6.25-15 MG/5ML syrup Take 5 mL by mouth every four hours as needed for Cough. (Patient not taking: Reported on 2/13/2025)     No Known Allergies    Allergies, past medical history, past surgical history, medications, family history, social history reviewed and updated.    ROS Please see HPI    Physical Exam  Vitals: /70 (BP Location: Right arm, Patient Position: Sitting, BP Cuff Size: Adult)   Pulse 67   Temp 36.9 °C (98.5 °F) (Temporal)   Resp 16   Ht 1.626 m (5' 4\")   Wt 78.7 kg (173 lb 9.6 oz)   SpO2 95%   BMI 29.80 kg/m²   Physical Exam  Constitutional:       Appearance: Normal appearance.   HENT:      Head: Normocephalic and atraumatic.      Right Ear: Tympanic membrane, ear canal and external ear normal.      Left Ear: Tympanic membrane, ear canal and external ear normal.      Nose: Rhinorrhea present.      Mouth/Throat:      Pharynx: Oropharynx is clear. No posterior oropharyngeal erythema.   Cardiovascular:      Rate and Rhythm: Normal rate and regular rhythm.      Pulses: Normal pulses.   Pulmonary:      Effort: Pulmonary effort is normal.      Breath sounds: Normal breath sounds.   Abdominal:      General: Bowel sounds are normal.      Palpations: Abdomen is soft.      Tenderness: There is no abdominal tenderness.   Musculoskeletal:      " Cervical back: Neck supple.      Right lower leg: No edema.      Left lower leg: No edema.   Skin:     General: Skin is warm and dry.   Neurological:      Mental Status: She is alert. Mental status is at baseline.   Psychiatric:         Mood and Affect: Mood normal.         Behavior: Behavior normal.         Thought Content: Thought content normal.         Judgment: Judgment normal.         Labs (1/27/25) were reviewed and discussed with patients.  All questions were answered.      Assessment and Plan    Assessment & Plan      1. Postnasal drip  - azelastine (ASTELIN) 137 MCG/SPRAY nasal spray; Administer 1 Spray into affected nostril(S) 2 times a day.  Dispense: 30 mL; Refill: 3  - Use Flonase or Nasacort as needed.  - Prescribed azelastine nasal spray, 1-2 sprays each nostril once or twice daily.  - Warm salt water gargles and sinus rinses advised.  - Consider Zyrtec if symptoms persist.    2. Seasonal allergic rhinitis, unspecified trigger  - azelastine (ASTELIN) 137 MCG/SPRAY nasal spray; Administer 1 Spray into affected nostril(S) 2 times a day.  Dispense: 30 mL; Refill: 3  - Use Flonase or Nasacort as needed.  - Prescribed azelastine nasal spray, 1-2 sprays each nostril once or twice daily.  - Warm salt water gargles and sinus rinses advised.  - Consider Zyrtec if symptoms persist.    3. Essential hypertension, benign  - CBC WITH DIFFERENTIAL; Future  - TSH WITH REFLEX TO FT4; Future  Continue amlodipine  Monitor BP    4. Impaired fasting glucose  - HEMOGLOBIN A1C; Future  Healthful lifestyle measures          Follow-up:Return in about 5 months (around 7/13/2025), or if symptoms worsen or fail to improve.    This note was created using voice recognition software. There may be unintended errors in spelling, grammar or content.

## 2025-05-20 ENCOUNTER — TELEPHONE (OUTPATIENT)
Dept: CARDIOLOGY | Facility: MEDICAL CENTER | Age: 70
End: 2025-05-20
Payer: MEDICARE

## 2025-06-30 ENCOUNTER — TELEPHONE (OUTPATIENT)
Dept: VASCULAR LAB | Facility: MEDICAL CENTER | Age: 70
End: 2025-06-30
Payer: MEDICARE

## 2025-06-30 NOTE — TELEPHONE ENCOUNTER
Received Renewal request via [facility]  for Nexlizet 180-10mg Tablet. (Quantity:100, Day Supply:100)     Insurance: [facility]  Member ID:  [id number]  BIN: [id number]  PCN: [id number]  Group: [id number]     Ran Test claim via [place] & medication Rejects stating prior authorization is required.    Thank you,  Chela Jane  Pharmacy Liaison  [facility]  [phone]  6/30/2025 7:19 AM         Tranexamic Acid Pregnancy And Lactation Text: It is unknown if this medication is safe during pregnancy or breast feeding.

## 2025-06-30 NOTE — TELEPHONE ENCOUNTER
Prior Authorization for Nexlizet 180-10mg Tablet  (Quantity: 90, Days: 90) has been submitted via Cover My Meds: Key ([id number])    Insurance: [employer] D     Will follow up in 24-48 business hours.     ANEL Parker, PhT  Vascular Pharmacy Liaison (Rx Coordinator)  P: [phone]  6/30/2025 9:43 AM

## 2025-07-09 ENCOUNTER — HOSPITAL ENCOUNTER (OUTPATIENT)
Dept: LAB | Facility: MEDICAL CENTER | Age: 70
End: 2025-07-09
Attending: INTERNAL MEDICINE
Payer: MEDICARE

## 2025-07-09 ENCOUNTER — TELEPHONE (OUTPATIENT)
Dept: VASCULAR LAB | Facility: MEDICAL CENTER | Age: 70
End: 2025-07-09
Payer: MEDICARE

## 2025-07-09 ENCOUNTER — APPOINTMENT (OUTPATIENT)
Dept: LAB | Facility: MEDICAL CENTER | Age: 70
End: 2025-07-09
Payer: MEDICARE

## 2025-07-09 DIAGNOSIS — E78.49 FAMILIAL HYPERLIPIDEMIA: Primary | ICD-10-CM

## 2025-07-09 NOTE — TELEPHONE ENCOUNTER
Received a phone call from pt regarding her labwork. Pt states she was turned away from the lab as her previous orders aren't due to be released until 8/3, when her appt with us is on 7/31. Pt requested a rescheduling of her labs so she can go and get them done today or ASAP for her primary visit on 7/15 as well as our viewing for the Vascular appt.    Spoke in person with Edis Shirley, clinical pharmacist, and he was able to reorder the labs so she can get them done asap.     Called and spoke with Joanie Barrientos [phone] and informed her of the decision, pt verbalized understanding and since they are fasting labs, pt informed me that she will get them done tomorrow.    Thank you,  Chela Jane  Pharmacy Liaison  [facility]  [phone]  7/9/2025 2:24 PM

## 2025-07-09 NOTE — PROGRESS NOTES
Placed orders for repeat lipid panel and CMP for pt to obtain prior to upcoming pcp appt as lab apparently would not let her obtain orders that are currently in the chart.    Edis Shirley, ShanaeD, BCACP

## 2025-07-10 ENCOUNTER — RESULTS FOLLOW-UP (OUTPATIENT)
Dept: MEDICAL GROUP | Facility: IMAGING CENTER | Age: 70
End: 2025-07-10
Payer: MEDICARE

## 2025-07-10 ENCOUNTER — HOSPITAL ENCOUNTER (OUTPATIENT)
Dept: LAB | Facility: MEDICAL CENTER | Age: 70
End: 2025-07-10
Attending: NURSE PRACTITIONER
Payer: MEDICARE

## 2025-07-10 ENCOUNTER — HOSPITAL ENCOUNTER (OUTPATIENT)
Dept: LAB | Facility: MEDICAL CENTER | Age: 70
End: 2025-07-10
Attending: INTERNAL MEDICINE
Payer: MEDICARE

## 2025-07-10 DIAGNOSIS — E78.49 FAMILIAL HYPERLIPIDEMIA: ICD-10-CM

## 2025-07-10 DIAGNOSIS — R73.01 IMPAIRED FASTING GLUCOSE: ICD-10-CM

## 2025-07-10 DIAGNOSIS — I10 ESSENTIAL HYPERTENSION, BENIGN: ICD-10-CM

## 2025-07-10 LAB
ALBUMIN SERPL BCP-MCNC: 4.7 G/DL (ref 3.2–4.9)
ALBUMIN/GLOB SERPL: 2 G/DL
ALP SERPL-CCNC: 55 U/L (ref 30–99)
ALT SERPL-CCNC: 19 U/L (ref 2–50)
ANION GAP SERPL CALC-SCNC: 10 MMOL/L (ref 7–16)
AST SERPL-CCNC: 25 U/L (ref 12–45)
BASOPHILS # BLD AUTO: 0.8 % (ref 0–1.8)
BASOPHILS # BLD: 0.04 K/UL (ref 0–0.12)
BILIRUB SERPL-MCNC: 0.5 MG/DL (ref 0.1–1.5)
BUN SERPL-MCNC: 14 MG/DL (ref 8–22)
CALCIUM ALBUM COR SERPL-MCNC: 8.9 MG/DL (ref 8.5–10.5)
CALCIUM SERPL-MCNC: 9.5 MG/DL (ref 8.5–10.5)
CHLORIDE SERPL-SCNC: 103 MMOL/L (ref 96–112)
CHOLEST SERPL-MCNC: 198 MG/DL (ref 100–199)
CO2 SERPL-SCNC: 25 MMOL/L (ref 20–33)
CREAT SERPL-MCNC: 0.71 MG/DL (ref 0.5–1.4)
EOSINOPHIL # BLD AUTO: 0.07 K/UL (ref 0–0.51)
EOSINOPHIL NFR BLD: 1.4 % (ref 0–6.9)
ERYTHROCYTE [DISTWIDTH] IN BLOOD BY AUTOMATED COUNT: 46 FL (ref 35.9–50)
EST. AVERAGE GLUCOSE BLD GHB EST-MCNC: 114 MG/DL
FASTING STATUS PATIENT QL REPORTED: NORMAL
GFR SERPLBLD CREATININE-BSD FMLA CKD-EPI: 90 ML/MIN/1.73 M 2
GLOBULIN SER CALC-MCNC: 2.3 G/DL (ref 1.9–3.5)
GLUCOSE SERPL-MCNC: 100 MG/DL (ref 65–99)
HBA1C MFR BLD: 5.6 % (ref 4–5.6)
HCT VFR BLD AUTO: 45 % (ref 37–47)
HDLC SERPL-MCNC: 45 MG/DL
HGB BLD-MCNC: 14.2 G/DL (ref 12–16)
IMM GRANULOCYTES # BLD AUTO: 0.02 K/UL (ref 0–0.11)
IMM GRANULOCYTES NFR BLD AUTO: 0.4 % (ref 0–0.9)
LDLC SERPL CALC-MCNC: 132 MG/DL
LYMPHOCYTES # BLD AUTO: 1.44 K/UL (ref 1–4.8)
LYMPHOCYTES NFR BLD: 28.3 % (ref 22–41)
MCH RBC QN AUTO: 28.6 PG (ref 27–33)
MCHC RBC AUTO-ENTMCNC: 31.6 G/DL (ref 32.2–35.5)
MCV RBC AUTO: 90.5 FL (ref 81.4–97.8)
MONOCYTES # BLD AUTO: 0.56 K/UL (ref 0–0.85)
MONOCYTES NFR BLD AUTO: 11 % (ref 0–13.4)
NEUTROPHILS # BLD AUTO: 2.95 K/UL (ref 1.82–7.42)
NEUTROPHILS NFR BLD: 58.1 % (ref 44–72)
NRBC # BLD AUTO: 0 K/UL
NRBC BLD-RTO: 0 /100 WBC (ref 0–0.2)
PLATELET # BLD AUTO: 349 K/UL (ref 164–446)
PMV BLD AUTO: 10.4 FL (ref 9–12.9)
POTASSIUM SERPL-SCNC: 4.7 MMOL/L (ref 3.6–5.5)
PROT SERPL-MCNC: 7 G/DL (ref 6–8.2)
RBC # BLD AUTO: 4.97 M/UL (ref 4.2–5.4)
SODIUM SERPL-SCNC: 138 MMOL/L (ref 135–145)
TRIGL SERPL-MCNC: 104 MG/DL (ref 0–149)
TSH SERPL DL<=0.005 MIU/L-ACNC: 1.87 UIU/ML (ref 0.38–5.33)
WBC # BLD AUTO: 5.1 K/UL (ref 4.8–10.8)

## 2025-07-10 PROCEDURE — 80053 COMPREHEN METABOLIC PANEL: CPT

## 2025-07-10 PROCEDURE — 84443 ASSAY THYROID STIM HORMONE: CPT

## 2025-07-10 PROCEDURE — 80061 LIPID PANEL: CPT

## 2025-07-10 PROCEDURE — 85025 COMPLETE CBC W/AUTO DIFF WBC: CPT

## 2025-07-10 PROCEDURE — 83036 HEMOGLOBIN GLYCOSYLATED A1C: CPT | Mod: GA

## 2025-07-15 ENCOUNTER — OFFICE VISIT (OUTPATIENT)
Dept: MEDICAL GROUP | Facility: IMAGING CENTER | Age: 70
End: 2025-07-15
Payer: MEDICARE

## 2025-07-15 VITALS
HEART RATE: 62 BPM | WEIGHT: 174.38 LBS | SYSTOLIC BLOOD PRESSURE: 118 MMHG | TEMPERATURE: 99.4 F | BODY MASS INDEX: 29.77 KG/M2 | HEIGHT: 64 IN | OXYGEN SATURATION: 95 % | DIASTOLIC BLOOD PRESSURE: 72 MMHG

## 2025-07-15 DIAGNOSIS — M25.562 CHRONIC PAIN OF LEFT KNEE: ICD-10-CM

## 2025-07-15 DIAGNOSIS — G89.29 CHRONIC PAIN OF LEFT KNEE: ICD-10-CM

## 2025-07-15 DIAGNOSIS — T46.6X5A MYALGIA DUE TO STATIN: ICD-10-CM

## 2025-07-15 DIAGNOSIS — E78.49 FAMILIAL HYPERLIPIDEMIA: ICD-10-CM

## 2025-07-15 DIAGNOSIS — R73.01 IMPAIRED FASTING GLUCOSE: ICD-10-CM

## 2025-07-15 DIAGNOSIS — M79.10 MYALGIA DUE TO STATIN: ICD-10-CM

## 2025-07-15 PROCEDURE — 3078F DIAST BP <80 MM HG: CPT | Performed by: INTERNAL MEDICINE

## 2025-07-15 PROCEDURE — 3074F SYST BP LT 130 MM HG: CPT | Performed by: INTERNAL MEDICINE

## 2025-07-15 PROCEDURE — 99214 OFFICE O/P EST MOD 30 MIN: CPT | Performed by: INTERNAL MEDICINE

## 2025-07-15 ASSESSMENT — FIBROSIS 4 INDEX: FIB4 SCORE: 1.18

## 2025-07-15 NOTE — PROGRESS NOTES
Established Patient    Joanie Arenas is a 70 y.o. female who presents today with the following:    CC:   Chief Complaint   Patient presents with    Follow-Up     Lab results  Has been seeing an orthopedist       HPI:     Verbal consent was acquired by the patient to use Sift ambient listening note generation during this visit Yes     History of Present Illness  The patient presents for evaluation of knee pain, cholesterol management, and health maintenance.    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 Meloxicam improved her pain, but she discontinued it a week ago. She is awaiting physical therapy and has an appointment for a gel injection in 09/2025. Previous cortisone injection was ineffective. Foot swelling improved with meloxicam, elevation, and icing. She plans to continue treatment at [facility] and is considering all options before surgery.  - Onset: Diagnosed based on x-rays.  - Location: Knee.  - Character: Bone-on-bone arthritis.  - Alleviating/Aggravating Factors: Meloxicam improved pain; foot swelling improved with meloxicam, elevation, and icing; previous cortisone injection was ineffective.  - Timing: Discontinued meloxicam a week ago; awaiting physical therapy; gel injection appointment in 09/2025.  - Severity: Pain significant enough to consider all options before surgery.    Diet and Baker's Cysts  She started a diet two weeks ago, losing 7-8 pounds, focusing on fiber, vegetables, artichokes, Benefiber, and eliminating carbohydrates. 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 She walks 2 miles daily but has been limited by knee pain recently.  - Onset: Started diet two weeks ago.  - Duration: Limited by knee pain recently.  - Character: Baker's cysts causing cramps.  - Severity: Limited walking due to knee pain.    Cholesterol Management  She is on Repatha and Nexletol for cholesterol management, with no muscle pain, and has a vascular  "appointment in a couple of weeks. Dermatology appointment scheduled for 2025.  - Alleviating/Aggravating Factors: Repatha and Nexletol.  - Timing: Vascular appointment in a couple of weeks; dermatology appointment scheduled for 2025.  - Severity: No muscle pain.    FAMILY HISTORY  - Father  of a major heart attack at age 32        Current Outpatient Medications   Medication Sig    REPATHA SURECLICK 140 MG/ML Solution Auto-injector SubQ injection pen inject 1ml under the skin every 14 days    azelastine (ASTELIN) 137 MCG/SPRAY nasal spray Administer 1 Spray into affected nostril(S) 2 times a day.    asa/apap/caffeine (EXCEDRIN) 250-250-65 MG Tab Take 1 Tablet by mouth every 6 hours as needed for Headache.    promethazine-dextromethorphan (PROMETHAZINE-DM) 6.25-15 MG/5ML syrup Take 5 mL by mouth every four hours as needed for Cough.    NEXLIZET 180-10 MG Tab TAKE ONE TABLET BY MOUTH ONE TIME DAILY    amLODIPine (NORVASC) 5 MG Tab Take 1 Tablet by mouth every evening.    carvedilol (COREG) 6.25 MG Tab Take 1 Tablet by mouth 2 times a day with meals.    lisinopril (PRINIVIL) 40 MG tablet Take 1 Tablet by mouth every evening.    alendronate (FOSAMAX) 70 MG Tab Take 1 Tablet by mouth every 7 days.    CALCIUM PO Take  by mouth.    estradiol (ESTRACE) 0.1 MG/GM vaginal cream     Vitamin D, Ergocalciferol, 50 MCG (2000 UT) Cap Vitamin D    aspirin EC (ECOTRIN) 81 MG Tablet Delayed Response Take 1 tablet by mouth every day.     Allergies[1]    Allergies, past medical history, past surgical history, medications, family history, social history reviewed and updated.    ROS Please see HPI    Physical Exam  Vitals: /72 (BP Location: Left arm, Patient Position: Sitting, BP Cuff Size: Adult)   Pulse 62   Temp 37.4 °C (99.4 °F) (Temporal)   Ht 1.626 m (5' 4\")   Wt 79.1 kg (174 lb 6 oz)   SpO2 95%   BMI 29.93 kg/m²   Physical Exam  Constitutional:       Appearance: Normal appearance.   HENT:      Head: " Normocephalic and atraumatic.      Right Ear: External ear normal.      Left Ear: External ear normal.      Nose: Nose normal.      Mouth/Throat:      Pharynx: Oropharynx is clear.   Cardiovascular:      Rate and Rhythm: Normal rate and regular rhythm.      Pulses: Normal pulses.   Pulmonary:      Effort: Pulmonary effort is normal.      Breath sounds: Normal breath sounds.   Abdominal:      General: Bowel sounds are normal.      Palpations: Abdomen is soft.      Tenderness: There is no abdominal tenderness.   Musculoskeletal:      Cervical back: Neck supple.      Right lower leg: No edema.      Left lower leg: No edema.   Skin:     General: Skin is warm and dry.   Neurological:      Mental Status: She is alert. Mental status is at baseline.   Psychiatric:         Mood and Affect: Mood normal.         Behavior: Behavior normal.         Thought Content: Thought content normal.         Judgment: Judgment normal.         Labs (7/10/25) were reviewed and discussed with patients.    All questions were answered.      Assessment and Plan    Assessment & Plan      1. Impaired fasting glucose  - Comp Metabolic Panel; Future  - HEMOGLOBIN A1C; Future    2. Familial hyperlipidemia  - Lipid Profile; Future  On Repatha and Nexlizet  Follow with cardiology and pharmacotherapy  Healthful lifestyle measures    3. Myalgia due to statin  Off statin, resolved    4. Chronic pain of left knee  Chronic.  - Blood pressure normalized after discontinuing meloxicam.  - Use topical Voltaren and engage in physical therapy.  - Gel injection scheduled for 09/2025 with orthopedics  - Lose weight          Follow-up:Return in about 4 months (around 11/15/2025), or if symptoms worsen or fail to improve.    This note was created using voice recognition software. There may be unintended errors in spelling, grammar or content.           [1] No Known Allergies

## 2025-07-31 ENCOUNTER — TELEPHONE (OUTPATIENT)
Dept: CARDIOLOGY | Facility: MEDICAL CENTER | Age: 70
End: 2025-07-31

## 2025-07-31 ENCOUNTER — OFFICE VISIT (OUTPATIENT)
Dept: VASCULAR LAB | Facility: MEDICAL CENTER | Age: 70
End: 2025-07-31
Attending: INTERNAL MEDICINE
Payer: MEDICARE

## 2025-07-31 DIAGNOSIS — E78.49 FAMILIAL HYPERLIPIDEMIA: Primary | ICD-10-CM

## 2025-07-31 PROCEDURE — 99214 OFFICE O/P EST MOD 30 MIN: CPT

## 2025-07-31 NOTE — TELEPHONE ENCOUNTER
Prior Authorization for REPATHA SURECLICK 140 MG/ML Solution Auto-injector SubQ injection pen  has been approved for a quantity of 6ml , day supply 84    Prior Authorization reference number: [id number]    Insurance: [employer]   Effective dates: 07/31/25 until 12/31/2025  Copay: $$0     Is patient eligible to fill with [facility]? Yes    Next Steps: The Patients copay is less than $5.00. Will contact the patient to determine choice of pharmacy, if applicable.    CK BURGOS, PT fills at [facility]

## 2025-07-31 NOTE — TELEPHONE ENCOUNTER
Received Renewal PA request via [employer]  for   REPATHA SURECLICK 140 MG/ML Solution Auto-injector SubQ injection pen . (Quantity:6ml, Day Supply:84)     Insurance: [employer] [facility]  Member ID:  [id number]  BIN: [id number]  PCN: [id number]  Group: [id number]     Ran Test claim via [place] & medication Rejects stating prior authorization is required.    Prior Authorization for REPATHA SURECLICK 140 MG/ML Solution Auto-injector SubQ injection pen  (Quantity: 6ml, Days: 84) has been submitted via Cover My Meds: Key ([id number])        Will follow up in 24-48 business hours.

## 2025-07-31 NOTE — PROGRESS NOTES
[facility]    Joanie Arenas presents for management of dyslipidemia    Referral Source: Nikhil Meeks MD    Date Referral Placed: 25    Date First Seen in Clinic: 23    PERTINENT HLD PMHX  Age at Initial Diagnosis of Dyslipidemia: late 20s      Baseline Lipids Prior to Treatment:   On Statin alone (on record, but Dr. Meeks states her peak LDL)    Latest Reference Range & Units 18 10:19   Cholesterol,Tot 100 - 199 mg/dL 274 (H)   Triglycerides 0 - 149 mg/dL 133   HDL >=40 mg/dL 52   LDL <100 mg/dL 195 (H)   However, the patient has been on Repatha 140mg Q14d and Ezetimibe 10mg QD since 2021 and those labs are below:     Latest Reference Range & Units 21 08:04   Cholesterol,Tot 100 - 199 mg/dL 238 (H)   Triglycerides 0 - 149 mg/dL 214 (H)   HDL >=40 mg/dL 47   LDL <100 mg/dL 148 (H)   (H): Data is abnormally high    History of ASCVD: None    Previously Attempted Interventions for Lipids - including outcome  Statin: Rosuvastatin, Atorvastatin, Pravastatin; she thinks she has tried them all since she has been on statin for at least 30+ years   Outcome: leg cramps, though leg cramps persisted when taken off statins. Unclear if not a true intolerance, but CK values elevated so she remained off of statin   Non-Statin: none  Outcome: not applicable    Secondary causes/contributors (report to medical director if present):   Endocrine/Hypothyroidism:  none reported   Liver disease: none reported   Renal disease/nephrotic syndrome:  none reported  Dietary-induced (ketogenic, lean mass hyper-responder)? no  Medications: Beta-blockers    CURRENT MED MGMT  Current Lipid Lowering Meds:   Statin: None  Non-Statin: Repatha 140 mg q14 days, Nexlizet 180/10 mg daily  Supplements: Vitamin D 2000 units daily  Current adverse drug reactions/side effects? no  Is Adherence an Issue? no    Any Family History Cardiovascular Disease? yes  Relationship and Age of Onset: Father  of MI at 32  "yo.  Patient also reports that both her daughter and grandson has FH and are treated with statins.      There were no vitals filed for this visit.   BMI Readings from Last 1 Encounters:   07/15/25 29.93 kg/m²      Wt Readings from Last 3 Encounters:   07/15/25 79.1 kg (174 lb 6 oz)   02/13/25 78.7 kg (173 lb 9.6 oz)   01/03/25 78.5 kg (173 lb)     BP Readings from Last 2 Encounters:   07/15/25 118/72   02/13/25 134/70       DATA REVIEW:  Most Recent Lipid Panel:   Lab Results   Component Value Date/Time    CHOLSTRLTOT 198 07/10/2025 08:44 AM    LDLCALC 80 01/27/2025 06:35 AM     (H) 07/10/2025 08:44 AM    HDL 45 07/10/2025 08:44 AM    TRIGLYCERIDE 104 07/10/2025 08:44 AM     Lab Results   Component Value Date/Time     (H) 07/10/2025 08:44 AM    LDL 75 07/12/2024 08:08 AM    LDL 93 03/14/2024 08:27 AM      Lab Results   Component Value Date/Time    LDLCALC 80 01/27/2025 06:35 AM      Lab Results   Component Value Date/Time    LIPOPROTA 9 01/04/2023 08:02 AM      No results found for: \"APOB\"   Lab Results   Component Value Date/Time    CRPHIGHSEN 0.6 04/25/2016 08:51 AM       Other Pertinent Blood Work:   Lab Results   Component Value Date    SODIUM 138 07/10/2025    POTASSIUM 4.7 07/10/2025    CHLORIDE 103 07/10/2025    CO2 25 07/10/2025    ANION 10.0 07/10/2025    GLUCOSE 100 (H) 07/10/2025    BUN 14 07/10/2025    CREATININE 0.71 07/10/2025    CALCIUM 9.5 07/10/2025    ASTSGOT 25 07/10/2025    ALTSGPT 19 07/10/2025    ALKPHOSPHAT 55 07/10/2025    TBILIRUBIN 0.5 07/10/2025    ALBUMIN 4.7 07/10/2025    AGRATIO 2.0 07/10/2025    TSHULTRASEN 1.870 07/10/2025    CPKTOTAL 150 12/08/2023       VASCULAR IMAGING:  CAC Score (if available):    12/06/2022  Coronary calcification:  LMA - 26.3  LCX - 17.9  LAD - 96.0  RCA - 82.4  PDA - 0.0  Total Calcium Score: 222.7  CIMT/Vascular screen (if available): N/A  Other (if applicable): N/A    ASSESSMENT AND PLAN    Patient Type, check all that apply: Primary " "Prevention    Major ASCVD events: None    Evidence of genetic dyslipidemia (Familial Hyperlipidemia): Yes     ASCVD risk calculations (if applicable)  The 10-year ASCVD risk score (Edilma WALKER, et al., 2019) is: 13.6%   7.5 - <20% \"intermediate risk\"     ACC/AHA Indication for Statin Therapy:  LDL-C at baseline >190 mg/dl: Indication for High intensity statin     Other Significant Risk Markers:  High-risk conditions: >64yr old , Hypertension , and Persistent LDL-C >100 despite max-tolerated statin + ezetimibe   Risk-enhancers: Famhx of premature ASCVD  Lipoprotein(a): Favorable (<30 mg/dl or <75 nmol/L)  Most recent CAC percentile: Calcium score is above the 75th percentile for the patient's age and sex.     Lipid Goals:  Primary: LDL-C < 100 or could be argued to be < 70 given high risk conditions and risk enhancers  Secondary apoB <90 mg/dl or could be argued to be < 70  At goals? no    LIFESTYLE INTERVENTIONS  TOBACCO:   Continued complete avoidance of all tobacco products   EtOH:   Men: No more than two standard servings per day  Women: No more than one standard serving per day  PHYSICAL ACTIVITY: at least 150 min per week of moderate intensity  NUTRITION: Mediterranean, Plant-based - increase nuts, beans, whole grains, substitute plant protein for animal 1-2 times/week, and DASH    Of note, patient states that she has not been eating well the past months; admits to more red meats and fast food prior to recent labs. She has made recent dietary modifications and is limiting her carb intake and trying to increase protein/veggies/fiber. Provided education on nutrition recommendations as listed above and provided handouts.    LIPID-LOWERING MEDICATION MANAGEMENT:     Statin Therapy:   Would not rechallenge at this time due to prior intolerance and due to pt preference    Non-Statin Meds:   EZETIMIBE: see below  NEXLETOL/NEXLIZET (avoid simva >20, prava >40): Continue Nexlizet 180-10 mg once daily  BAS: Not currently " indicated    OMEGA-3 FAs: Not currently indicated  FIBRATE:  Not currently indicated  PCSK9 mAb: Continue Repatha 140 mg subQ Q14D  Indication: ASCVD with suboptimal control of LDL-C despite maximally tolerated statin  LEQVIO: Consider in the future    Recommended Supplements: Continue Vitamin D 2000 units daily     Pt presents today for lipid f/u  TG has improved but LDL has increased despite compliance to medications. This is likely due to non-adherence to previous lifestyle modifications however pt is working on getting back on track and we discussed it further today.  She would like to continue working on her diet at this time.  Recommended sooner f/u to evaluate effect of dietary modifications however pt's soonest availability is ~2.5 months from now.  If LDL remains elevated, can consider switching Repatha to Leqvio to see if pt would response better to this.    Studies Ordered at Todays Visit: None   Blood Work To Be Obtained Prior to Next Visit: Lipid panel, CMP, and ApoB  Follow-Up: 2.5 months per pt    [place] Frances PharmD     CC:  Tsung-Hsien Justin Lin, M.D. Radulescu, Vlad, M.D. Michael Bloch, MD